# Patient Record
Sex: MALE | Race: WHITE | NOT HISPANIC OR LATINO | Employment: UNEMPLOYED | URBAN - NONMETROPOLITAN AREA
[De-identification: names, ages, dates, MRNs, and addresses within clinical notes are randomized per-mention and may not be internally consistent; named-entity substitution may affect disease eponyms.]

---

## 2019-03-14 ENCOUNTER — HOSPITAL ENCOUNTER (EMERGENCY)
Facility: HOSPITAL | Age: 59
Discharge: HOME | End: 2019-03-14
Attending: EMERGENCY MEDICINE | Admitting: EMERGENCY MEDICINE

## 2019-03-14 VITALS
WEIGHT: 216 LBS | RESPIRATION RATE: 20 BRPM | HEIGHT: 67 IN | DIASTOLIC BLOOD PRESSURE: 92 MMHG | HEART RATE: 102 BPM | BODY MASS INDEX: 33.9 KG/M2 | SYSTOLIC BLOOD PRESSURE: 142 MMHG | OXYGEN SATURATION: 96 % | TEMPERATURE: 98.8 F

## 2019-03-14 DIAGNOSIS — F41.9 ANXIETY: Primary | ICD-10-CM

## 2019-03-14 PROCEDURE — 99283 EMERGENCY DEPT VISIT LOW MDM: CPT

## 2019-03-14 RX ORDER — LORAZEPAM 1 MG/1
1 TABLET ORAL ONCE
Status: COMPLETED | OUTPATIENT
Start: 2019-03-14 | End: 2019-03-14

## 2019-03-14 RX ADMIN — LORAZEPAM 1 MG: 1 TABLET ORAL at 14:05

## 2019-03-14 NOTE — ED NOTES
contacted and asked to apeak with patient about other alternatives for living arrangements. ([place])     Kalpana Adams, RN  03/14/19 [id number]

## 2019-03-14 NOTE — ED PROVIDER NOTES
Subjective   Pt, hx of chronic depression and anxiety, reports he was kicked out of his brother's house 2 days ago and has been at the homeless shelter since then. Pt reports homeless shelter advised pt to come to ED for anxiety. Pt denies being suicidal or homicidal, currently not on any psych medications.         History provided by:  Patient   used: No    Anxiety   Symptoms include nervous/anxious behavior. Patient reports no shortness of breath or suicidal ideas.           Review of Systems   Constitutional: Negative for fatigue.   HENT: Negative for congestion.    Respiratory: Negative for cough and shortness of breath.    Gastrointestinal: Negative for vomiting.   Endocrine: Negative for polyuria.   Skin: Negative for color change.   Neurological: Negative for syncope.   Psychiatric/Behavioral: Negative for self-injury and suicidal ideas. The patient is nervous/anxious. The patient is not hyperactive.    All other systems reviewed and are negative.      History reviewed. No pertinent past medical history.    No Known Allergies    History reviewed. No pertinent surgical history.    History reviewed. No pertinent family history.    Social History     Socioeconomic History   • Marital status: Single     Spouse name: Not on file   • Number of children: Not on file   • Years of education: Not on file   • Highest education level: Not on file   Tobacco Use   • Smoking status: Current Every Day Smoker     Packs/day: 0.50     Types: Cigarettes   Substance and Sexual Activity   • Alcohol use: No     Frequency: Never   • Drug use: No           Objective   Physical Exam   Constitutional: He is oriented to person, place, and time. He appears well-developed and well-nourished.   HENT:   Head: Normocephalic.   Right Ear: Hearing normal.   Left Ear: Hearing normal.   Nose: Nose normal.   Eyes: Conjunctivae, EOM and lids are normal.   Neck: Trachea normal and full passive range of motion without pain.    Cardiovascular: Regular rhythm, S1 normal, S2 normal, normal heart sounds and normal pulses.   Pulmonary/Chest: Effort normal and breath sounds normal.   Abdominal: Normal appearance and bowel sounds are normal.   Neurological: He is alert and oriented to person, place, and time. He is not disoriented.   Skin: Skin is warm and dry. He is not diaphoretic.   Psychiatric: His speech is normal and behavior is normal. Thought content normal. His mood appears anxious. He does not express impulsivity. He expresses no suicidal ideation.   Nursing note and vitals reviewed.      Procedures         Labs Reviewed - No data to display    No orders to display           ED Course      2:30P  Lengthy discussion with pt about results. Pt expresses that he does not where to go tomorrow during the day because homeless shelter forces people to leave during the day. Pt expresses LLE arthritis but refuses x-ray or additional imaging for leg. JAKE Acuna called  and will provide resources for patient. Will provide information on [facility] follow-up. Pt is still denying suicidal or homcidal            MDM      Final diagnoses:   Anxiety            Rosario Olivera PA-C  03/15/19 [id number]

## 2019-03-14 NOTE — ED NOTES
"Patient states he is \"feeling depressed\" about having to live in a shelter after being \"kicked out\" of his brother's home.  Patient denies suicidal or homicidal thoughts.     Kalpana Adams RN  03/14/19 [id number]    "

## 2019-12-26 ENCOUNTER — APPOINTMENT (OUTPATIENT)
Dept: CT IMAGING | Facility: HOSPITAL | Age: 59
End: 2019-12-26

## 2019-12-26 ENCOUNTER — HOSPITAL ENCOUNTER (EMERGENCY)
Facility: HOSPITAL | Age: 59
Discharge: HOME | End: 2019-12-26
Attending: FAMILY MEDICINE | Admitting: FAMILY MEDICINE

## 2019-12-26 VITALS
HEART RATE: 76 BPM | DIASTOLIC BLOOD PRESSURE: 81 MMHG | WEIGHT: 189.5 LBS | TEMPERATURE: 98.3 F | OXYGEN SATURATION: 99 % | RESPIRATION RATE: 18 BRPM | SYSTOLIC BLOOD PRESSURE: 128 MMHG | HEIGHT: 67 IN | BODY MASS INDEX: 29.74 KG/M2

## 2019-12-26 DIAGNOSIS — R10.31 RIGHT INGUINAL PAIN: Primary | ICD-10-CM

## 2019-12-26 LAB
ANION GAP SERPL CALCULATED.3IONS-SCNC: 9 MMOL/L (ref 5–15)
BUN BLD-MCNC: 18 MG/DL (ref 6–20)
BUN/CREAT SERPL: 20.9 (ref 7–25)
CALCIUM SPEC-SCNC: 9 MG/DL (ref 8.6–10.5)
CHLORIDE SERPL-SCNC: 106 MMOL/L (ref 98–107)
CO2 SERPL-SCNC: 26 MMOL/L (ref 22–29)
CREAT BLD-MCNC: 0.86 MG/DL (ref 0.76–1.27)
GFR SERPL CREATININE-BSD FRML MDRD: 91 ML/MIN/1.73
GLUCOSE BLD-MCNC: 99 MG/DL (ref 65–99)
HOLD SPECIMEN: NORMAL
POTASSIUM BLD-SCNC: 4.2 MMOL/L (ref 3.5–5.2)
SODIUM BLD-SCNC: 141 MMOL/L (ref 136–145)
WHOLE BLOOD HOLD SPECIMEN: NORMAL

## 2019-12-26 PROCEDURE — 90471 IMMUNIZATION ADMIN: CPT | Performed by: FAMILY MEDICINE

## 2019-12-26 PROCEDURE — 25010000002 TDAP 5-2.5-18.5 LF-MCG/0.5 SUSPENSION: Performed by: FAMILY MEDICINE

## 2019-12-26 PROCEDURE — 80048 BASIC METABOLIC PNL TOTAL CA: CPT | Performed by: FAMILY MEDICINE

## 2019-12-26 PROCEDURE — 25010000002 IOPAMIDOL 61 % SOLUTION: Performed by: FAMILY MEDICINE

## 2019-12-26 PROCEDURE — 99284 EMERGENCY DEPT VISIT MOD MDM: CPT

## 2019-12-26 PROCEDURE — 72193 CT PELVIS W/DYE: CPT

## 2019-12-26 PROCEDURE — 90715 TDAP VACCINE 7 YRS/> IM: CPT | Performed by: FAMILY MEDICINE

## 2019-12-26 RX ORDER — MELOXICAM 15 MG/1
15 TABLET ORAL DAILY
Qty: 30 TABLET | Refills: 0 | Status: SHIPPED | OUTPATIENT
Start: 2019-12-26 | End: 2019-12-30 | Stop reason: SDUPTHER

## 2019-12-26 RX ORDER — ONDANSETRON 4 MG/1
4 TABLET, ORALLY DISINTEGRATING ORAL EVERY 6 HOURS PRN
Qty: 10 TABLET | Refills: 0 | Status: SHIPPED | OUTPATIENT
Start: 2019-12-26 | End: 2020-09-29

## 2019-12-26 RX ORDER — IBUPROFEN 600 MG/1
600 TABLET ORAL EVERY 6 HOURS PRN
COMMUNITY
End: 2019-12-26

## 2019-12-26 RX ORDER — HYDROCODONE BITARTRATE AND ACETAMINOPHEN 7.5; 325 MG/1; MG/1
1 TABLET ORAL ONCE
Status: COMPLETED | OUTPATIENT
Start: 2019-12-26 | End: 2019-12-26

## 2019-12-26 RX ADMIN — HYDROCODONE BITARTRATE AND ACETAMINOPHEN 1 TABLET: 7.5; 325 TABLET ORAL at 16:18

## 2019-12-26 RX ADMIN — TETANUS TOXOID, REDUCED DIPHTHERIA TOXOID AND ACELLULAR PERTUSSIS VACCINE, ADSORBED 0.5 ML: 5; 2.5; 8; 8; 2.5 SUSPENSION INTRAMUSCULAR at 18:48

## 2019-12-26 RX ADMIN — IOPAMIDOL 90 ML: 612 INJECTION, SOLUTION INTRAVENOUS at 18:04

## 2019-12-30 ENCOUNTER — OFFICE VISIT (OUTPATIENT)
Dept: FAMILY MEDICINE CLINIC | Facility: CLINIC | Age: 59
End: 2019-12-30

## 2019-12-30 VITALS
HEIGHT: 67 IN | BODY MASS INDEX: 29.34 KG/M2 | SYSTOLIC BLOOD PRESSURE: 142 MMHG | TEMPERATURE: 97.6 F | DIASTOLIC BLOOD PRESSURE: 98 MMHG | OXYGEN SATURATION: 98 % | HEART RATE: 92 BPM | WEIGHT: 186.9 LBS

## 2019-12-30 DIAGNOSIS — Z09 HOSPITAL DISCHARGE FOLLOW-UP: Primary | ICD-10-CM

## 2019-12-30 DIAGNOSIS — K40.90 INGUINAL HERNIA OF RIGHT SIDE WITHOUT OBSTRUCTION OR GANGRENE: ICD-10-CM

## 2019-12-30 PROCEDURE — 99203 OFFICE O/P NEW LOW 30 MIN: CPT | Performed by: STUDENT IN AN ORGANIZED HEALTH CARE EDUCATION/TRAINING PROGRAM

## 2019-12-30 RX ORDER — IBUPROFEN 200 MG
200 TABLET ORAL DAILY
COMMUNITY
End: 2019-12-30 | Stop reason: ALTCHOICE

## 2019-12-30 RX ORDER — MELOXICAM 15 MG/1
15 TABLET ORAL DAILY
Qty: 30 TABLET | Refills: 1 | Status: SHIPPED | OUTPATIENT
Start: 2019-12-30 | End: 2020-01-27 | Stop reason: SDUPTHER

## 2019-12-30 NOTE — PROGRESS NOTES
Subjective:     Germain Masterson is a 59 y.o. male who presents for ***        Past Medical Hx:  No past medical history on file.    Past Surgical Hx:  No past surgical history on file.    Health Maintenance:  Health Maintenance   Topic Date Due   • ANNUAL PHYSICAL  11/28/1963   • PNEUMOCOCCAL VACCINE (19-64 MEDIUM RISK) (1 of 1 - PPSV23) 11/28/1979   • ZOSTER VACCINE (1 of 2) 11/28/2010   • INFLUENZA VACCINE  08/01/2019   • HEPATITIS C SCREENING  12/30/2019   • COLONOSCOPY  12/30/2019   • TDAP/TD VACCINES (2 - Td) 12/26/2029       Current Meds:    Current Outpatient Medications:   •  meloxicam (MOBIC) 15 MG tablet, Take 1 tablet by mouth Daily., Disp: 30 tablet, Rfl: 0  •  ondansetron ODT (ZOFRAN-ODT) 4 MG disintegrating tablet, Take 1 tablet by mouth Every 6 (Six) Hours As Needed for Nausea or Vomiting., Disp: 10 tablet, Rfl: 0    Allergies:  Patient has no known allergies.    Family Hx:  No family history on file.     Social History:  Social History     Socioeconomic History   • Marital status: Single     Spouse name: Not on file   • Number of children: Not on file   • Years of education: Not on file   • Highest education level: Not on file   Tobacco Use   • Smoking status: Current Every Day Smoker     Packs/day: 0.50     Types: Cigars   Substance and Sexual Activity   • Alcohol use: No     Frequency: Never   • Drug use: No       Review of Systems    Review of Systems    Objective:     There were no vitals taken for this visit.    Physical Exam    Assessment/Plan:     No diagnosis found.      There are no diagnoses linked to this encounter.    Follow-up:     No follow-ups on file.      Goals    None         Preventative:  Does not smoke. ***  Minimal alcohol consumption. ***  {Preventive male/female:73580}  On osteoporosis therapy?{yes/no/not indicated:58291}    Patient's There is no height or weight on file to calculate BMI. BMI is {BMI range:43241}.      PHQ-9 Depression Screening  Little interest or  pleasure in doing things?     Feeling down, depressed, or hopeless?     Trouble falling or staying asleep, or sleeping too much?     Feeling tired or having little energy?     Poor appetite or overeating?     Feeling bad about yourself - or that you are a failure or have let yourself or your family down?     Trouble concentrating on things, such as reading the newspaper or watching television?     Moving or speaking so slowly that other people could have noticed? Or the opposite - being so fidgety or restless that you have been moving around a lot more than usual?     Thoughts that you would be better off dead, or of hurting yourself in some way?     PHQ-9 Total Score     If you checked off any problems, how difficult have these problems made it for you to do your work, take care of things at home, or get along with other people?           Tetanus vaccine: {Immunization status:33414}  Annual influenza vaccine: {Immunization status:25500}   Pneumococcal vaccine: {Immunization status:45773}  HPV vaccine: {Immunization status:97729}  Hep B vaccine: {Immunization status:24846}   Shingrix vaccine: {Immunization status:98498}      RISK SCORE: {Resident-RiskScore:83961}      Lindy Mora M.D. PGY3  [facility]  [address] [place] [address]  Office: [phone]      This document has been electronically signed by Lindy Mora MD on December 30, 2019 3:01 PM

## 2020-01-09 ENCOUNTER — CONSULT (OUTPATIENT)
Dept: SURGERY | Facility: CLINIC | Age: 60
End: 2020-01-09

## 2020-01-09 VITALS
HEIGHT: 67 IN | HEART RATE: 100 BPM | BODY MASS INDEX: 29.07 KG/M2 | DIASTOLIC BLOOD PRESSURE: 82 MMHG | WEIGHT: 185.2 LBS | TEMPERATURE: 97.1 F | SYSTOLIC BLOOD PRESSURE: 144 MMHG

## 2020-01-09 DIAGNOSIS — R10.31 RIGHT GROIN PAIN: Primary | ICD-10-CM

## 2020-01-09 PROCEDURE — 99202 OFFICE O/P NEW SF 15 MIN: CPT | Performed by: SURGERY

## 2020-01-27 DIAGNOSIS — K40.90 INGUINAL HERNIA OF RIGHT SIDE WITHOUT OBSTRUCTION OR GANGRENE: ICD-10-CM

## 2020-01-27 NOTE — PROGRESS NOTES
CHIEF COMPLAINT:    Possible right inguinal hernia    HISTORY OF PRESENT ILLNESS:    Germain Masetrson is a 59 y.o. male who was previously seen by the emergency department in December for right inguinal pain.  At that time he underwent a CT that showed a possible fat-containing right inguinal hernia.  He states that after he has been on his feet moving around for significant period of time he sometimes has right groin pain and feels as though there may be a bulge in the area.  Recently he has not had any pain and currently has no pain in the office.    Past Medical History:   Diagnosis Date   • Hypertension        Past Surgical History:   Procedure Laterality Date   • CYSTECTOMY      back       Prior to Admission medications    Medication Sig Start Date End Date Taking? Authorizing Provider   Acetaminophen (TYLENOL ARTHRITIS PAIN PO) Take 1 tablet by mouth Daily.   Yes Provider, MD Rosmearie   meloxicam (MOBIC) 15 MG tablet Take 1 tablet by mouth Daily. 12/30/19  Yes Lindy Mora MD   ondansetron ODT (ZOFRAN-ODT) 4 MG disintegrating tablet Take 1 tablet by mouth Every 6 (Six) Hours As Needed for Nausea or Vomiting. 12/26/19   Wai Brown MD       No Known Allergies    Family History   Problem Relation Age of Onset   • Hypertension Mother    • Cancer Mother    • Cancer Father        Social History     Socioeconomic History   • Marital status: Single     Spouse name: Not on file   • Number of children: Not on file   • Years of education: Not on file   • Highest education level: Not on file   Tobacco Use   • Smoking status: Current Every Day Smoker     Packs/day: 0.50     Types: Cigars   • Smokeless tobacco: Never Used   Substance and Sexual Activity   • Alcohol use: No     Frequency: Never   • Drug use: No   • Sexual activity: Yes     Partners: Female     Birth control/protection: None       Review of Systems   Constitutional: Negative for activity change, appetite change, chills and  "fever.   HENT: Negative for hearing loss, nosebleeds and trouble swallowing.    Cardiovascular: Negative for chest pain, palpitations and leg swelling.   Gastrointestinal: Negative for abdominal distention, abdominal pain, anal bleeding, blood in stool, constipation, diarrhea, nausea, rectal pain and vomiting.   Endocrine: Negative for cold intolerance, heat intolerance, polydipsia and polyuria.   Genitourinary: Negative for decreased urine volume, difficulty urinating, dysuria, enuresis, frequency, hematuria and urgency.        Right groin pain, resolved.   Musculoskeletal: Positive for arthralgias. Negative for back pain, gait problem, myalgias and neck pain.   Skin: Negative for pallor, rash and wound.   Allergic/Immunologic: Negative for immunocompromised state.   Neurological: Negative for dizziness, seizures, weakness, light-headedness, numbness and headaches.   Psychiatric/Behavioral: Negative for agitation and behavioral problems. The patient is not nervous/anxious.        Objective     /82   Pulse 100   Temp 97.1 °F (36.2 °C) (Temporal)   Ht 170.2 cm (67\")   Wt 84 kg (185 lb 3.2 oz)   BMI 29.01 kg/m²     Physical Exam   Constitutional: He is oriented to person, place, and time. He appears well-developed and well-nourished.   HENT:   Head: Normocephalic and atraumatic.   Nose: Nose normal.   Eyes: Conjunctivae and EOM are normal. Right eye exhibits no discharge. Left eye exhibits no discharge.   Neck: Trachea normal, normal range of motion and phonation normal. Neck supple. No JVD present. No tracheal deviation and no edema present. No thyromegaly present.   Cardiovascular: Normal rate, regular rhythm and normal heart sounds. Exam reveals no gallop and no friction rub.   No murmur heard.  Pulmonary/Chest: Effort normal and breath sounds normal. No accessory muscle usage. No respiratory distress. He has no decreased breath sounds. He has no wheezes. He has no rales. He exhibits no tenderness. "   Abdominal: Soft. He exhibits no distension, no fluid wave, no ascites, no pulsatile midline mass and no mass. There is no tenderness. There is no rebound and no guarding. No hernia.   Musculoskeletal: Normal range of motion. He exhibits no edema, tenderness or deformity.   Lymphadenopathy:     He has no cervical adenopathy.        Left: No supraclavicular adenopathy present.   Neurological: He is alert and oriented to person, place, and time. He has normal strength. No cranial nerve deficit.   Skin: Skin is warm and dry. No rash noted. He is not diaphoretic. No erythema. No pallor.   Psychiatric: He has a normal mood and affect. His speech is normal and behavior is normal. Judgment and thought content normal. Cognition and memory are normal.   Vitals reviewed.      DIAGNOSTIC DATA:    CT reviewed showing possible fat-containing inguinal hernia    ASSESSMENT:    Right groin pain, no demonstrable hernia on exam    PLAN:    Currently I cannot demonstrate any hernia in the office today.  The patient also is asymptomatic currently.  If he has return of symptoms or a notable bulge in the groin I would be happy to see him back for further assessment.          This document has been electronically signed by Durga Hunter MD on January 27, 2020 4:46 PM

## 2020-01-27 NOTE — TELEPHONE ENCOUNTER
PATIENT'S EMERGENCY CONTACT CALLED; PATIENT IS NEEDING MED REFILL ON HIS meloxicam (MOBIC) 15 MG tablet AND WOULD LIKE FOR THIS TO BE SENT TO [facility]. HER NUMBER TO CALL BACK [phone]. HE IS REALLY NEEDING THIS BECAUSE HE IS OUT.    THANK YOU,      NANCY

## 2020-01-28 RX ORDER — MELOXICAM 15 MG/1
15 TABLET ORAL DAILY
Qty: 30 TABLET | Refills: 0 | Status: SHIPPED | OUTPATIENT
Start: 2020-01-28 | End: 2020-04-14 | Stop reason: SDUPTHER

## 2020-04-14 DIAGNOSIS — K40.90 INGUINAL HERNIA OF RIGHT SIDE WITHOUT OBSTRUCTION OR GANGRENE: ICD-10-CM

## 2020-04-14 RX ORDER — MELOXICAM 15 MG/1
15 TABLET ORAL DAILY
Qty: 30 TABLET | Refills: 0 | Status: SHIPPED | OUTPATIENT
Start: 2020-04-14 | End: 2020-05-21 | Stop reason: SDUPTHER

## 2020-05-21 DIAGNOSIS — K40.90 INGUINAL HERNIA OF RIGHT SIDE WITHOUT OBSTRUCTION OR GANGRENE: ICD-10-CM

## 2020-05-22 RX ORDER — MELOXICAM 15 MG/1
15 TABLET ORAL DAILY
Qty: 30 TABLET | Refills: 0 | Status: SHIPPED | OUTPATIENT
Start: 2020-05-22 | End: 2020-06-19 | Stop reason: SDUPTHER

## 2020-06-19 DIAGNOSIS — K40.90 INGUINAL HERNIA OF RIGHT SIDE WITHOUT OBSTRUCTION OR GANGRENE: ICD-10-CM

## 2020-06-19 RX ORDER — MELOXICAM 15 MG/1
15 TABLET ORAL DAILY
Qty: 30 TABLET | Refills: 0 | Status: SHIPPED | OUTPATIENT
Start: 2020-06-19 | End: 2020-07-21

## 2020-07-19 DIAGNOSIS — K40.90 INGUINAL HERNIA OF RIGHT SIDE WITHOUT OBSTRUCTION OR GANGRENE: ICD-10-CM

## 2020-07-21 RX ORDER — MELOXICAM 15 MG/1
15 TABLET ORAL DAILY
Qty: 30 TABLET | Refills: 0 | Status: SHIPPED | OUTPATIENT
Start: 2020-07-21 | End: 2020-10-02 | Stop reason: HOSPADM

## 2020-09-14 DIAGNOSIS — K40.90 INGUINAL HERNIA OF RIGHT SIDE WITHOUT OBSTRUCTION OR GANGRENE: ICD-10-CM

## 2020-09-17 RX ORDER — MELOXICAM 15 MG/1
15 TABLET ORAL DAILY
Qty: 30 TABLET | Refills: 0 | OUTPATIENT
Start: 2020-09-17

## 2020-09-17 NOTE — TELEPHONE ENCOUNTER
Please have patient come in for evaluation.  If he is still experiencing pain after this long, he needs to be examined.

## 2020-09-29 ENCOUNTER — APPOINTMENT (OUTPATIENT)
Dept: CT IMAGING | Facility: HOSPITAL | Age: 60
End: 2020-09-29

## 2020-09-29 ENCOUNTER — APPOINTMENT (OUTPATIENT)
Dept: GENERAL RADIOLOGY | Facility: HOSPITAL | Age: 60
End: 2020-09-29

## 2020-09-29 ENCOUNTER — HOSPITAL ENCOUNTER (OUTPATIENT)
Facility: HOSPITAL | Age: 60
Discharge: HOME | End: 2020-10-02
Attending: EMERGENCY MEDICINE | Admitting: FAMILY MEDICINE

## 2020-09-29 DIAGNOSIS — R10.10 UPPER ABDOMINAL PAIN: Primary | ICD-10-CM

## 2020-09-29 DIAGNOSIS — R93.5 ABNORMAL ABDOMINAL CT SCAN: ICD-10-CM

## 2020-09-29 LAB
ALBUMIN SERPL-MCNC: 3.8 G/DL (ref 3.5–5.2)
ALBUMIN/GLOB SERPL: 1.2 G/DL
ALP SERPL-CCNC: 84 U/L (ref 39–117)
ALT SERPL W P-5'-P-CCNC: 12 U/L (ref 1–41)
AMPHET+METHAMPHET UR QL: NEGATIVE
AMPHETAMINES UR QL: NEGATIVE
ANION GAP SERPL CALCULATED.3IONS-SCNC: 7 MMOL/L (ref 5–15)
AST SERPL-CCNC: 11 U/L (ref 1–40)
BARBITURATES UR QL SCN: NEGATIVE
BASOPHILS # BLD AUTO: 0.04 10*3/MM3 (ref 0–0.2)
BASOPHILS NFR BLD AUTO: 0.4 % (ref 0–1.5)
BENZODIAZ UR QL SCN: NEGATIVE
BILIRUB SERPL-MCNC: <0.2 MG/DL (ref 0–1.2)
BILIRUB UR QL STRIP: NEGATIVE
BUN SERPL-MCNC: 21 MG/DL (ref 6–20)
BUN/CREAT SERPL: 24.7 (ref 7–25)
BUPRENORPHINE SERPL-MCNC: NEGATIVE NG/ML
CALCIUM SPEC-SCNC: 9.8 MG/DL (ref 8.6–10.5)
CANNABINOIDS SERPL QL: NEGATIVE
CHLORIDE SERPL-SCNC: 102 MMOL/L (ref 98–107)
CLARITY UR: CLEAR
CO2 SERPL-SCNC: 28 MMOL/L (ref 22–29)
COCAINE UR QL: NEGATIVE
COLOR UR: YELLOW
CREAT SERPL-MCNC: 0.85 MG/DL (ref 0.76–1.27)
DEPRECATED RDW RBC AUTO: 47 FL (ref 37–54)
EOSINOPHIL # BLD AUTO: 0.28 10*3/MM3 (ref 0–0.4)
EOSINOPHIL NFR BLD AUTO: 2.5 % (ref 0.3–6.2)
ERYTHROCYTE [DISTWIDTH] IN BLOOD BY AUTOMATED COUNT: 13.5 % (ref 12.3–15.4)
GFR SERPL CREATININE-BSD FRML MDRD: 92 ML/MIN/1.73
GLOBULIN UR ELPH-MCNC: 3.3 GM/DL
GLUCOSE SERPL-MCNC: 130 MG/DL (ref 65–99)
GLUCOSE UR STRIP-MCNC: NEGATIVE MG/DL
HCT VFR BLD AUTO: 36.2 % (ref 37.5–51)
HGB BLD-MCNC: 11.6 G/DL (ref 13–17.7)
HGB UR QL STRIP.AUTO: NEGATIVE
HOLD SPECIMEN: NORMAL
HOLD SPECIMEN: NORMAL
IMM GRANULOCYTES # BLD AUTO: 0.05 10*3/MM3 (ref 0–0.05)
IMM GRANULOCYTES NFR BLD AUTO: 0.4 % (ref 0–0.5)
KETONES UR QL STRIP: NEGATIVE
LEUKOCYTE ESTERASE UR QL STRIP.AUTO: NEGATIVE
LIPASE SERPL-CCNC: 25 U/L (ref 13–60)
LYMPHOCYTES # BLD AUTO: 1.85 10*3/MM3 (ref 0.7–3.1)
LYMPHOCYTES NFR BLD AUTO: 16.3 % (ref 19.6–45.3)
MCH RBC QN AUTO: 30.3 PG (ref 26.6–33)
MCHC RBC AUTO-ENTMCNC: 32 G/DL (ref 31.5–35.7)
MCV RBC AUTO: 94.5 FL (ref 79–97)
METHADONE UR QL SCN: NEGATIVE
MONOCYTES # BLD AUTO: 0.79 10*3/MM3 (ref 0.1–0.9)
MONOCYTES NFR BLD AUTO: 7 % (ref 5–12)
NEUTROPHILS NFR BLD AUTO: 73.4 % (ref 42.7–76)
NEUTROPHILS NFR BLD AUTO: 8.35 10*3/MM3 (ref 1.7–7)
NITRITE UR QL STRIP: NEGATIVE
NRBC BLD AUTO-RTO: 0 /100 WBC (ref 0–0.2)
OPIATES UR QL: NEGATIVE
OXYCODONE UR QL SCN: NEGATIVE
PCP UR QL SCN: NEGATIVE
PH UR STRIP.AUTO: 6 [PH] (ref 5–9)
PLATELET # BLD AUTO: 408 10*3/MM3 (ref 140–450)
PMV BLD AUTO: 9.9 FL (ref 6–12)
POTASSIUM SERPL-SCNC: 4.4 MMOL/L (ref 3.5–5.2)
PROPOXYPH UR QL: NEGATIVE
PROT SERPL-MCNC: 7.1 G/DL (ref 6–8.5)
PROT UR QL STRIP: NEGATIVE
RBC # BLD AUTO: 3.83 10*6/MM3 (ref 4.14–5.8)
SODIUM SERPL-SCNC: 137 MMOL/L (ref 136–145)
SP GR UR STRIP: 1.05 (ref 1–1.03)
TRICYCLICS UR QL SCN: NEGATIVE
UROBILINOGEN UR QL STRIP: ABNORMAL
WBC # BLD AUTO: 11.36 10*3/MM3 (ref 3.4–10.8)
WHOLE BLOOD HOLD SPECIMEN: NORMAL
WHOLE BLOOD HOLD SPECIMEN: NORMAL

## 2020-09-29 PROCEDURE — 25010000002 IOPAMIDOL 61 % SOLUTION: Performed by: EMERGENCY MEDICINE

## 2020-09-29 PROCEDURE — 93010 ELECTROCARDIOGRAM REPORT: CPT | Performed by: INTERNAL MEDICINE

## 2020-09-29 PROCEDURE — 25010000002 ONDANSETRON PER 1 MG: Performed by: NURSE PRACTITIONER

## 2020-09-29 PROCEDURE — G0378 HOSPITAL OBSERVATION PER HR: HCPCS

## 2020-09-29 PROCEDURE — 99244 OFF/OP CNSLTJ NEW/EST MOD 40: CPT | Performed by: INTERNAL MEDICINE

## 2020-09-29 PROCEDURE — 96366 THER/PROPH/DIAG IV INF ADDON: CPT

## 2020-09-29 PROCEDURE — 96365 THER/PROPH/DIAG IV INF INIT: CPT

## 2020-09-29 PROCEDURE — 99285 EMERGENCY DEPT VISIT HI MDM: CPT

## 2020-09-29 PROCEDURE — 96376 TX/PRO/DX INJ SAME DRUG ADON: CPT

## 2020-09-29 PROCEDURE — 96367 TX/PROPH/DG ADDL SEQ IV INF: CPT

## 2020-09-29 PROCEDURE — 80053 COMPREHEN METABOLIC PANEL: CPT | Performed by: NURSE PRACTITIONER

## 2020-09-29 PROCEDURE — 25010000002 PIPERACILLIN SOD-TAZOBACTAM PER 1 G: Performed by: NURSE PRACTITIONER

## 2020-09-29 PROCEDURE — 93005 ELECTROCARDIOGRAM TRACING: CPT | Performed by: NURSE PRACTITIONER

## 2020-09-29 PROCEDURE — 81003 URINALYSIS AUTO W/O SCOPE: CPT | Performed by: NURSE PRACTITIONER

## 2020-09-29 PROCEDURE — 74177 CT ABD & PELVIS W/CONTRAST: CPT

## 2020-09-29 PROCEDURE — 71045 X-RAY EXAM CHEST 1 VIEW: CPT

## 2020-09-29 PROCEDURE — 80306 DRUG TEST PRSMV INSTRMNT: CPT | Performed by: NURSE PRACTITIONER

## 2020-09-29 PROCEDURE — 83690 ASSAY OF LIPASE: CPT | Performed by: NURSE PRACTITIONER

## 2020-09-29 PROCEDURE — 85025 COMPLETE CBC W/AUTO DIFF WBC: CPT | Performed by: NURSE PRACTITIONER

## 2020-09-29 PROCEDURE — 96375 TX/PRO/DX INJ NEW DRUG ADDON: CPT

## 2020-09-29 RX ORDER — PANTOPRAZOLE SODIUM 40 MG/10ML
40 INJECTION, POWDER, LYOPHILIZED, FOR SOLUTION INTRAVENOUS
Status: DISCONTINUED | OUTPATIENT
Start: 2020-09-29 | End: 2020-09-29

## 2020-09-29 RX ORDER — ONDANSETRON 2 MG/ML
4 INJECTION INTRAMUSCULAR; INTRAVENOUS ONCE
Status: COMPLETED | OUTPATIENT
Start: 2020-09-29 | End: 2020-09-29

## 2020-09-29 RX ORDER — NALOXONE HCL 0.4 MG/ML
0.4 VIAL (ML) INJECTION
Status: DISCONTINUED | OUTPATIENT
Start: 2020-09-29 | End: 2020-10-02 | Stop reason: HOSPADM

## 2020-09-29 RX ORDER — ONDANSETRON 4 MG/1
4 TABLET, FILM COATED ORAL EVERY 6 HOURS PRN
Status: DISCONTINUED | OUTPATIENT
Start: 2020-09-29 | End: 2020-10-02 | Stop reason: HOSPADM

## 2020-09-29 RX ORDER — SODIUM CHLORIDE 0.9 % (FLUSH) 0.9 %
10 SYRINGE (ML) INJECTION AS NEEDED
Status: DISCONTINUED | OUTPATIENT
Start: 2020-09-29 | End: 2020-10-02 | Stop reason: HOSPADM

## 2020-09-29 RX ORDER — SODIUM CHLORIDE 9 MG/ML
100 INJECTION, SOLUTION INTRAVENOUS CONTINUOUS
Status: DISCONTINUED | OUTPATIENT
Start: 2020-09-29 | End: 2020-10-02 | Stop reason: HOSPADM

## 2020-09-29 RX ORDER — SODIUM CHLORIDE 0.9 % (FLUSH) 0.9 %
10 SYRINGE (ML) INJECTION EVERY 12 HOURS SCHEDULED
Status: DISCONTINUED | OUTPATIENT
Start: 2020-09-29 | End: 2020-10-02 | Stop reason: HOSPADM

## 2020-09-29 RX ORDER — ONDANSETRON 2 MG/ML
4 INJECTION INTRAMUSCULAR; INTRAVENOUS EVERY 6 HOURS PRN
Status: DISCONTINUED | OUTPATIENT
Start: 2020-09-29 | End: 2020-10-02 | Stop reason: HOSPADM

## 2020-09-29 RX ORDER — PANTOPRAZOLE SODIUM 40 MG/10ML
40 INJECTION, POWDER, LYOPHILIZED, FOR SOLUTION INTRAVENOUS
Status: CANCELLED | OUTPATIENT
Start: 2020-09-30

## 2020-09-29 RX ORDER — MORPHINE SULFATE 2 MG/ML
1 INJECTION, SOLUTION INTRAMUSCULAR; INTRAVENOUS
Status: DISCONTINUED | OUTPATIENT
Start: 2020-09-29 | End: 2020-10-02 | Stop reason: HOSPADM

## 2020-09-29 RX ADMIN — SODIUM CHLORIDE 8 MG/HR: 900 INJECTION INTRAVENOUS at 16:42

## 2020-09-29 RX ADMIN — PANTOPRAZOLE SODIUM 40 MG: 40 INJECTION, POWDER, FOR SOLUTION INTRAVENOUS at 11:59

## 2020-09-29 RX ADMIN — SODIUM CHLORIDE 100 ML/HR: 9 INJECTION, SOLUTION INTRAVENOUS at 16:42

## 2020-09-29 RX ADMIN — SODIUM CHLORIDE 8 MG/HR: 900 INJECTION INTRAVENOUS at 22:02

## 2020-09-29 RX ADMIN — ONDANSETRON HYDROCHLORIDE 4 MG: 2 INJECTION, SOLUTION INTRAMUSCULAR; INTRAVENOUS at 11:58

## 2020-09-29 RX ADMIN — SODIUM CHLORIDE, PRESERVATIVE FREE 10 ML: 5 INJECTION INTRAVENOUS at 20:24

## 2020-09-29 RX ADMIN — IOPAMIDOL 90 ML: 612 INJECTION, SOLUTION INTRAVENOUS at 11:12

## 2020-09-29 RX ADMIN — SODIUM CHLORIDE 1000 ML: 900 INJECTION, SOLUTION INTRAVENOUS at 11:58

## 2020-09-30 LAB
ANION GAP SERPL CALCULATED.3IONS-SCNC: 6 MMOL/L (ref 5–15)
BASOPHILS # BLD AUTO: 0.04 10*3/MM3 (ref 0–0.2)
BASOPHILS NFR BLD AUTO: 0.4 % (ref 0–1.5)
BUN SERPL-MCNC: 19 MG/DL (ref 6–20)
BUN/CREAT SERPL: 22.1 (ref 7–25)
CALCIUM SPEC-SCNC: 8.6 MG/DL (ref 8.6–10.5)
CHLORIDE SERPL-SCNC: 107 MMOL/L (ref 98–107)
CO2 SERPL-SCNC: 25 MMOL/L (ref 22–29)
CREAT SERPL-MCNC: 0.86 MG/DL (ref 0.76–1.27)
DEPRECATED RDW RBC AUTO: 46.8 FL (ref 37–54)
EOSINOPHIL # BLD AUTO: 0.24 10*3/MM3 (ref 0–0.4)
EOSINOPHIL NFR BLD AUTO: 2.3 % (ref 0.3–6.2)
ERYTHROCYTE [DISTWIDTH] IN BLOOD BY AUTOMATED COUNT: 13.7 % (ref 12.3–15.4)
FERRITIN SERPL-MCNC: 77.03 NG/ML (ref 30–400)
GFR SERPL CREATININE-BSD FRML MDRD: 91 ML/MIN/1.73
GLUCOSE SERPL-MCNC: 97 MG/DL (ref 65–99)
HCT VFR BLD AUTO: 29.1 % (ref 37.5–51)
HCT VFR BLD AUTO: 30.2 % (ref 37.5–51)
HGB BLD-MCNC: 9.7 G/DL (ref 13–17.7)
HGB BLD-MCNC: 9.7 G/DL (ref 13–17.7)
IMM GRANULOCYTES # BLD AUTO: 0.07 10*3/MM3 (ref 0–0.05)
IMM GRANULOCYTES NFR BLD AUTO: 0.7 % (ref 0–0.5)
IRON 24H UR-MRATE: 16 MCG/DL (ref 59–158)
IRON SATN MFR SERPL: 6 % (ref 20–50)
LYMPHOCYTES # BLD AUTO: 1.57 10*3/MM3 (ref 0.7–3.1)
LYMPHOCYTES NFR BLD AUTO: 14.9 % (ref 19.6–45.3)
MCH RBC QN AUTO: 30.7 PG (ref 26.6–33)
MCHC RBC AUTO-ENTMCNC: 33.3 G/DL (ref 31.5–35.7)
MCV RBC AUTO: 92.1 FL (ref 79–97)
MONOCYTES # BLD AUTO: 0.85 10*3/MM3 (ref 0.1–0.9)
MONOCYTES NFR BLD AUTO: 8.1 % (ref 5–12)
NEUTROPHILS NFR BLD AUTO: 7.74 10*3/MM3 (ref 1.7–7)
NEUTROPHILS NFR BLD AUTO: 73.6 % (ref 42.7–76)
NRBC BLD AUTO-RTO: 0 /100 WBC (ref 0–0.2)
PLATELET # BLD AUTO: 309 10*3/MM3 (ref 140–450)
PMV BLD AUTO: 9.3 FL (ref 6–12)
POTASSIUM SERPL-SCNC: 4.1 MMOL/L (ref 3.5–5.2)
RBC # BLD AUTO: 3.16 10*6/MM3 (ref 4.14–5.8)
SODIUM SERPL-SCNC: 138 MMOL/L (ref 136–145)
TIBC SERPL-MCNC: 252 MCG/DL (ref 298–536)
TRANSFERRIN SERPL-MCNC: 169 MG/DL (ref 200–360)
WBC # BLD AUTO: 10.51 10*3/MM3 (ref 3.4–10.8)

## 2020-09-30 PROCEDURE — 85025 COMPLETE CBC W/AUTO DIFF WBC: CPT | Performed by: FAMILY MEDICINE

## 2020-09-30 PROCEDURE — G0378 HOSPITAL OBSERVATION PER HR: HCPCS

## 2020-09-30 PROCEDURE — 85018 HEMOGLOBIN: CPT | Performed by: FAMILY MEDICINE

## 2020-09-30 PROCEDURE — 84466 ASSAY OF TRANSFERRIN: CPT | Performed by: FAMILY MEDICINE

## 2020-09-30 PROCEDURE — 85014 HEMATOCRIT: CPT | Performed by: FAMILY MEDICINE

## 2020-09-30 PROCEDURE — 99214 OFFICE O/P EST MOD 30 MIN: CPT | Performed by: INTERNAL MEDICINE

## 2020-09-30 PROCEDURE — 96366 THER/PROPH/DIAG IV INF ADDON: CPT

## 2020-09-30 PROCEDURE — 82728 ASSAY OF FERRITIN: CPT | Performed by: FAMILY MEDICINE

## 2020-09-30 PROCEDURE — 80048 BASIC METABOLIC PNL TOTAL CA: CPT | Performed by: FAMILY MEDICINE

## 2020-09-30 PROCEDURE — 83540 ASSAY OF IRON: CPT | Performed by: FAMILY MEDICINE

## 2020-09-30 RX ADMIN — SODIUM CHLORIDE 8 MG/HR: 900 INJECTION INTRAVENOUS at 18:00

## 2020-09-30 RX ADMIN — SODIUM CHLORIDE 100 ML/HR: 9 INJECTION, SOLUTION INTRAVENOUS at 13:15

## 2020-09-30 RX ADMIN — SODIUM CHLORIDE 8 MG/HR: 900 INJECTION INTRAVENOUS at 02:57

## 2020-09-30 RX ADMIN — SODIUM CHLORIDE 100 ML/HR: 9 INJECTION, SOLUTION INTRAVENOUS at 03:00

## 2020-09-30 RX ADMIN — SODIUM CHLORIDE 8 MG/HR: 900 INJECTION INTRAVENOUS at 13:15

## 2020-09-30 RX ADMIN — SODIUM CHLORIDE, PRESERVATIVE FREE 10 ML: 5 INJECTION INTRAVENOUS at 21:30

## 2020-09-30 RX ADMIN — SODIUM CHLORIDE 8 MG/HR: 900 INJECTION INTRAVENOUS at 07:39

## 2020-10-01 PROBLEM — R93.5 ABNORMAL ABDOMINAL CT SCAN: Status: ACTIVE | Noted: 2020-09-29

## 2020-10-01 LAB
ANION GAP SERPL CALCULATED.3IONS-SCNC: 9 MMOL/L (ref 5–15)
BASOPHILS # BLD AUTO: 0.04 10*3/MM3 (ref 0–0.2)
BASOPHILS NFR BLD AUTO: 0.5 % (ref 0–1.5)
BUN SERPL-MCNC: 15 MG/DL (ref 6–20)
BUN/CREAT SERPL: 16.7 (ref 7–25)
CALCIUM SPEC-SCNC: 8.2 MG/DL (ref 8.6–10.5)
CHLORIDE SERPL-SCNC: 108 MMOL/L (ref 98–107)
CO2 SERPL-SCNC: 23 MMOL/L (ref 22–29)
CREAT SERPL-MCNC: 0.9 MG/DL (ref 0.76–1.27)
DEPRECATED RDW RBC AUTO: 46.5 FL (ref 37–54)
EOSINOPHIL # BLD AUTO: 0.51 10*3/MM3 (ref 0–0.4)
EOSINOPHIL NFR BLD AUTO: 6.1 % (ref 0.3–6.2)
ERYTHROCYTE [DISTWIDTH] IN BLOOD BY AUTOMATED COUNT: 13.8 % (ref 12.3–15.4)
GFR SERPL CREATININE-BSD FRML MDRD: 86 ML/MIN/1.73
GLUCOSE SERPL-MCNC: 84 MG/DL (ref 65–99)
HCT VFR BLD AUTO: 28.9 % (ref 37.5–51)
HGB BLD-MCNC: 9.4 G/DL (ref 13–17.7)
IMM GRANULOCYTES # BLD AUTO: 0.03 10*3/MM3 (ref 0–0.05)
IMM GRANULOCYTES NFR BLD AUTO: 0.4 % (ref 0–0.5)
LYMPHOCYTES # BLD AUTO: 1.32 10*3/MM3 (ref 0.7–3.1)
LYMPHOCYTES NFR BLD AUTO: 15.7 % (ref 19.6–45.3)
MCH RBC QN AUTO: 30.1 PG (ref 26.6–33)
MCHC RBC AUTO-ENTMCNC: 32.5 G/DL (ref 31.5–35.7)
MCV RBC AUTO: 92.6 FL (ref 79–97)
MONOCYTES # BLD AUTO: 0.91 10*3/MM3 (ref 0.1–0.9)
MONOCYTES NFR BLD AUTO: 10.8 % (ref 5–12)
NEUTROPHILS NFR BLD AUTO: 5.58 10*3/MM3 (ref 1.7–7)
NEUTROPHILS NFR BLD AUTO: 66.5 % (ref 42.7–76)
NRBC BLD AUTO-RTO: 0 /100 WBC (ref 0–0.2)
PLATELET # BLD AUTO: 293 10*3/MM3 (ref 140–450)
PMV BLD AUTO: 9.7 FL (ref 6–12)
POTASSIUM SERPL-SCNC: 4 MMOL/L (ref 3.5–5.2)
RBC # BLD AUTO: 3.12 10*6/MM3 (ref 4.14–5.8)
SARS-COV-2 N GENE RESP QL NAA+PROBE: NOT DETECTED
SODIUM SERPL-SCNC: 140 MMOL/L (ref 136–145)
WBC # BLD AUTO: 8.39 10*3/MM3 (ref 3.4–10.8)

## 2020-10-01 PROCEDURE — 80048 BASIC METABOLIC PNL TOTAL CA: CPT | Performed by: FAMILY MEDICINE

## 2020-10-01 PROCEDURE — 85025 COMPLETE CBC W/AUTO DIFF WBC: CPT | Performed by: FAMILY MEDICINE

## 2020-10-01 PROCEDURE — 87635 SARS-COV-2 COVID-19 AMP PRB: CPT | Performed by: FAMILY MEDICINE

## 2020-10-01 PROCEDURE — 99214 OFFICE O/P EST MOD 30 MIN: CPT | Performed by: INTERNAL MEDICINE

## 2020-10-01 PROCEDURE — 96366 THER/PROPH/DIAG IV INF ADDON: CPT

## 2020-10-01 PROCEDURE — G0378 HOSPITAL OBSERVATION PER HR: HCPCS

## 2020-10-01 RX ORDER — SODIUM CHLORIDE 9 MG/ML
30 INJECTION, SOLUTION INTRAVENOUS CONTINUOUS PRN
Status: DISCONTINUED | OUTPATIENT
Start: 2020-10-01 | End: 2020-10-02 | Stop reason: HOSPADM

## 2020-10-01 RX ADMIN — SODIUM CHLORIDE 8 MG/HR: 900 INJECTION INTRAVENOUS at 21:20

## 2020-10-01 RX ADMIN — SODIUM CHLORIDE 100 ML/HR: 9 INJECTION, SOLUTION INTRAVENOUS at 00:13

## 2020-10-01 RX ADMIN — SODIUM CHLORIDE 8 MG/HR: 900 INJECTION INTRAVENOUS at 00:13

## 2020-10-01 RX ADMIN — SODIUM CHLORIDE, PRESERVATIVE FREE 10 ML: 5 INJECTION INTRAVENOUS at 08:39

## 2020-10-01 RX ADMIN — SODIUM CHLORIDE 8 MG/HR: 900 INJECTION INTRAVENOUS at 18:01

## 2020-10-01 RX ADMIN — SODIUM CHLORIDE 8 MG/HR: 900 INJECTION INTRAVENOUS at 06:30

## 2020-10-01 RX ADMIN — SODIUM CHLORIDE 100 ML/HR: 9 INJECTION, SOLUTION INTRAVENOUS at 18:02

## 2020-10-01 RX ADMIN — SODIUM CHLORIDE 8 MG/HR: 900 INJECTION INTRAVENOUS at 13:20

## 2020-10-01 RX ADMIN — SODIUM CHLORIDE 100 ML/HR: 9 INJECTION, SOLUTION INTRAVENOUS at 12:01

## 2020-10-01 RX ADMIN — SODIUM CHLORIDE, PRESERVATIVE FREE 10 ML: 5 INJECTION INTRAVENOUS at 19:58

## 2020-10-02 ENCOUNTER — ANESTHESIA (OUTPATIENT)
Dept: GASTROENTEROLOGY | Facility: HOSPITAL | Age: 60
End: 2020-10-02

## 2020-10-02 ENCOUNTER — ANESTHESIA EVENT (OUTPATIENT)
Dept: GASTROENTEROLOGY | Facility: HOSPITAL | Age: 60
End: 2020-10-02

## 2020-10-02 VITALS
BODY MASS INDEX: 25.49 KG/M2 | OXYGEN SATURATION: 100 % | DIASTOLIC BLOOD PRESSURE: 59 MMHG | HEART RATE: 70 BPM | HEIGHT: 67 IN | TEMPERATURE: 97 F | WEIGHT: 162.4 LBS | RESPIRATION RATE: 20 BRPM | SYSTOLIC BLOOD PRESSURE: 125 MMHG

## 2020-10-02 PROBLEM — K25.9 GASTRIC ULCER: Status: ACTIVE | Noted: 2020-10-02

## 2020-10-02 LAB
ANION GAP SERPL CALCULATED.3IONS-SCNC: 9 MMOL/L (ref 5–15)
BASOPHILS # BLD AUTO: 0.04 10*3/MM3 (ref 0–0.2)
BASOPHILS NFR BLD AUTO: 0.5 % (ref 0–1.5)
BUN SERPL-MCNC: 10 MG/DL (ref 6–20)
BUN/CREAT SERPL: 11.8 (ref 7–25)
CALCIUM SPEC-SCNC: 8.3 MG/DL (ref 8.6–10.5)
CHLORIDE SERPL-SCNC: 109 MMOL/L (ref 98–107)
CO2 SERPL-SCNC: 21 MMOL/L (ref 22–29)
CREAT SERPL-MCNC: 0.85 MG/DL (ref 0.76–1.27)
DEPRECATED RDW RBC AUTO: 44.6 FL (ref 37–54)
EOSINOPHIL # BLD AUTO: 0.51 10*3/MM3 (ref 0–0.4)
EOSINOPHIL NFR BLD AUTO: 6.3 % (ref 0.3–6.2)
ERYTHROCYTE [DISTWIDTH] IN BLOOD BY AUTOMATED COUNT: 13.3 % (ref 12.3–15.4)
GFR SERPL CREATININE-BSD FRML MDRD: 92 ML/MIN/1.73
GLUCOSE SERPL-MCNC: 92 MG/DL (ref 65–99)
HCT VFR BLD AUTO: 30 % (ref 37.5–51)
HGB BLD-MCNC: 10 G/DL (ref 13–17.7)
IMM GRANULOCYTES # BLD AUTO: 0.03 10*3/MM3 (ref 0–0.05)
IMM GRANULOCYTES NFR BLD AUTO: 0.4 % (ref 0–0.5)
LYMPHOCYTES # BLD AUTO: 1.24 10*3/MM3 (ref 0.7–3.1)
LYMPHOCYTES NFR BLD AUTO: 15.4 % (ref 19.6–45.3)
MCH RBC QN AUTO: 30.4 PG (ref 26.6–33)
MCHC RBC AUTO-ENTMCNC: 33.3 G/DL (ref 31.5–35.7)
MCV RBC AUTO: 91.2 FL (ref 79–97)
MONOCYTES # BLD AUTO: 0.75 10*3/MM3 (ref 0.1–0.9)
MONOCYTES NFR BLD AUTO: 9.3 % (ref 5–12)
NEUTROPHILS NFR BLD AUTO: 5.47 10*3/MM3 (ref 1.7–7)
NEUTROPHILS NFR BLD AUTO: 68.1 % (ref 42.7–76)
NRBC BLD AUTO-RTO: 0 /100 WBC (ref 0–0.2)
PLATELET # BLD AUTO: 295 10*3/MM3 (ref 140–450)
PMV BLD AUTO: 9.9 FL (ref 6–12)
POTASSIUM SERPL-SCNC: 3.9 MMOL/L (ref 3.5–5.2)
RBC # BLD AUTO: 3.29 10*6/MM3 (ref 4.14–5.8)
SODIUM SERPL-SCNC: 139 MMOL/L (ref 136–145)
WBC # BLD AUTO: 8.04 10*3/MM3 (ref 3.4–10.8)

## 2020-10-02 PROCEDURE — G0378 HOSPITAL OBSERVATION PER HR: HCPCS

## 2020-10-02 PROCEDURE — 96366 THER/PROPH/DIAG IV INF ADDON: CPT

## 2020-10-02 PROCEDURE — 80048 BASIC METABOLIC PNL TOTAL CA: CPT | Performed by: FAMILY MEDICINE

## 2020-10-02 PROCEDURE — 88305 TISSUE EXAM BY PATHOLOGIST: CPT

## 2020-10-02 PROCEDURE — 85025 COMPLETE CBC W/AUTO DIFF WBC: CPT | Performed by: FAMILY MEDICINE

## 2020-10-02 PROCEDURE — 88342 IMHCHEM/IMCYTCHM 1ST ANTB: CPT

## 2020-10-02 PROCEDURE — 25010000002 PROPOFOL 10 MG/ML EMULSION: Performed by: NURSE ANESTHETIST, CERTIFIED REGISTERED

## 2020-10-02 PROCEDURE — 43239 EGD BIOPSY SINGLE/MULTIPLE: CPT | Performed by: INTERNAL MEDICINE

## 2020-10-02 RX ORDER — LIDOCAINE HYDROCHLORIDE 20 MG/ML
INJECTION, SOLUTION INTRAVENOUS AS NEEDED
Status: DISCONTINUED | OUTPATIENT
Start: 2020-10-02 | End: 2020-10-02 | Stop reason: SURG

## 2020-10-02 RX ORDER — PROPOFOL 10 MG/ML
VIAL (ML) INTRAVENOUS AS NEEDED
Status: DISCONTINUED | OUTPATIENT
Start: 2020-10-02 | End: 2020-10-02 | Stop reason: SURG

## 2020-10-02 RX ORDER — PANTOPRAZOLE SODIUM 40 MG/1
40 TABLET, DELAYED RELEASE ORAL 2 TIMES DAILY
Qty: 60 TABLET | Refills: 0 | Status: SHIPPED | OUTPATIENT
Start: 2020-10-02 | End: 2020-10-13 | Stop reason: SDUPTHER

## 2020-10-02 RX ADMIN — SODIUM CHLORIDE 8 MG/HR: 900 INJECTION INTRAVENOUS at 01:56

## 2020-10-02 RX ADMIN — PROPOFOL 20 MG: 10 INJECTION, EMULSION INTRAVENOUS at 12:52

## 2020-10-02 RX ADMIN — SODIUM CHLORIDE 8 MG/HR: 900 INJECTION INTRAVENOUS at 07:04

## 2020-10-02 RX ADMIN — SODIUM CHLORIDE, PRESERVATIVE FREE 10 ML: 5 INJECTION INTRAVENOUS at 08:24

## 2020-10-02 RX ADMIN — PROPOFOL 100 MG: 10 INJECTION, EMULSION INTRAVENOUS at 12:51

## 2020-10-02 RX ADMIN — LIDOCAINE HYDROCHLORIDE 100 MG: 20 INJECTION, SOLUTION INTRAVENOUS at 12:51

## 2020-10-02 NOTE — ANESTHESIA POSTPROCEDURE EVALUATION
Patient: Germain Masterson    Procedure Summary     Date: 10/02/20 Room / Location: [facility] ENDOSCOPY 3 / [facility] ENDOSCOPY    Anesthesia Start: 1249 Anesthesia Stop: 1254    Procedure: ESOPHAGOGASTRODUODENOSCOPY (N/A ) Diagnosis:       Upper abdominal pain      Abnormal abdominal CT scan      (Upper abdominal pain [R10.10])      (Abnormal abdominal CT scan [R93.5])    Surgeon: Jovan Powell MD Provider: Ham Reyez CRNA    Anesthesia Type: MAC ASA Status: 2          Anesthesia Type: MAC    Vitals  No vitals data found for the desired time range.          Post Anesthesia Care and Evaluation    Patient location during evaluation: bedside  Patient participation: waiting for patient participation  Level of consciousness: responsive to verbal stimuli  Pain management: adequate  Airway patency: patent  Anesthetic complications: No anesthetic complications  PONV Status: none  Cardiovascular status: acceptable  Respiratory status: acceptable  Hydration status: acceptable

## 2020-10-02 NOTE — ANESTHESIA PREPROCEDURE EVALUATION
Anesthesia Evaluation     NPO Solid Status: > 8 hours  NPO Liquid Status: > 2 hours           Airway   Mallampati: II  TM distance: >3 FB  Neck ROM: full  no difficulty expected  Dental    (+) poor dentition    Pulmonary - normal exam   Cardiovascular - normal exam    (+) hypertension,       Neuro/Psych  GI/Hepatic/Renal/Endo      Musculoskeletal     Abdominal    Substance History      OB/GYN          Other                        Anesthesia Plan    ASA 2     MAC     intravenous induction     Anesthetic plan, all risks, benefits, and alternatives have been provided, discussed and informed consent has been obtained with: patient.

## 2020-10-07 LAB
LAB AP CASE REPORT: NORMAL
PATH REPORT.FINAL DX SPEC: NORMAL

## 2020-10-13 ENCOUNTER — HOSPITAL ENCOUNTER (OUTPATIENT)
Facility: HOSPITAL | Age: 60
Setting detail: HOSPITAL OUTPATIENT SURGERY
End: 2020-10-13
Attending: INTERNAL MEDICINE | Admitting: INTERNAL MEDICINE

## 2020-10-13 ENCOUNTER — OFFICE VISIT (OUTPATIENT)
Dept: FAMILY MEDICINE CLINIC | Facility: CLINIC | Age: 60
End: 2020-10-13

## 2020-10-13 ENCOUNTER — OFFICE VISIT (OUTPATIENT)
Dept: GASTROENTEROLOGY | Facility: CLINIC | Age: 60
End: 2020-10-13

## 2020-10-13 VITALS
TEMPERATURE: 97.5 F | OXYGEN SATURATION: 99 % | BODY MASS INDEX: 26.38 KG/M2 | HEART RATE: 86 BPM | SYSTOLIC BLOOD PRESSURE: 118 MMHG | HEIGHT: 67 IN | WEIGHT: 168.06 LBS | DIASTOLIC BLOOD PRESSURE: 72 MMHG

## 2020-10-13 VITALS
DIASTOLIC BLOOD PRESSURE: 67 MMHG | HEIGHT: 67 IN | BODY MASS INDEX: 26.56 KG/M2 | SYSTOLIC BLOOD PRESSURE: 132 MMHG | WEIGHT: 169.2 LBS | HEART RATE: 73 BPM

## 2020-10-13 DIAGNOSIS — K40.90 RIGHT INGUINAL HERNIA: ICD-10-CM

## 2020-10-13 DIAGNOSIS — Z12.11 SCREENING FOR COLON CANCER: ICD-10-CM

## 2020-10-13 DIAGNOSIS — N43.3 RIGHT HYDROCELE: ICD-10-CM

## 2020-10-13 DIAGNOSIS — Z11.59 ENCOUNTER FOR HEPATITIS C SCREENING TEST FOR LOW RISK PATIENT: ICD-10-CM

## 2020-10-13 DIAGNOSIS — K27.9 PUD (PEPTIC ULCER DISEASE): Primary | ICD-10-CM

## 2020-10-13 DIAGNOSIS — K25.9 GASTRIC ULCER WITHOUT HEMORRHAGE OR PERFORATION, UNSPECIFIED CHRONICITY: ICD-10-CM

## 2020-10-13 DIAGNOSIS — Z09 HOSPITAL DISCHARGE FOLLOW-UP: Primary | ICD-10-CM

## 2020-10-13 PROCEDURE — 99213 OFFICE O/P EST LOW 20 MIN: CPT | Performed by: STUDENT IN AN ORGANIZED HEALTH CARE EDUCATION/TRAINING PROGRAM

## 2020-10-13 PROCEDURE — 99214 OFFICE O/P EST MOD 30 MIN: CPT | Performed by: INTERNAL MEDICINE

## 2020-10-13 RX ORDER — DEXTROSE AND SODIUM CHLORIDE 5; .45 G/100ML; G/100ML
30 INJECTION, SOLUTION INTRAVENOUS CONTINUOUS PRN
Status: CANCELLED | OUTPATIENT
Start: 2020-10-13

## 2020-10-13 RX ORDER — PANTOPRAZOLE SODIUM 40 MG/1
40 TABLET, DELAYED RELEASE ORAL 2 TIMES DAILY
Qty: 60 TABLET | Refills: 0 | Status: SHIPPED | OUTPATIENT
Start: 2020-10-13 | End: 2020-11-22

## 2020-10-13 NOTE — PROGRESS NOTES
[facility]      Chief Complaint:   Chief Complaint   Patient presents with   • Follow-up     after endo       Subjective     HPI:   Patient for follow-up after EGD.  Patient had a large ulcer on the lesser curvature of the stomach.  This was negative for H. pylori.  Patient is taking his proton pump inhibitor has decreased smoking to a few cigarettes per day.  Patient is eating healthier.  Discussed with patient some dietary tips including baked chicken and toast to stay away from milk and ice cream.    Plan; we will have patient follow-up in 2 weeks for repeat EGD to evaluate for healing of this ulcer.  Patient cannot take NSAIDs or aspirin    Past Medical History:   Past Medical History:   Diagnosis Date   • Hypertension        Past Surgical History:  Past Surgical History:   Procedure Laterality Date   • CYSTECTOMY      back   • ENDOSCOPY N/A 10/2/2020    Procedure: ESOPHAGOGASTRODUODENOSCOPY;  Surgeon: Jovan Powell MD;  Location: [facility] ENDOSCOPY;  Service: Gastroenterology;  Laterality: N/A;       Family History:  Family History   Problem Relation Age of Onset   • Hypertension Mother    • Cancer Mother    • Cancer Father        Social History:   reports that he has been smoking cigars. He has been smoking about 0.50 packs per day. He has never used smokeless tobacco. He reports that he does not drink alcohol or use drugs.    Medications:   Prior to Admission medications    Medication Sig Start Date End Date Taking? Authorizing Provider   Acetaminophen (TYLENOL ARTHRITIS PAIN PO) Take 1 tablet by mouth Daily.   Yes Provider, MD Rosemarie   pantoprazole (Protonix) 40 MG EC tablet Take 1 tablet by mouth 2 (Two) Times a Day for 30 days. 10/2/20 11/1/20 Yes Patricio Georges MD       Allergies:  Patient has no known allergies.    ROS:    Review of Systems   Constitutional: Negative for activity change, appetite change and unexpected weight change.   HENT: Negative for congestion, sore  "throat and trouble swallowing.    Respiratory: Negative for cough, choking and shortness of breath.    Cardiovascular: Negative for chest pain.   Gastrointestinal: Negative for abdominal distention, abdominal pain, anal bleeding, blood in stool, constipation, diarrhea, nausea, rectal pain and vomiting.   Endocrine: Negative for heat intolerance, polydipsia and polyphagia.   Genitourinary: Negative for difficulty urinating.   Musculoskeletal: Negative for arthralgias.   Skin: Negative for color change, pallor, rash and wound.   Allergic/Immunologic: Negative for food allergies.   Neurological: Negative for dizziness, syncope, weakness and headaches.   Psychiatric/Behavioral: Negative for agitation, behavioral problems, confusion and decreased concentration.     Objective     Blood pressure 132/67, pulse 73, height 170.2 cm (67\"), weight 76.7 kg (169 lb 3.2 oz).    Physical Exam  Constitutional:       General: He is not in acute distress.     Appearance: He is well-developed. He is not diaphoretic.   HENT:      Head: Normocephalic and atraumatic.   Cardiovascular:      Rate and Rhythm: Normal rate and regular rhythm.      Heart sounds: Normal heart sounds. No murmur. No friction rub. No gallop.    Pulmonary:      Effort: No respiratory distress.      Breath sounds: Normal breath sounds. No wheezing or rales.   Chest:      Chest wall: No tenderness.   Abdominal:      General: Bowel sounds are normal. There is no distension.      Palpations: Abdomen is soft. There is no mass.      Tenderness: There is no abdominal tenderness. There is no guarding or rebound.      Hernia: No hernia is present.   Musculoskeletal: Normal range of motion.   Skin:     General: Skin is warm and dry.      Coloration: Skin is not pale.      Findings: No erythema or rash.   Neurological:      Mental Status: He is alert and oriented to person, place, and time.   Psychiatric:         Behavior: Behavior normal.         Thought Content: Thought " content normal.         Judgment: Judgment normal.          Assessment/Plan   Diagnoses and all orders for this visit:    1. PUD (peptic ulcer disease) (Primary)  -     Case Request; Standing  -     dextrose 5 % and sodium chloride 0.45 % infusion  -     Case Request    2. Gastric ulcer without hemorrhage or perforation, unspecified chronicity    Other orders  -     Follow Anesthesia Guidelines / Standing Orders; Future  -     Obtain Informed Consent; Future  -     Implement Anesthesia Orders Day of Procedure; Standing  -     Obtain Informed Consent; Standing  -     POC Glucose Once; Standing  -     Insert Peripheral IV; Standing        ESOPHAGOGASTRODUODENOSCOPY (N/A)     Diagnosis Plan   1. PUD (peptic ulcer disease)  Case Request    dextrose 5 % and sodium chloride 0.45 % infusion    Case Request   2. Gastric ulcer without hemorrhage or perforation, unspecified chronicity         Anticipated Surgical Procedure:  Orders Placed This Encounter   Procedures   • Follow Anesthesia Guidelines / Standing Orders     Standing Status:   Future   • Obtain Informed Consent     Standing Status:   Future     Order Specific Question:   Informed Consent Given For     Answer:   ESOPHAGOGASTRODUODENOSCOPY       The risks, benefits, and alternatives of this procedure have been discussed with the patient or the responsible party- the patient understands and agrees to proceed.

## 2020-10-13 NOTE — PATIENT INSTRUCTIONS
"BMI for Adults  What is BMI?  Body mass index (BMI) is a number that is calculated from a person's weight and height. BMI can help estimate how much of a person's weight is composed of fat. BMI does not measure body fat directly. Rather, it is an alternative to procedures that directly measure body fat, which can be difficult and expensive.  BMI can help identify people who may be at higher risk for certain medical problems.  What are BMI measurements used for?  BMI is used as a screening tool to identify possible weight problems. It helps determine whether a person is obese, overweight, a healthy weight, or underweight.  BMI is useful for:  · Identifying a weight problem that may be related to a medical condition or may increase the risk for medical problems.  · Promoting changes, such as changes in diet and exercise, to help reach a healthy weight. BMI screening can be repeated to see if these changes are working.  How is BMI calculated?  BMI involves measuring your weight in relation to your height. Both height and weight are measured, and the BMI is calculated from those numbers. This can be done either in English (U.S[place] or metric measurements. Note that charts and online BMI calculators are available to help you find your BMI quickly and easily without having to do these calculations yourself.  To calculate your BMI in English (U.S.) measurements:    1. Measure your weight in pounds (lb).  2. Multiply the number of pounds by 703.  ? For example, for a person who weighs 180 lb, multiply that number by 703, which equals 126,540.  3. Measure your height in inches. Then multiply that number by itself to get a measurement called \"inches squared.\"  ? For example, for a person who is 70 inches tall, the \"inches squared\" measurement is 70 inches x 70 inches, which equals 4,900 inches squared.  4. Divide the total from step 2 (number of lb x 703) by the total from step 3 (inches squared): 126,540 ÷ 4,900 = 25.8. This is " "your BMI.  To calculate your BMI in metric measurements:  1. Measure your weight in kilograms (kg).  2. Measure your height in meters (m). Then multiply that number by itself to get a measurement called \"meters squared.\"  ? For example, for a person who is 1.75 m tall, the \"meters squared\" measurement is 1.75 m x 1.75 m, which is equal to 3.1 meters squared.  3. Divide the number of kilograms (your weight) by the meters squared number. In this example: 70 ÷ 3.1 = 22.6. This is your BMI.  What do the results mean?  BMI charts are used to identify whether you are underweight, normal weight, overweight, or obese. The following guidelines will be used:  · Underweight: BMI less than 18.5.  · Normal weight: BMI between 18.5 and 24.9.  · Overweight: BMI between 25 and 29.9.  · Obese: BMI of 30 or above.  Keep these notes in mind:  · Weight includes both fat and muscle, so someone with a muscular build, such as an athlete, may have a BMI that is higher than 24.9. In cases like these, BMI is not an accurate measure of body fat.  · To determine if excess body fat is the cause of a BMI of 25 or higher, further assessments may need to be done by a health care provider.  · BMI is usually interpreted in the same way for men and women.  Where to find more information  For more information about BMI, including tools to quickly calculate your BMI, go to these websites:  · Centers for Disease Control and Prevention: www.cdc.gov  · American Heart Association: www.heart.org  · National Heart, Lung, and Blood Stella: www.nhlbi.nih.gov  Summary  · Body mass index (BMI) is a number that is calculated from a person's weight and height.  · BMI may help estimate how much of a person's weight is composed of fat. BMI can help identify those who may be at higher risk for certain medical problems.  · BMI can be measured using English measurements or metric measurements.  · BMI charts are used to identify whether you are underweight, normal " weight, overweight, or obese.  This information is not intended to replace advice given to you by your health care provider. Make sure you discuss any questions you have with your health care provider.  Document Released: 08/29/2005 Document Revised: 09/09/2020 Document Reviewed: 07/17/2020  Elsevier Patient Education © 2020 Elsevier Inc.

## 2020-10-14 NOTE — PROGRESS NOTES
Subjective:     Germain Masterson is a 59 y.o. male who presents for a hospital follow up for upper abdominal pain and associated gastric ulcer and to establish care.    Hospital Follow Up:   The patient was admitted at [facility] from 9/29/2020 to 10/2/2020 for upper abdominal pain and associated gastric ulcer. Patient did not have any difficulty filling all of the prescriptions from the discharge. Since his discharge, he seems to be improving. Had gen surg evaluate in ED due to anemia and concern for acute abdomen and determine he did not need emergent surgery. Placed on protonix drip and GI consulted; EGD performed by Dr. Hernandez showing a non bleeding gastric ulcer. Stable for discharge on twice daily Protonix.     He was seen for follow up by GI, Dr. Hernandez, with plan to repeat EGD to evaluate healing of ulcer. He was also advised to avoid aspirin and NSAIDs. He does not drink. Currently smoking 5cig/day. Was smoking 1PPD for 20 years previously. Wants to try to quit on his own.     He would additionally like a re-evaluation of right inguinal hernia. He was evaluated 1/2020 who were not able to demonstrate hernia at that time. During hospitalization an abdominal/pelvis CT showing fat containing right sided inguinal hernia with right hydrocele. He does endorse waxing and waning increases in size of right scrotum. When scrotum enlarge it is painful to walk. He feels constant pressure in his inguinal area.     No other complaints.     Past Medical Hx:  Past Medical History:   Diagnosis Date   • Hypertension        Past Surgical Hx:  Past Surgical History:   Procedure Laterality Date   • CYSTECTOMY      back   • ENDOSCOPY N/A 10/2/2020    Procedure: ESOPHAGOGASTRODUODENOSCOPY;  Surgeon: Jovan Hernandez MD;  Location: [facility] ENDOSCOPY;  Service: Gastroenterology;  Laterality: N/A;       Health Maintenance:  Health Maintenance   Topic Date Due   • COLONOSCOPY  1960   • ANNUAL PHYSICAL   11/28/1963   • Pneumococcal Vaccine 0-64 (1 of 1 - PPSV23) 11/28/1966   • ZOSTER VACCINE (1 of 2) 11/28/2010   • HEPATITIS C SCREENING  12/30/2019   • INFLUENZA VACCINE  10/13/2021 (Originally 8/1/2020)   • TDAP/TD VACCINES (2 - Td) 12/26/2029       Current Meds:    Current Outpatient Medications:   •  Acetaminophen (TYLENOL ARTHRITIS PAIN PO), Take 1 tablet by mouth Daily., Disp: , Rfl:   •  pantoprazole (Protonix) 40 MG EC tablet, Take 1 tablet by mouth 2 (Two) Times a Day for 30 days., Disp: 60 tablet, Rfl: 0  Current outpatient and discharge medications have been reconciled for the patient.  Reviewed by: Derick Peng MD    Allergies:  Patient has no known allergies.    Family Hx:  Family History   Problem Relation Age of Onset   • Hypertension Mother    • Cancer Mother    • Cancer Father         Social History:  Social History     Socioeconomic History   • Marital status: Single     Spouse name: Not on file   • Number of children: Not on file   • Years of education: Not on file   • Highest education level: Not on file   Tobacco Use   • Smoking status: Current Every Day Smoker     Packs/day: 0.50     Types: Cigars   • Smokeless tobacco: Never Used   Substance and Sexual Activity   • Alcohol use: No     Frequency: Never   • Drug use: No   • Sexual activity: Yes     Partners: Female     Birth control/protection: None       Review of Systems  Review of Systems   Constitutional: Negative for appetite change, diaphoresis, fatigue and fever.   HENT: Negative for ear pain, postnasal drip, rhinorrhea and sore throat.    Eyes: Negative for pain and visual disturbance.   Respiratory: Negative for cough, chest tightness and shortness of breath.    Cardiovascular: Negative for chest pain, palpitations and leg swelling.   Gastrointestinal: Negative for abdominal pain, constipation, diarrhea, nausea and vomiting.   Genitourinary: Negative for dysuria, flank pain, frequency and urgency.   Musculoskeletal: Negative for  "arthralgias, back pain and myalgias.        Pain in right inguinal area   Skin: Negative for pallor and rash.   Neurological: Negative for dizziness, seizures, syncope, weakness and numbness.   Psychiatric/Behavioral: Negative for agitation, confusion, decreased concentration and dysphoric mood.       Objective:     /72   Pulse 86   Temp 97.5 °F (36.4 °C) (Temporal)   Ht 170.2 cm (67\")   Wt 76.2 kg (168 lb 1 oz)   SpO2 99%   BMI 26.32 kg/m²   Physical Exam  Constitutional:       Appearance: He is well-developed. He is not diaphoretic.   HENT:      Head: Normocephalic and atraumatic.      Nose: Nose normal.   Eyes:      Conjunctiva/sclera: Conjunctivae normal.      Pupils: Pupils are equal, round, and reactive to light.   Neck:      Musculoskeletal: Normal range of motion and neck supple.      Thyroid: No thyromegaly.      Trachea: No tracheal deviation.   Cardiovascular:      Rate and Rhythm: Normal rate and regular rhythm.      Heart sounds: Normal heart sounds. No murmur. No friction rub. No gallop.    Pulmonary:      Effort: Pulmonary effort is normal.      Breath sounds: Normal breath sounds. No wheezing or rales.   Abdominal:      General: Bowel sounds are normal. There is no distension.      Palpations: Abdomen is soft.      Tenderness: There is no abdominal tenderness. There is no rebound.      Hernia: No hernia is present.   Genitourinary:     Comments: Enlarged right scrotum with fullness of right inguinal area greater on right than left. Inguinal area tender, scrotum not tender.  Musculoskeletal: Normal range of motion.   Skin:     General: Skin is warm and dry.      Capillary Refill: Capillary refill takes less than 2 seconds.   Neurological:      Mental Status: He is alert and oriented to person, place, and time.       Assessment/Plan:     1. Hospital discharge follow-up    2. Right inguinal hernia    3. Right hydrocele    4. Screening for colon cancer    5. Encounter for hepatitis C " screening test for low risk patient       1. Advised patient to quit smoking as this will increase his risk of further gastric ulcers. Advised adherence to GI treatment plans and recommendations. Advised to avoid all NSAIDs. Recommended tylenol 1000 mg every 8 hours as needed for pain.     2/3. Worsening, not controlled. Patient would like a re-evaluation of right inguinal hernia. Recent imaging confirms right sided inguinal hernia and right hydrocele. Having symptoms of pain and discomfort with walking. Will refer to general surgery for re-evaluation and management as indicated.     4. Will refer to Dr. Hernandez as he is already established for screening colonoscopy. Advised patient that this may be deferred while currently evaluating his gastric ulcer. He is agreeable to wait if needed but wants his screening colonoscopy.    5. Ordered Hep C Ab      Follow-up:     Return in about 2 months (around 12/13/2020) for Next scheduled follow up.      Goals        Patient Stated    • Less pain (pt-stated)      Barriers to Goals: None.            Preventative:    Vaccines Recommended at this visit:   Recommended Influenza, PCV13, and Zoster- refused    Vaccines Received at this visit:  Refused recommended vaccines    Screenings Recommended at this visit:  Colonoscopy    Screenings Ordered at this visit:  Colonoscopy    Smoking Status:  Current smoker, 5 cig/day; previously smoking 20 years, 1PPD. Would like to try to quit on own. Offered pharmacotherapy. Provided with [place] [facility].     Alcohol Intake:  Patient does not drink    Patient's Body mass index is 26.32 kg/m². BMI is above normal parameters. Recommendations include: exercise counseling and nutrition counseling.         RISK SCORE: 4       Derick Peng M.D. PGY3  [facility]  [address] [place] [address]  Office: [phone]    This document has been electronically signed by Derick Peng MD on  October 13, 2020 21:49 CDT

## 2020-10-14 NOTE — PROGRESS NOTES
I have spoken with the patient.  I have reviewed the notes, assessments, and/or procedures performed by Dr. Wai Peng, I concur with his  documentation and assessment and plan for Germain Masterson.          This document has been electronically signed by Andry Aquino MD on October 14, 2020 10:51 CDT

## 2020-11-22 RX ORDER — PANTOPRAZOLE SODIUM 40 MG/1
TABLET, DELAYED RELEASE ORAL
Qty: 60 TABLET | Refills: 0 | Status: SHIPPED | OUTPATIENT
Start: 2020-11-22 | End: 2020-12-11 | Stop reason: SDUPTHER

## 2020-12-11 ENCOUNTER — PATIENT OUTREACH (OUTPATIENT)
Dept: FAMILY MEDICINE CLINIC | Facility: CLINIC | Age: 60
End: 2020-12-11

## 2020-12-11 ENCOUNTER — TELEPHONE (OUTPATIENT)
Dept: FAMILY MEDICINE CLINIC | Facility: CLINIC | Age: 60
End: 2020-12-11

## 2020-12-11 RX ORDER — PANTOPRAZOLE SODIUM 40 MG/1
40 TABLET, DELAYED RELEASE ORAL 2 TIMES DAILY
Qty: 60 TABLET | Refills: 3 | Status: SHIPPED | OUTPATIENT
Start: 2020-12-11 | End: 2021-03-04 | Stop reason: SDUPTHER

## 2020-12-11 NOTE — TELEPHONE ENCOUNTER
Pt called requesting a refill of pantoprazole (PROTONIX) 40 MG EC tablet sent to [facility].    Call back number is   [phone]    Thanks,   Zena

## 2021-03-04 ENCOUNTER — OFFICE VISIT (OUTPATIENT)
Dept: FAMILY MEDICINE CLINIC | Facility: CLINIC | Age: 61
End: 2021-03-04

## 2021-03-04 VITALS
SYSTOLIC BLOOD PRESSURE: 144 MMHG | DIASTOLIC BLOOD PRESSURE: 64 MMHG | BODY MASS INDEX: 27.48 KG/M2 | HEART RATE: 72 BPM | OXYGEN SATURATION: 96 % | WEIGHT: 175.1 LBS | TEMPERATURE: 98 F | HEIGHT: 67 IN

## 2021-03-04 DIAGNOSIS — Z23 ENCOUNTER FOR ADMINISTRATION OF VACCINE: ICD-10-CM

## 2021-03-04 DIAGNOSIS — K25.9 GASTRIC ULCER WITHOUT HEMORRHAGE OR PERFORATION, UNSPECIFIED CHRONICITY: Primary | ICD-10-CM

## 2021-03-04 DIAGNOSIS — K27.9 PUD (PEPTIC ULCER DISEASE): ICD-10-CM

## 2021-03-04 PROCEDURE — 99213 OFFICE O/P EST LOW 20 MIN: CPT | Performed by: STUDENT IN AN ORGANIZED HEALTH CARE EDUCATION/TRAINING PROGRAM

## 2021-03-04 RX ORDER — PANTOPRAZOLE SODIUM 40 MG/1
40 TABLET, DELAYED RELEASE ORAL 2 TIMES DAILY
Qty: 60 TABLET | Refills: 3 | Status: SHIPPED | OUTPATIENT
Start: 2021-03-04 | End: 2021-09-30

## 2021-03-04 NOTE — PROGRESS NOTES
Family Medicine Residency  Ashlyn Lira MD    Subjective:     Germain Masterson is a 60 y.o. male who presents for PUD and care gaps.    PUD  Patient had a perforated gastric ulcer repair by Dr. Powell on 9/29/2020, this is a follow-up visit to see how he is doing.  We will start Protonix 40 mg twice daily, states he has been doing well he needs a refill.  States that he was told this was due to heavy alcohol drinking which he has stopped since.  Patient also had a colonoscopy plan on 10/28/2020 with Dr. Powell, and decided to cancel that.  Patient states that he is not interested in colon cancer screening at this time.    Care Gaps  Patient is up-to-date on all other vaccinations, only requires the shingles vaccine.  Amenable to receiving the vaccine at his pharmacy.       The following portions of the patient's history were reviewed and updated as appropriate: allergies, current medications, past family history, past medical history, past social history, past surgical history and problem list.    Past Medical Hx:  Past Medical History:   Diagnosis Date   • Hypertension        Past Surgical Hx:  Past Surgical History:   Procedure Laterality Date   • CYSTECTOMY      back   • ENDOSCOPY N/A 10/2/2020    Procedure: ESOPHAGOGASTRODUODENOSCOPY;  Surgeon: Jovan Powell MD;  Location: [facility] ENDOSCOPY;  Service: Gastroenterology;  Laterality: N/A;       Current Meds:    Current Outpatient Medications:   •  Acetaminophen (TYLENOL ARTHRITIS PAIN PO), Take 1 tablet by mouth Daily., Disp: , Rfl:   •  pantoprazole (PROTONIX) 40 MG EC tablet, Take 1 tablet by mouth 2 (Two) Times a Day., Disp: 60 tablet, Rfl: 3  •  Zoster Vac Recomb Adjuvanted 50 MCG/0.5ML reconstituted suspension, Inject 0.5 mL into the appropriate muscle as directed by prescriber 1 (One) Time for 1 dose., Disp: 1 each, Rfl: 0    Allergies:  No Known Allergies    Family Hx:  Family History   Problem Relation Age of Onset   • Hypertension Mother    •  "Cancer Mother    • Cancer Father         Social History:  Social History     Socioeconomic History   • Marital status: Single     Spouse name: Not on file   • Number of children: Not on file   • Years of education: Not on file   • Highest education level: Not on file   Tobacco Use   • Smoking status: Current Every Day Smoker     Packs/day: 0.50     Years: 45.00     Pack years: 22.50     Types: Cigars   • Smokeless tobacco: Never Used   • Tobacco comment: Used to smoke cigarettes for 15 years 1.5ppd   Substance and Sexual Activity   • Alcohol use: No     Frequency: Never   • Drug use: No   • Sexual activity: Yes     Partners: Female     Birth control/protection: None       Review of Systems  Review of Systems   Constitutional: Negative for chills and fever.   HENT: Negative for rhinorrhea and sore throat.    Eyes: Negative for photophobia and visual disturbance.   Respiratory: Negative for cough and shortness of breath.    Cardiovascular: Negative for chest pain and palpitations.   Gastrointestinal: Negative for abdominal pain, nausea and vomiting.   Genitourinary: Positive for scrotal swelling (Patient was found to have a right inguinal hernia on CT abdomen pelvis). Negative for difficulty urinating and dysuria.   Musculoskeletal: Negative for arthralgias and back pain.   Neurological: Negative for weakness, light-headedness and headaches.   Psychiatric/Behavioral: Negative for dysphoric mood and sleep disturbance.   All other systems reviewed and are negative.      Objective:     /64   Pulse 72   Temp 98 °F (36.7 °C)   Ht 170.2 cm (67\")   Wt 79.4 kg (175 lb 1.6 oz)   SpO2 96%   BMI 27.42 kg/m²   Physical Exam  Vitals signs reviewed.   Constitutional:       Appearance: He is well-developed.      Interventions: Face mask in place.   HENT:      Head: Normocephalic.      Right Ear: Hearing and external ear normal.      Left Ear: Hearing and external ear normal.      Nose: Nose normal.      Mouth/Throat:     "  Lips: Pink.      Mouth: Mucous membranes are moist.   Eyes:      General: Lids are normal.      Conjunctiva/sclera: Conjunctivae normal.      Pupils: Pupils are equal, round, and reactive to light.   Neck:      Musculoskeletal: Neck supple.   Cardiovascular:      Rate and Rhythm: Normal rate and regular rhythm.      Pulses: Normal pulses.      Heart sounds: Normal heart sounds. No murmur. No friction rub. No gallop.    Pulmonary:      Effort: Pulmonary effort is normal.      Breath sounds: Normal breath sounds and air entry. No wheezing, rhonchi or rales.   Abdominal:      General: Bowel sounds are normal.      Palpations: Abdomen is soft.   Skin:     General: Skin is warm.   Neurological:      Mental Status: He is alert and oriented to person, place, and time.   Psychiatric:         Attention and Perception: Attention and perception normal.         Mood and Affect: Mood and affect normal.         Speech: Speech normal.         Behavior: Behavior normal. Behavior is cooperative.         Thought Content: Thought content normal.         Cognition and Memory: Cognition and memory normal.         Judgment: Judgment normal.          Assessment/Plan:     Diagnoses and all orders for this visit:    1. Gastric ulcer without hemorrhage or perforation, unspecified chronicity (Primary)  -     pantoprazole (PROTONIX) 40 MG EC tablet; Take 1 tablet by mouth 2 (Two) Times a Day.  Dispense: 60 tablet; Refill: 3    2. PUD (peptic ulcer disease)  -     pantoprazole (PROTONIX) 40 MG EC tablet; Take 1 tablet by mouth 2 (Two) Times a Day.  Dispense: 60 tablet; Refill: 3    3. Encounter for administration of vaccine  -     Zoster Vac Recomb Adjuvanted 50 MCG/0.5ML reconstituted suspension; Inject 0.5 mL into the appropriate muscle as directed by prescriber 1 (One) Time for 1 dose.  Dispense: 1 each; Refill: 0      1. Refills as above, vaccine sent to preferred pharmacy.    2.  Colon cancer screening was discussed with the patient, as  "an alternative to a colonoscopy I discussed with the patient the use of Cologuard.  He states that at this time he will consider it and on a follow-up visit will decide if he prefers that method.  Risks and benefits were discussed and acknowledged.    3.  Discussion of his right inguinal hernia was also performed, patient states that he had seen Dr. Hunter while admitted to the hospital for his peptic ulcer disease, and was informed then of the surgery.  Patient states that he prefers to wait until it \"bothers me to the point I want to do something about it\", and will pursue a surgical referral at that time.  Risks and benefits of the surgery were discussed and acknowledged.    4.  He also wanted to discuss persistent leg cramping.  Patient admitted to not drinking enough water, advised to increase water intake.  States that he eats several bananas, not likely concern for potassium deficiency.  Patient will need lab work.  Consider restless leg syndrome diagnosis.  Patient understood and acknowledged education, counseling, medication/treatment benefits and side effects. Risks, complications, and expectations of outcomes were also addressed, discussed, and acknowledged. All questions were answered and addressed.     · Rx changes: As above.  · Patient Education: As above.  · Compliance at present is estimated to be fair.   · Efforts to improve compliance (if necessary) will be directed at Continued physician-patient relationship.    Depression screening: Depression screening performed today; result negative; no follow up needed     Follow-up:     Return in about 2 weeks (around 3/18/2021) for Leg cramps.    FOLLOW UP: (Possible restless leg syndrome work-up.)    Preventative:  Health Maintenance   Topic Date Due   • COLONOSCOPY  1960   • ZOSTER VACCINE (1 of 2) 11/28/2010   • HEPATITIS C SCREENING  12/30/2019   • Pneumococcal Vaccine 0-64 (1 of 1 - PPSV23) 10/13/2021 (Originally 11/28/1966)   • INFLUENZA " VACCINE  10/13/2021 (Originally 8/1/2020)   • ANNUAL PHYSICAL  10/14/2021   • TDAP/TD VACCINES (2 - Td) 12/26/2029   • MENINGOCOCCAL VACCINE  Aged Out     Male Preventative: Colon cancer screening was discussed, patient will consider Cologuard as an option at a future visit.  Recommended: Shingrix.  Vaccine Counseling: Sent to his [facility] pharmacy.  Interest in Covid Vaccine: Not interested, number to [facility] Covid Vaccine Administration provided to schedule. [phone] option 1.     Weight  -Class: Overweight: 25.0-29.9kg/m2   -Patient's Body mass index is 27.42 kg/m². BMI is above normal parameters. Recommendations include: exercise counseling, nutrition counseling and referral to primary care.   eat more fruits and vegetables, decrease soda or juice intake, increase water intake, increase physical activity, reduce portion size, cut out extra servings, reduce fast food intake, plan meals, and have 3 meals a day    Alcohol use:  reports no history of alcohol use.  Nicotine status  reports that he has been smoking cigars. He has a 22.50 pack-year smoking history. He has never used smokeless tobacco.    Goals        Patient Stated    • Less pain (pt-stated)      Barriers to Goals: None.            RISK SCORE: 2      This document has been electronically signed by Ashlyn Lira MD on March 4, 2021 17:10 CST

## 2021-03-05 NOTE — PROGRESS NOTES
I have seen the patient.  I have reviewed the notes, assessments, and/or procedures performed by Dr. Lira, I concur with her/his documentation and assessment and plan for Germain Masterson.               This document has been electronically signed by Donn Hammond MD on March 5, 2021 13:37 CST

## 2021-07-13 ENCOUNTER — OFFICE VISIT (OUTPATIENT)
Dept: FAMILY MEDICINE CLINIC | Facility: CLINIC | Age: 61
End: 2021-07-13

## 2021-07-13 VITALS
TEMPERATURE: 98.4 F | DIASTOLIC BLOOD PRESSURE: 72 MMHG | HEART RATE: 83 BPM | BODY MASS INDEX: 28.63 KG/M2 | HEIGHT: 67 IN | OXYGEN SATURATION: 98 % | SYSTOLIC BLOOD PRESSURE: 116 MMHG | WEIGHT: 182.44 LBS

## 2021-07-13 DIAGNOSIS — K40.90 INGUINAL HERNIA OF RIGHT SIDE WITHOUT OBSTRUCTION OR GANGRENE: ICD-10-CM

## 2021-07-13 DIAGNOSIS — K13.79 ORAL PAIN: Primary | ICD-10-CM

## 2021-07-13 PROCEDURE — 99213 OFFICE O/P EST LOW 20 MIN: CPT | Performed by: STUDENT IN AN ORGANIZED HEALTH CARE EDUCATION/TRAINING PROGRAM

## 2021-07-13 NOTE — PROGRESS NOTES
I have seen the patient.  I have reviewed the notes, assessments, and/or procedures performed by Dr. Yee, I concur with her/his documentation and assessment and plan for Germain Masterson.               This document has been electronically signed by Donn Hammond MD on July 13, 2021 18:40 CDT

## 2021-07-13 NOTE — PROGRESS NOTES
"  Family Medicine Residency  Rene Yee MD    Subjective:     Germain Masterson is a 60 y.o. male who presents for mouth pain.  For the last 2 weeks patient has had 2 occurrences of a \"lump\" developed on the roof of his mouth.  Patient felt like there was pus in the lump but did not pop it and did not have exudate.  Patient gargled salt water and in the morning they were gone.  These are not present right now.  Patient has not seen a dentist in recent history.  Stated that he wanted to but Covid.  No fever or headache associated.  Patient does still smoke approximately half pack a day.    Patient is also had worsening right inguinal and scrotal pain.  Patient feels this mostly at work when he is straining, lifting and moving at the hip.  He does feel that it sometimes goes over to the left side of his scrotum as well.  Patient denies that this is never become incarcerated.  Patient has known about this hernia for a while but wanted to wait to do anything about it until it became more bothersome.    The following portions of the patient's history were reviewed and updated as appropriate: allergies, current medications, past family history, past medical history, past social history, past surgical history and problem list.    Past Medical Hx:  Past Medical History:   Diagnosis Date   • Hypertension        Past Surgical Hx:  Past Surgical History:   Procedure Laterality Date   • CYSTECTOMY      back   • ENDOSCOPY N/A 10/2/2020    Procedure: ESOPHAGOGASTRODUODENOSCOPY;  Surgeon: Jovan Powell MD;  Location: [facility] ENDOSCOPY;  Service: Gastroenterology;  Laterality: N/A;       Current Meds:    Current Outpatient Medications:   •  Acetaminophen (TYLENOL ARTHRITIS PAIN PO), Take 1 tablet by mouth Daily., Disp: , Rfl:   •  pantoprazole (PROTONIX) 40 MG EC tablet, Take 1 tablet by mouth 2 (Two) Times a Day., Disp: 60 tablet, Rfl: 3    Allergies:  No Known Allergies    Family Hx:  Family History   Problem Relation Age " "of Onset   • Hypertension Mother    • Cancer Mother    • Cancer Father         Social History:  Social History     Socioeconomic History   • Marital status: Single     Spouse name: Not on file   • Number of children: Not on file   • Years of education: Not on file   • Highest education level: Not on file   Tobacco Use   • Smoking status: Current Every Day Smoker     Packs/day: 0.50     Years: 45.00     Pack years: 22.50     Types: Cigars   • Smokeless tobacco: Never Used   • Tobacco comment: Used to smoke cigarettes for 15 years 1.5ppd   Substance and Sexual Activity   • Alcohol use: No   • Drug use: No   • Sexual activity: Yes     Partners: Female     Birth control/protection: None       Review of Systems  Review of Systems   Constitutional: Negative for chills, fatigue and fever.   HENT: Positive for dental problem and mouth sores (not actively today). Negative for drooling, ear pain, hearing loss, postnasal drip and sore throat.    Eyes: Negative for photophobia and pain.   Respiratory: Negative for cough, shortness of breath and wheezing.    Cardiovascular: Negative for chest pain, palpitations and leg swelling.   Gastrointestinal: Negative for abdominal pain, constipation, diarrhea, nausea and vomiting.        Worsening hernia   Genitourinary: Positive for scrotal swelling. Negative for difficulty urinating, dysuria and testicular pain.   Musculoskeletal: Negative for arthralgias and myalgias.   Skin: Negative for rash and wound.   Neurological: Negative for syncope, weakness and headaches.   Psychiatric/Behavioral: Negative for dysphoric mood. The patient is not nervous/anxious.        Objective:     /72   Pulse 83   Temp 98.4 °F (36.9 °C)   Ht 170.2 cm (67\")   Wt 82.8 kg (182 lb 7 oz)   SpO2 98%   BMI 28.57 kg/m²   Physical Exam  Vitals reviewed.   Constitutional:       General: He is not in acute distress.     Appearance: He is not toxic-appearing.   HENT:      Head: Normocephalic and atraumatic. "      Nose: No congestion or rhinorrhea.      Mouth/Throat:      Mouth: Mucous membranes are moist. No injury or oral lesions.      Dentition: Abnormal dentition. Dental caries present. No gingival swelling or dental abscesses.      Tongue: No lesions.      Pharynx: Oropharynx is clear. Uvula midline.     Eyes:      Conjunctiva/sclera: Conjunctivae normal.      Pupils: Pupils are equal, round, and reactive to light.   Cardiovascular:      Rate and Rhythm: Normal rate and regular rhythm.      Pulses: Normal pulses.      Heart sounds: No murmur heard.     Pulmonary:      Effort: Pulmonary effort is normal. No respiratory distress.      Breath sounds: Normal breath sounds.   Abdominal:      General: Abdomen is flat. Bowel sounds are normal. There is no distension.      Palpations: Abdomen is soft.      Hernia: A hernia is present. Hernia is present in the right inguinal area.   Musculoskeletal:         General: No swelling or deformity.   Skin:     General: Skin is warm and dry.   Neurological:      General: No focal deficit present.      Mental Status: He is alert. Mental status is at baseline.          Assessment/Plan:     Diagnoses and all orders for this visit:    1. Oral pain (Primary)    2. Inguinal hernia of right side without obstruction or gangrene      Oral pain: Patient requested referral to dentistry.  Advised patient to continue saline gargling if symptoms recur.    Inguinal hernia of right side: CT abdomen pelvis in September revealed large right fat containing inguinal hernia and right hydrocele.  On exam this area was tender.  Per chart review, patient scrotum had not been tender previously but was tender today.  Patient denies pain at baseline without straining.  Patient requested referral to surgery today to see further options.  He was concerned about length of time to recovery.  Reassured patient on these issues.  Considered repeat imaging either CT or ultrasound at surgery discretion.  Happy to  order either if the surgeon would like.  Patient has not had an incarcerated hernia thus far.  Discussed signs and symptoms of incarceration of hernia with patient.    · Rx changes: none  · Patient Education: signs of incarceration   · Compliance at present is estimated to be adequate.   · Efforts to improve compliance (if necessary) will be directed at increased exercise.    Depression screening: Up to date; last screen 3/4/2021     Follow-up:     No follow-ups on file.    Preventative:  Health Maintenance   Topic Date Due   • COLORECTAL CANCER SCREENING  Never done   • COVID-19 Vaccine (1) Never done   • ZOSTER VACCINE (1 of 2) Never done   • LUNG CANCER SCREENING  Never done   • HEPATITIS C SCREENING  Never done   • Pneumococcal Vaccine 0-64 (1 of 1 - PPSV23) 10/13/2021 (Originally 11/28/1966)   • INFLUENZA VACCINE  08/01/2021   • ANNUAL PHYSICAL  10/14/2021   • TDAP/TD VACCINES (2 - Td or Tdap) 12/26/2029     Male Preventative: needs colonoscopy, declined  Recommended: covid  Vaccine Counseling: patient declined    Weight  -Class: Overweight: 25.0-29.9kg/m2   -Patient's Body mass index is 28.57 kg/m². indicating that he is overweight (BMI 25-29.9). Obesity-related health conditions include the following: hypertension. Obesity is unchanged. BMI is is above average; BMI management plan is completed. We discussed portion control and increasing exercise..   eat more fruits and vegetables, decrease soda or juice intake and increase water intake    Alcohol use:  reports no history of alcohol use.  Nicotine status  reports that he has been smoking cigars. He has a 22.50 pack-year smoking history. He has never used smokeless tobacco.    Goals     •  Less pain (pt-stated)       Barriers to Goals: None.            RISK SCORE: 3       Rene Yee MD   PGY-2    [facility]  [address] [place] [address]  Office: [phone]    This document has been electronically signed by Rene  MD Joselito on July 13, 2021 15:20 CDT

## 2021-09-30 DIAGNOSIS — K27.9 PUD (PEPTIC ULCER DISEASE): ICD-10-CM

## 2021-09-30 DIAGNOSIS — K25.9 GASTRIC ULCER WITHOUT HEMORRHAGE OR PERFORATION, UNSPECIFIED CHRONICITY: ICD-10-CM

## 2021-09-30 RX ORDER — PANTOPRAZOLE SODIUM 40 MG/1
TABLET, DELAYED RELEASE ORAL
Qty: 60 TABLET | Refills: 3 | Status: SHIPPED | OUTPATIENT
Start: 2021-09-30 | End: 2021-10-15 | Stop reason: SDUPTHER

## 2021-10-01 ENCOUNTER — TELEPHONE (OUTPATIENT)
Dept: FAMILY MEDICINE CLINIC | Facility: CLINIC | Age: 61
End: 2021-10-01

## 2021-10-15 ENCOUNTER — OFFICE VISIT (OUTPATIENT)
Dept: FAMILY MEDICINE CLINIC | Facility: CLINIC | Age: 61
End: 2021-10-15

## 2021-10-15 VITALS
OXYGEN SATURATION: 96 % | HEIGHT: 67 IN | TEMPERATURE: 97.3 F | HEART RATE: 76 BPM | WEIGHT: 184.2 LBS | DIASTOLIC BLOOD PRESSURE: 76 MMHG | BODY MASS INDEX: 28.91 KG/M2 | SYSTOLIC BLOOD PRESSURE: 138 MMHG

## 2021-10-15 DIAGNOSIS — K40.90 INGUINAL HERNIA OF RIGHT SIDE WITHOUT OBSTRUCTION OR GANGRENE: Primary | ICD-10-CM

## 2021-10-15 DIAGNOSIS — K27.9 PUD (PEPTIC ULCER DISEASE): ICD-10-CM

## 2021-10-15 DIAGNOSIS — K25.9 GASTRIC ULCER WITHOUT HEMORRHAGE OR PERFORATION, UNSPECIFIED CHRONICITY: ICD-10-CM

## 2021-10-15 PROCEDURE — 99213 OFFICE O/P EST LOW 20 MIN: CPT | Performed by: STUDENT IN AN ORGANIZED HEALTH CARE EDUCATION/TRAINING PROGRAM

## 2021-10-15 RX ORDER — PANTOPRAZOLE SODIUM 40 MG/1
40 TABLET, DELAYED RELEASE ORAL 2 TIMES DAILY
Qty: 60 TABLET | Refills: 3 | Status: SHIPPED | OUTPATIENT
Start: 2021-10-15

## 2021-11-01 NOTE — PROGRESS NOTES
I have spoken with the patient .   I have reviewed the notes, assessments, and/or procedures performed by Dr. Rene Yee, I concur with his  documentation and assessment and plan for Germain Masterson.          This document has been electronically signed by Andry Aquino MD on November 1, 2021 15:44 CDT
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
## 2021-11-12 ENCOUNTER — OFFICE VISIT (OUTPATIENT)
Dept: SURGERY | Facility: CLINIC | Age: 61
End: 2021-11-12

## 2021-11-12 VITALS
HEART RATE: 89 BPM | HEIGHT: 67 IN | SYSTOLIC BLOOD PRESSURE: 144 MMHG | WEIGHT: 182.4 LBS | DIASTOLIC BLOOD PRESSURE: 82 MMHG | BODY MASS INDEX: 28.63 KG/M2 | TEMPERATURE: 98.6 F

## 2021-11-12 DIAGNOSIS — K40.90 NON-RECURRENT UNILATERAL INGUINAL HERNIA WITHOUT OBSTRUCTION OR GANGRENE: Primary | ICD-10-CM

## 2021-11-12 PROCEDURE — 99214 OFFICE O/P EST MOD 30 MIN: CPT | Performed by: SURGERY

## 2021-11-12 NOTE — PATIENT INSTRUCTIONS
Smoking Tobacco Information, Adult  Smoking tobacco can be harmful to your health. Tobacco contains a poisonous (toxic), colorless chemical called nicotine. Nicotine is addictive. It changes the brain and can make it hard to stop smoking. Tobacco also has other toxic chemicals that can hurt your body and raise your risk of many cancers.  How can smoking tobacco affect me?  Smoking tobacco puts you at risk for:  · Cancer. Smoking is most commonly associated with lung cancer, but can also lead to cancer in other parts of the body.  · Chronic obstructive pulmonary disease (COPD). This is a long-term lung condition that makes it hard to breathe. It also gets worse over time.  · High blood pressure (hypertension), heart disease, stroke, or heart attack.  · Lung infections, such as pneumonia.  · Cataracts. This is when the lenses in the eyes become clouded.  · Digestive problems. This may include peptic ulcers, heartburn, and gastroesophageal reflux disease (GERD).  · Oral health problems, such as gum disease and tooth loss.  · Loss of taste and smell.  Smoking can affect your appearance by causing:  · Wrinkles.  · Yellow or stained teeth, fingers, and fingernails.  Smoking tobacco can also affect your social life, because:  · It may be challenging to find places to smoke when away from home. Many workplaces, restaurants, hotels, and public places are tobacco-free.  · Smoking is expensive. This is due to the cost of tobacco and the long-term costs of treating health problems from smoking.  · Secondhand smoke may affect those around you. Secondhand smoke can cause lung cancer, breathing problems, and heart disease. Children of smokers have a higher risk for:  ? Sudden infant death syndrome (SIDS).  ? Ear infections.  ? Lung infections.  If you currently smoke tobacco, quitting now can help you:  · Lead a longer and healthier life.  · Look, smell, breathe, and feel better over time.  · Save money.  · Protect others from the  harms of secondhand smoke.  What actions can I take to prevent health problems?  Quit smoking    · Do not start smoking. Quit if you already do.  · Make a plan to quit smoking and commit to it. Look for programs to help you and ask your health care provider for recommendations and ideas.  · Set a date and write down all the reasons you want to quit.  · Let your friends and family know you are quitting so they can help and support you. Consider finding friends who also want to quit. It can be easier to quit with someone else, so that you can support each other.  · Talk with your health care provider about using nicotine replacement medicines to help you quit, such as gum, lozenges, patches, sprays, or pills.  · Do not replace cigarette smoking with electronic cigarettes, which are commonly called e-cigarettes. The safety of e-cigarettes is not known, and some may contain harmful chemicals.  · If you try to quit but return to smoking, stay positive. It is common to slip up when you first quit, so take it one day at a time.  · Be prepared for cravings. When you feel the urge to smoke, chew gum or suck on hard candy.    Lifestyle  · Stay busy and take care of your body.  · Drink enough fluid to keep your urine pale yellow.  · Get plenty of exercise and eat a healthy diet. This can help prevent weight gain after quitting.  · Monitor your eating habits. Quitting smoking can cause you to have a larger appetite than when you smoke.  · Find ways to relax. Go out with friends or family to a movie or a restaurant where people do not smoke.  · Ask your health care provider about having regular tests (screenings) to check for cancer. This may include blood tests, imaging tests, and other tests.  · Find ways to manage your stress, such as meditation, yoga, or exercise.  Where to find support  To get support to quit smoking, consider:  · Asking your health care provider for more information and resources.  · Taking classes to  learn more about quitting smoking.  · Looking for local organizations that offer resources about quitting smoking.  · Joining a support group for people who want to quit smoking in your local community.  · Calling the smokefree.gov counselor helpline: 1-800-Quit-Now (1-266.273.7708)  Where to find more information  You may find more information about quitting smoking from:  · HelpGuide.org: www.helpguide.org  · Smokefree.gov: smokefree.gov  · American Lung Association: www.lung.org  Contact a health care provider if you:  · Have problems breathing.  · Notice that your lips, nose, or fingers turn blue.  · Have chest pain.  · Are coughing up blood.  · Feel faint or you pass out.  · Have other health changes that cause you to worry.  Summary  · Smoking tobacco can negatively affect your health, the health of those around you, your finances, and your social life.  · Do not start smoking. Quit if you already do. If you need help quitting, ask your health care provider.  · Think about joining a support group for people who want to quit smoking in your local community. There are many effective programs that will help you to quit this behavior.  This information is not intended to replace advice given to you by your health care provider. Make sure you discuss any questions you have with your health care provider.  Document Revised: 09/11/2020 Document Reviewed: 01/02/2018  [employer] Patient Education © 2021 [employer]  BMI for Adults  What is BMI?  Body mass index (BMI) is a number that is calculated from a person's weight and height. BMI can help estimate how much of a person's weight is composed of fat. BMI does not measure body fat directly. Rather, it is an alternative to procedures that directly measure body fat, which can be difficult and expensive.  BMI can help identify people who may be at higher risk for certain medical problems.  What are BMI measurements used for?  BMI is used as a screening tool to identify  "possible weight problems. It helps determine whether a person is obese, overweight, a healthy weight, or underweight.  BMI is useful for:  · Identifying a weight problem that may be related to a medical condition or may increase the risk for medical problems.  · Promoting changes, such as changes in diet and exercise, to help reach a healthy weight. BMI screening can be repeated to see if these changes are working.  How is BMI calculated?  BMI involves measuring your weight in relation to your height. Both height and weight are measured, and the BMI is calculated from those numbers. This can be done either in English (U.S[place] or metric measurements. Note that charts and online BMI calculators are available to help you find your BMI quickly and easily without having to do these calculations yourself.  To calculate your BMI in English (U.S.) measurements:    1. Measure your weight in pounds (lb).  2. Multiply the number of pounds by 703.  ? For example, for a person who weighs 180 lb, multiply that number by 703, which equals 126,540.  3. Measure your height in inches. Then multiply that number by itself to get a measurement called \"inches squared.\"  ? For example, for a person who is 70 inches tall, the \"inches squared\" measurement is 70 inches x 70 inches, which equals 4,900 inches squared.  4. Divide the total from step 2 (number of lb x 703) by the total from step 3 (inches squared): 126,540 ÷ 4,900 = 25.8. This is your BMI.    To calculate your BMI in metric measurements:  1. Measure your weight in kilograms (kg).  2. Measure your height in meters (m). Then multiply that number by itself to get a measurement called \"meters squared.\"  ? For example, for a person who is 1.75 m tall, the \"meters squared\" measurement is 1.75 m x 1.75 m, which is equal to 3.1 meters squared.  3. Divide the number of kilograms (your weight) by the meters squared number. In this example: 70 ÷ 3.1 = 22.6. This is your BMI.  What do the " results mean?  BMI charts are used to identify whether you are underweight, normal weight, overweight, or obese. The following guidelines will be used:  · Underweight: BMI less than 18.5.  · Normal weight: BMI between 18.5 and 24.9.  · Overweight: BMI between 25 and 29.9.  · Obese: BMI of 30 or above.  Keep these notes in mind:  · Weight includes both fat and muscle, so someone with a muscular build, such as an athlete, may have a BMI that is higher than 24.9. In cases like these, BMI is not an accurate measure of body fat.  · To determine if excess body fat is the cause of a BMI of 25 or higher, further assessments may need to be done by a health care provider.  · BMI is usually interpreted in the same way for men and women.  Where to find more information  For more information about BMI, including tools to quickly calculate your BMI, go to these websites:  · Centers for Disease Control and Prevention: www.cdc.gov  · American Heart Association: www.heart.org  · National Heart, Lung, and Blood [place]: www.nhlbi.nih.gov  Summary  · Body mass index (BMI) is a number that is calculated from a person's weight and height.  · BMI may help estimate how much of a person's weight is composed of fat. BMI can help identify those who may be at higher risk for certain medical problems.  · BMI can be measured using English measurements or metric measurements.  · BMI charts are used to identify whether you are underweight, normal weight, overweight, or obese.  This information is not intended to replace advice given to you by your health care provider. Make sure you discuss any questions you have with your health care provider.  Document Revised: 09/09/2020 Document Reviewed: 07/17/2020  [employer] Patient Education © 2021 [employer]

## 2021-11-12 NOTE — PROGRESS NOTES
60-year-old male referred for symptomatic right inguinal hernia.  This patient's had this for years.  No GI no  complaints.  Patient said he had trauma many years ago and thought it was related to this.  He had a recent CT scan done last year and the hernia is clearly present there but they did not comment on in the report.  No prior surgery in the lower abdomen or pelvis.  Patient does continue to smoke.    Vitals:    11/12/21 1310   BP: 144/82   Pulse: 89   Temp: 98.6 °F (37 °C)       Allergies: No Known Allergies    Home Medications:  Prior to Admission medications    Medication Sig Start Date End Date Taking? Authorizing Provider   Acetaminophen (TYLENOL ARTHRITIS PAIN PO) Take 1 tablet by mouth Daily.   Yes Provider, MD Rosemarie   pantoprazole (PROTONIX) 40 MG EC tablet Take 1 tablet by mouth 2 (Two) Times a Day. 10/15/21  Yes Andry Aquino MD       Social History     Socioeconomic History   • Marital status: Single   Tobacco Use   • Smoking status: Current Every Day Smoker     Packs/day: 0.50     Years: 45.00     Pack years: 22.50     Types: Cigars   • Smokeless tobacco: Never Used   • Tobacco comment: Used to smoke cigarettes for 15 years 1.5ppd   Substance and Sexual Activity   • Alcohol use: No   • Drug use: No   • Sexual activity: Yes     Partners: Female     Birth control/protection: None       Past Medical History:   Diagnosis Date   • Hypertension        Family History   Problem Relation Age of Onset   • Hypertension Mother    • Cancer Mother    • Cancer Father        Past Surgical History:   Procedure Laterality Date   • CYSTECTOMY      back   • ENDOSCOPY N/A 10/2/2020    Procedure: ESOPHAGOGASTRODUODENOSCOPY;  Surgeon: Jovan Powell MD;  Location: [facility] ENDOSCOPY;  Service: Gastroenterology;  Laterality: N/A;     Review of systems  Denies chest pain  Denies shortness of breath  Does not take any anticoagulants  Denies abdominal pain  Denies any urinary complaints        Alert  appropriate  Nontoxic  Nonicteric  Lungs clear  Heart regular rate and rhythm  Abdomen soft  Large right inguinal hernia extending down into the right hemiscrotum that is reducible.  No evidence of a left inguinal hernia or femoral hernia on either side.  Testicles in the bilateral without any obvious masses  Moves all extremities  Neurovascular grossly intact    Assessment and plan  Right inguinal hernia likely complete right inguinal hernia.  Able to be reduced.  Patient does not wish to have this repaired until after the first of the year.  He return next month to be set up for surgery or return sooner if he has any other concerns or questions

## 2021-12-11 ENCOUNTER — APPOINTMENT (OUTPATIENT)
Dept: CT IMAGING | Facility: HOSPITAL | Age: 61
End: 2021-12-11

## 2021-12-11 ENCOUNTER — HOSPITAL ENCOUNTER (EMERGENCY)
Facility: HOSPITAL | Age: 61
Discharge: HOME | End: 2021-12-11
Attending: FAMILY MEDICINE | Admitting: FAMILY MEDICINE

## 2021-12-11 VITALS
TEMPERATURE: 97.5 F | HEART RATE: 85 BPM | OXYGEN SATURATION: 98 % | BODY MASS INDEX: 28.25 KG/M2 | DIASTOLIC BLOOD PRESSURE: 100 MMHG | SYSTOLIC BLOOD PRESSURE: 165 MMHG | WEIGHT: 180 LBS | RESPIRATION RATE: 18 BRPM | HEIGHT: 67 IN

## 2021-12-11 DIAGNOSIS — S16.1XXA STRAIN OF NECK MUSCLE, INITIAL ENCOUNTER: Primary | ICD-10-CM

## 2021-12-11 PROCEDURE — 72125 CT NECK SPINE W/O DYE: CPT

## 2021-12-11 PROCEDURE — 96372 THER/PROPH/DIAG INJ SC/IM: CPT

## 2021-12-11 PROCEDURE — 25010000002 KETOROLAC TROMETHAMINE PER 15 MG: Performed by: NURSE PRACTITIONER

## 2021-12-11 PROCEDURE — 25010000002 ORPHENADRINE CITRATE PER 60 MG: Performed by: NURSE PRACTITIONER

## 2021-12-11 PROCEDURE — 99283 EMERGENCY DEPT VISIT LOW MDM: CPT

## 2021-12-11 RX ORDER — METHYLPREDNISOLONE 4 MG/1
TABLET ORAL
Qty: 21 TABLET | Refills: 0 | Status: SHIPPED | OUTPATIENT
Start: 2021-12-11 | End: 2022-05-27

## 2021-12-11 RX ORDER — KETOROLAC TROMETHAMINE 30 MG/ML
30 INJECTION, SOLUTION INTRAMUSCULAR; INTRAVENOUS ONCE
Status: COMPLETED | OUTPATIENT
Start: 2021-12-11 | End: 2021-12-11

## 2021-12-11 RX ORDER — HYDROCODONE BITARTRATE AND ACETAMINOPHEN 7.5; 325 MG/1; MG/1
1 TABLET ORAL ONCE
Status: COMPLETED | OUTPATIENT
Start: 2021-12-11 | End: 2021-12-11

## 2021-12-11 RX ORDER — CYCLOBENZAPRINE HCL 10 MG
10 TABLET ORAL 3 TIMES DAILY PRN
Qty: 15 TABLET | Refills: 0 | Status: SHIPPED | OUTPATIENT
Start: 2021-12-11 | End: 2021-12-16

## 2021-12-11 RX ORDER — ORPHENADRINE CITRATE 30 MG/ML
60 INJECTION INTRAMUSCULAR; INTRAVENOUS ONCE
Status: COMPLETED | OUTPATIENT
Start: 2021-12-11 | End: 2021-12-11

## 2021-12-11 RX ORDER — CYCLOBENZAPRINE HCL 10 MG
10 TABLET ORAL ONCE
Status: COMPLETED | OUTPATIENT
Start: 2021-12-11 | End: 2021-12-11

## 2021-12-11 RX ADMIN — HYDROCODONE BITARTRATE AND ACETAMINOPHEN 1 TABLET: 7.5; 325 TABLET ORAL at 19:20

## 2021-12-11 RX ADMIN — CYCLOBENZAPRINE HYDROCHLORIDE 10 MG: 10 TABLET, FILM COATED ORAL at 19:20

## 2021-12-11 RX ADMIN — ORPHENADRINE CITRATE 60 MG: 60 INJECTION INTRAMUSCULAR; INTRAVENOUS at 17:27

## 2021-12-11 RX ADMIN — KETOROLAC TROMETHAMINE 30 MG: 30 INJECTION, SOLUTION INTRAMUSCULAR; INTRAVENOUS at 17:26

## 2021-12-11 NOTE — ED NOTES
"Patient c/o left ear pain that \"makes my whole body hurt.\" States \"I am a  and I may have gotten some air in it.\"     Abby Chamberlain RN  12/11/21 7099    "

## 2021-12-12 NOTE — ED PROVIDER NOTES
Subjective   61-year-old male in the emergency department complaining of left-sided neck pain.  Just posterior superior aspect left ear there is pain that is reproducible with palpation and lateral rotation of the cervical spine.  Likely a muscle strain in general.  Patient reports that he woke up this way.  No obvious deformity noted no carotid bruit appreciated.  ENT exam normal      History provided by:  Patient   used: No        Review of Systems   Constitutional: Negative for chills.   HENT: Negative for congestion.    Respiratory: Negative for shortness of breath.    Cardiovascular: Negative for chest pain and palpitations.   Gastrointestinal: Negative for abdominal pain, diarrhea, nausea and vomiting.   Genitourinary: Negative for flank pain.   Musculoskeletal: Positive for arthralgias, neck pain and neck stiffness. Negative for back pain.   Skin: Negative for wound.   Allergic/Immunologic: Negative for immunocompromised state.   Neurological: Negative for weakness.   Hematological: Negative for adenopathy.   Psychiatric/Behavioral: Negative for confusion.   All other systems reviewed and are negative.      Past Medical History:   Diagnosis Date   • Hypertension        No Known Allergies    Past Surgical History:   Procedure Laterality Date   • CYSTECTOMY      back   • ENDOSCOPY N/A 10/2/2020    Procedure: ESOPHAGOGASTRODUODENOSCOPY;  Surgeon: Jovan Powell MD;  Location: [facility] ENDOSCOPY;  Service: Gastroenterology;  Laterality: N/A;       Family History   Problem Relation Age of Onset   • Hypertension Mother    • Cancer Mother    • Cancer Father        Social History     Socioeconomic History   • Marital status: Single   Tobacco Use   • Smoking status: Current Every Day Smoker     Packs/day: 0.50     Years: 45.00     Pack years: 22.50     Types: Cigars   • Smokeless tobacco: Never Used   • Tobacco comment: Used to smoke cigarettes for 15 years 1.5ppd   Substance and Sexual Activity    • Alcohol use: No   • Drug use: No   • Sexual activity: Yes     Partners: Female     Birth control/protection: None           Objective   Physical Exam  Vitals and nursing note reviewed.   Constitutional:       Appearance: He is well-developed.   HENT:      Head: Normocephalic.      Nose: Nose normal.   Eyes:      Conjunctiva/sclera: Conjunctivae normal.      Pupils: Pupils are equal, round, and reactive to light.   Neck:     Cardiovascular:      Rate and Rhythm: Normal rate and regular rhythm.      Heart sounds: Normal heart sounds.   Pulmonary:      Effort: Pulmonary effort is normal.      Breath sounds: Normal breath sounds.   Abdominal:      Palpations: Abdomen is soft.   Musculoskeletal:      Cervical back: No signs of trauma. Pain with movement and muscular tenderness present. No spinous process tenderness. Decreased range of motion.   Skin:     General: Skin is warm and dry.   Neurological:      Mental Status: He is alert and oriented to person, place, and time.      GCS: GCS eye subscore is 4. GCS verbal subscore is 5. GCS motor subscore is 6.         Procedures           ED Course            CT Cervical Spine Without Contrast   Final Result   CONCLUSION:   Straightening of the cervical lordosis.    Multilevel degenerative disc disease and spondylotic change, most   pronounced at C5-6 and C6-7, with some bony encroachment of the   neural foramina for the C6 and C7 nerve roots.      [id number]      Electronically signed by:  Kumar Crawford MD  12/11/2021 6:04 PM   [facility] Workstation: [id number]                                                [facility]  Number of Diagnoses or Management Options  Strain of neck muscle, initial encounter: new and requires workup  Diagnosis management comments: 61-year-old male in the emergency department, cervical strain most likely.  Norflex IM in the emergency department patient reports that he does have some minimal relief since.  ENT exam normal, cervical spine shows spondylitic changes.  No  carotid bruit appreciated.  Patient strict return emergency department if symptoms worsen       Amount and/or Complexity of Data Reviewed  Tests in the radiology section of CPT®: ordered and reviewed    Risk of Complications, Morbidity, and/or Mortality  Presenting problems: moderate  Diagnostic procedures: moderate  Management options: moderate    Patient Progress  Patient progress: stable      Final diagnoses:   Strain of neck muscle, initial encounter       ED Disposition  ED Disposition     ED Disposition Condition Comment    Discharge Stable           Ashlyn Lira MD  [facility] CL[facility] [address] [phone]    Call in 3 days           Medication List      New Prescriptions    cyclobenzaprine 10 MG tablet  Commonly known as: FLEXERIL  Take 1 tablet by mouth 3 (Three) Times a Day As Needed for Muscle Spasms for up to 5 days.     methylPREDNISolone 4 MG dose pack  Commonly known as: MEDROL  Take as directed on package instructions.           Where to Get Your Medications      These medications were sent to [id number] - [place] [address] [facility] AT [facility] - [phone]  - [phone]   [address] [address]    Phone: [phone]   · cyclobenzaprine 10 MG tablet  · methylPREDNISolone 4 MG dose pack          Christiano Morales, APRN  12/11/21 4572

## 2021-12-13 ENCOUNTER — TELEPHONE (OUTPATIENT)
Dept: FAMILY MEDICINE CLINIC | Facility: CLINIC | Age: 61
End: 2021-12-13

## 2022-04-13 ENCOUNTER — OFFICE VISIT (OUTPATIENT)
Dept: SURGERY | Facility: CLINIC | Age: 62
End: 2022-04-13

## 2022-04-13 VITALS
HEIGHT: 67 IN | BODY MASS INDEX: 28.35 KG/M2 | SYSTOLIC BLOOD PRESSURE: 134 MMHG | TEMPERATURE: 98 F | WEIGHT: 180.6 LBS | HEART RATE: 88 BPM | DIASTOLIC BLOOD PRESSURE: 82 MMHG

## 2022-04-13 DIAGNOSIS — K40.90 NON-RECURRENT UNILATERAL INGUINAL HERNIA WITHOUT OBSTRUCTION OR GANGRENE: Primary | ICD-10-CM

## 2022-04-13 PROCEDURE — 99213 OFFICE O/P EST LOW 20 MIN: CPT | Performed by: SURGERY

## 2022-04-13 RX ORDER — SODIUM CHLORIDE 0.9 % (FLUSH) 0.9 %
10 SYRINGE (ML) INJECTION EVERY 12 HOURS SCHEDULED
Status: CANCELLED | OUTPATIENT
Start: 2022-04-19

## 2022-04-13 RX ORDER — SODIUM CHLORIDE 0.9 % (FLUSH) 0.9 %
10 SYRINGE (ML) INJECTION AS NEEDED
Status: CANCELLED | OUTPATIENT
Start: 2022-04-19

## 2022-04-13 NOTE — PROGRESS NOTES
61-year-old gentleman returns to be set up for surgery for right inguinal hernia repair.  Patient had a known right inguinal hernia for at least 4 to 5 years.  Seems of gotten progressively larger.  Recently was doing some moving and noticed that his hernia was causing more problems.  No GI no  symptoms.  No prior surgery in this area.      Vitals:    04/13/22 1353   BP: 134/82   Pulse: 88   Temp: 98 °F (36.7 °C)       Allergies: No Known Allergies    Home Medications:  Prior to Admission medications    Medication Sig Start Date End Date Taking? Authorizing Provider   Acetaminophen (TYLENOL ARTHRITIS PAIN PO) Take 1 tablet by mouth Daily.   Yes Provider, MD Rosemarie   pantoprazole (PROTONIX) 40 MG EC tablet Take 1 tablet by mouth 2 (Two) Times a Day. 10/15/21  Yes Andry Aquino MD   methylPREDNISolone (MEDROL) 4 MG dose pack Take as directed on package instructions. 12/11/21   Christiano Morales APRN       Social History     Socioeconomic History   • Marital status: Single   Tobacco Use   • Smoking status: Current Every Day Smoker     Packs/day: 0.50     Years: 45.00     Pack years: 22.50     Types: Cigars   • Smokeless tobacco: Never Used   • Tobacco comment: Used to smoke cigarettes for 15 years 1.5ppd   Substance and Sexual Activity   • Alcohol use: No   • Drug use: No   • Sexual activity: Yes     Partners: Female     Birth control/protection: None       Past Medical History:   Diagnosis Date   • Hypertension        Family History   Problem Relation Age of Onset   • Hypertension Mother    • Cancer Mother    • Cancer Father        Past Surgical History:   Procedure Laterality Date   • CYSTECTOMY      back   • ENDOSCOPY N/A 10/2/2020    Procedure: ESOPHAGOGASTRODUODENOSCOPY;  Surgeon: Jovan Powell MD;  Location: [facility] ENDOSCOPY;  Service: Gastroenterology;  Laterality: N/A;     Review of systems  Denies shortness of breath  Denies chest pain  Denies any abdominal complaints  Does not take any  anticoagulation  No urinary complaints        Alert and appropriate  Nontoxic  Abdomen soft  Examined standing and supine and he has a reducible complete right inguinal hernia.  No evidence of a left inguinal hernia no evidence of femoral hernia on either side.  Both testicles are descended without any palpable masses  Heart regular rate and rhythm  Lungs are clear  Extremities unremarkable      Assessment and plan  Large and/or complete right inguinal hernia.  Recommend open repair likely with use of mesh.  Fully discussed the procedure alternatives risk benefits with the patient he clearly understands and wishes to proceed

## 2022-05-08 ENCOUNTER — APPOINTMENT (OUTPATIENT)
Dept: CT IMAGING | Facility: HOSPITAL | Age: 62
End: 2022-05-08

## 2022-05-08 ENCOUNTER — HOSPITAL ENCOUNTER (EMERGENCY)
Facility: HOSPITAL | Age: 62
Discharge: HOME | End: 2022-05-08
Attending: EMERGENCY MEDICINE | Admitting: EMERGENCY MEDICINE

## 2022-05-08 VITALS
WEIGHT: 180 LBS | BODY MASS INDEX: 28.25 KG/M2 | TEMPERATURE: 98 F | HEIGHT: 67 IN | HEART RATE: 83 BPM | DIASTOLIC BLOOD PRESSURE: 83 MMHG | SYSTOLIC BLOOD PRESSURE: 133 MMHG | RESPIRATION RATE: 18 BRPM | OXYGEN SATURATION: 97 %

## 2022-05-08 DIAGNOSIS — K76.0 FATTY LIVER: ICD-10-CM

## 2022-05-08 DIAGNOSIS — K40.90 UNILATERAL INGUINAL HERNIA WITHOUT OBSTRUCTION OR GANGRENE, RECURRENCE NOT SPECIFIED: Primary | ICD-10-CM

## 2022-05-08 LAB
ALBUMIN SERPL-MCNC: 4.3 G/DL (ref 3.5–5.2)
ALBUMIN/GLOB SERPL: 1.3 G/DL
ALP SERPL-CCNC: 93 U/L (ref 39–117)
ALT SERPL W P-5'-P-CCNC: 14 U/L (ref 1–41)
ANION GAP SERPL CALCULATED.3IONS-SCNC: 11 MMOL/L (ref 5–15)
AST SERPL-CCNC: 13 U/L (ref 1–40)
BASOPHILS # BLD AUTO: 0.07 10*3/MM3 (ref 0–0.2)
BASOPHILS NFR BLD AUTO: 0.7 % (ref 0–1.5)
BILIRUB SERPL-MCNC: 0.3 MG/DL (ref 0–1.2)
BILIRUB UR QL STRIP: NEGATIVE
BUN SERPL-MCNC: 22 MG/DL (ref 8–23)
BUN/CREAT SERPL: 21 (ref 7–25)
CALCIUM SPEC-SCNC: 9.7 MG/DL (ref 8.6–10.5)
CHLORIDE SERPL-SCNC: 105 MMOL/L (ref 98–107)
CLARITY UR: CLEAR
CO2 SERPL-SCNC: 25 MMOL/L (ref 22–29)
COLOR UR: YELLOW
CREAT SERPL-MCNC: 1.05 MG/DL (ref 0.76–1.27)
DEPRECATED RDW RBC AUTO: 42.5 FL (ref 37–54)
EGFRCR SERPLBLD CKD-EPI 2021: 80.8 ML/MIN/1.73
EOSINOPHIL # BLD AUTO: 0.15 10*3/MM3 (ref 0–0.4)
EOSINOPHIL NFR BLD AUTO: 1.6 % (ref 0.3–6.2)
ERYTHROCYTE [DISTWIDTH] IN BLOOD BY AUTOMATED COUNT: 12.6 % (ref 12.3–15.4)
GLOBULIN UR ELPH-MCNC: 3.4 GM/DL
GLUCOSE SERPL-MCNC: 86 MG/DL (ref 65–99)
GLUCOSE UR STRIP-MCNC: NEGATIVE MG/DL
HCT VFR BLD AUTO: 47.2 % (ref 37.5–51)
HGB BLD-MCNC: 16.2 G/DL (ref 13–17.7)
HGB UR QL STRIP.AUTO: NEGATIVE
HOLD SPECIMEN: NORMAL
HOLD SPECIMEN: NORMAL
IMM GRANULOCYTES # BLD AUTO: 0.04 10*3/MM3 (ref 0–0.05)
IMM GRANULOCYTES NFR BLD AUTO: 0.4 % (ref 0–0.5)
KETONES UR QL STRIP: ABNORMAL
LEUKOCYTE ESTERASE UR QL STRIP.AUTO: NEGATIVE
LIPASE SERPL-CCNC: 28 U/L (ref 13–60)
LYMPHOCYTES # BLD AUTO: 2.06 10*3/MM3 (ref 0.7–3.1)
LYMPHOCYTES NFR BLD AUTO: 22 % (ref 19.6–45.3)
MCH RBC QN AUTO: 31.7 PG (ref 26.6–33)
MCHC RBC AUTO-ENTMCNC: 34.3 G/DL (ref 31.5–35.7)
MCV RBC AUTO: 92.4 FL (ref 79–97)
MONOCYTES # BLD AUTO: 0.85 10*3/MM3 (ref 0.1–0.9)
MONOCYTES NFR BLD AUTO: 9.1 % (ref 5–12)
NEUTROPHILS NFR BLD AUTO: 6.18 10*3/MM3 (ref 1.7–7)
NEUTROPHILS NFR BLD AUTO: 66.2 % (ref 42.7–76)
NITRITE UR QL STRIP: NEGATIVE
NRBC BLD AUTO-RTO: 0 /100 WBC (ref 0–0.2)
PH UR STRIP.AUTO: 5.5 [PH] (ref 5–9)
PLATELET # BLD AUTO: 260 10*3/MM3 (ref 140–450)
PMV BLD AUTO: 9.6 FL (ref 6–12)
POTASSIUM SERPL-SCNC: 4.4 MMOL/L (ref 3.5–5.2)
PROT SERPL-MCNC: 7.7 G/DL (ref 6–8.5)
PROT UR QL STRIP: NEGATIVE
RBC # BLD AUTO: 5.11 10*6/MM3 (ref 4.14–5.8)
SODIUM SERPL-SCNC: 141 MMOL/L (ref 136–145)
SP GR UR STRIP: 1.03 (ref 1–1.03)
UROBILINOGEN UR QL STRIP: ABNORMAL
WBC NRBC COR # BLD: 9.35 10*3/MM3 (ref 3.4–10.8)
WHOLE BLOOD HOLD SPECIMEN: NORMAL

## 2022-05-08 PROCEDURE — 0 DIATRIZOATE MEGLUMINE & SODIUM PER 1 ML: Performed by: EMERGENCY MEDICINE

## 2022-05-08 PROCEDURE — 25010000002 ONDANSETRON PER 1 MG: Performed by: EMERGENCY MEDICINE

## 2022-05-08 PROCEDURE — 96361 HYDRATE IV INFUSION ADD-ON: CPT

## 2022-05-08 PROCEDURE — 81003 URINALYSIS AUTO W/O SCOPE: CPT | Performed by: EMERGENCY MEDICINE

## 2022-05-08 PROCEDURE — 25010000002 IOPAMIDOL 61 % SOLUTION: Performed by: EMERGENCY MEDICINE

## 2022-05-08 PROCEDURE — 80053 COMPREHEN METABOLIC PANEL: CPT | Performed by: EMERGENCY MEDICINE

## 2022-05-08 PROCEDURE — 85025 COMPLETE CBC W/AUTO DIFF WBC: CPT | Performed by: EMERGENCY MEDICINE

## 2022-05-08 PROCEDURE — 99283 EMERGENCY DEPT VISIT LOW MDM: CPT

## 2022-05-08 PROCEDURE — 96374 THER/PROPH/DIAG INJ IV PUSH: CPT

## 2022-05-08 PROCEDURE — 83690 ASSAY OF LIPASE: CPT | Performed by: EMERGENCY MEDICINE

## 2022-05-08 PROCEDURE — 74177 CT ABD & PELVIS W/CONTRAST: CPT

## 2022-05-08 RX ORDER — SODIUM CHLORIDE 9 MG/ML
125 INJECTION, SOLUTION INTRAVENOUS CONTINUOUS
Status: DISCONTINUED | OUTPATIENT
Start: 2022-05-08 | End: 2022-05-08 | Stop reason: HOSPADM

## 2022-05-08 RX ORDER — ONDANSETRON 2 MG/ML
4 INJECTION INTRAMUSCULAR; INTRAVENOUS ONCE
Status: COMPLETED | OUTPATIENT
Start: 2022-05-08 | End: 2022-05-08

## 2022-05-08 RX ORDER — DOCUSATE SODIUM 100 MG/1
100 CAPSULE, LIQUID FILLED ORAL 2 TIMES DAILY
Qty: 30 CAPSULE | Refills: 0 | Status: SHIPPED | OUTPATIENT
Start: 2022-05-08

## 2022-05-08 RX ORDER — SODIUM CHLORIDE 0.9 % (FLUSH) 0.9 %
10 SYRINGE (ML) INJECTION AS NEEDED
Status: DISCONTINUED | OUTPATIENT
Start: 2022-05-08 | End: 2022-05-08 | Stop reason: HOSPADM

## 2022-05-08 RX ORDER — ONDANSETRON 4 MG/1
4 TABLET, ORALLY DISINTEGRATING ORAL EVERY 8 HOURS PRN
Qty: 10 TABLET | Refills: 0 | Status: SHIPPED | OUTPATIENT
Start: 2022-05-08

## 2022-05-08 RX ADMIN — DIATRIZOATE MEGLUMINE AND DIATRIZOATE SODIUM 30 ML: 660; 100 LIQUID ORAL; RECTAL at 16:28

## 2022-05-08 RX ADMIN — SODIUM CHLORIDE 125 ML/HR: 9 INJECTION, SOLUTION INTRAVENOUS at 14:47

## 2022-05-08 RX ADMIN — IOPAMIDOL 90 ML: 612 INJECTION, SOLUTION INTRAVENOUS at 16:27

## 2022-05-08 RX ADMIN — ONDANSETRON 4 MG: 2 INJECTION INTRAMUSCULAR; INTRAVENOUS at 14:47

## 2022-05-08 NOTE — ED PROVIDER NOTES
Subjective   62yo male pmh significant chronic right inguinal hernia, presents ED c/o 3-4d hx increased right groin/suprapubic abdominal pain associated nausea/vomiting/constipation, exacerbated ambulation/neg relieve factors.  ROS (+) dysuria.  Denies hematemesis/hematochoezia/melena/fever/chills/cough.      History provided by:  Patient  Abdominal Pain  Pain location:  Suprapubic  Pain quality: cramping    Pain radiates to:  Does not radiate  Pain severity:  Moderate  Duration:  3 days  Timing:  Intermittent  Chronicity:  New  Associated symptoms: constipation, dysuria, nausea and vomiting    Associated symptoms: no diarrhea        Review of Systems   Constitutional: Negative.    HENT: Negative.    Eyes: Negative for redness.   Respiratory: Negative.    Cardiovascular: Negative.    Gastrointestinal: Positive for abdominal pain, constipation, nausea and vomiting. Negative for diarrhea.   Genitourinary: Positive for dysuria.   Musculoskeletal: Negative.    Allergic/Immunologic: Negative for immunocompromised state.   All other systems reviewed and are negative.      Past Medical History:   Diagnosis Date   • Hypertension        No Known Allergies    Past Surgical History:   Procedure Laterality Date   • CYSTECTOMY      back   • ENDOSCOPY N/A 10/2/2020    Procedure: ESOPHAGOGASTRODUODENOSCOPY;  Surgeon: Jovan Powell MD;  Location: [facility] ENDOSCOPY;  Service: Gastroenterology;  Laterality: N/A;       Family History   Problem Relation Age of Onset   • Hypertension Mother    • Cancer Mother    • Cancer Father        Social History     Socioeconomic History   • Marital status: Single   Tobacco Use   • Smoking status: Current Every Day Smoker     Packs/day: 0.50     Years: 45.00     Pack years: 22.50     Types: Cigars   • Smokeless tobacco: Never Used   • Tobacco comment: Used to smoke cigarettes for 15 years 1.5ppd   Substance and Sexual Activity   • Alcohol use: No   • Drug use: No   • Sexual activity: Yes      Partners: Female     Birth control/protection: None           Objective   Physical Exam  Vitals and nursing note reviewed. Exam conducted with a chaperone present.   Constitutional:       Appearance: Normal appearance.   HENT:      Head: Normocephalic and atraumatic.      Mouth/Throat:      Mouth: Mucous membranes are moist.   Eyes:      Pupils: Pupils are equal, round, and reactive to light.   Cardiovascular:      Rate and Rhythm: Normal rate and regular rhythm.      Pulses: Normal pulses.      Heart sounds: Normal heart sounds. No murmur heard.    No friction rub. No gallop.   Pulmonary:      Effort: Pulmonary effort is normal. No respiratory distress.      Breath sounds: Normal breath sounds. No wheezing, rhonchi or rales.   Abdominal:      General: Abdomen is flat. Bowel sounds are normal.      Palpations: Abdomen is soft.      Tenderness: There is no abdominal tenderness. There is no right CVA tenderness, left CVA tenderness, guarding or rebound. Negative signs include Blandon's sign and McBurney's sign.      Hernia: A hernia is present. Hernia is present in the right inguinal area.   Genitourinary:      Musculoskeletal:      Cervical back: Normal range of motion and neck supple. No rigidity.      Right lower leg: No edema.      Left lower leg: No edema.   Lymphadenopathy:      Cervical: No cervical adenopathy.   Skin:     General: Skin is warm and dry.   Neurological:      General: No focal deficit present.      Mental Status: He is alert and oriented to person, place, and time.      GCS: GCS eye subscore is 4. GCS verbal subscore is 5. GCS motor subscore is 6.         Procedures           ED Course      Labs Reviewed   URINALYSIS W/ MICROSCOPIC IF INDICATED (NO CULTURE) - Abnormal; Notable for the following components:       Result Value    Ketones, UA Trace (*)     All other components within normal limits    Narrative:     Urine microscopic not indicated.   LIPASE - Normal   CBC WITH AUTO DIFFERENTIAL -  Normal   COMPREHENSIVE METABOLIC PANEL    Narrative:     GFR Normal >60  Chronic Kidney Disease <60  Kidney Failure <15     CBC AND DIFFERENTIAL    Narrative:     The following orders were created for panel order CBC & Differential.  Procedure                               Abnormality         Status                     ---------                               -----------         ------                     CBC Auto Differential[[id number]]        Normal              Final result                 Please view results for these tests on the individual orders.   GOLD TOP - SST   LIGHT BLUE TOP   EXTRA TUBES    Narrative:     The following orders were created for panel order Extra Tubes.  Procedure                               Abnormality         Status                     ---------                               -----------         ------                     Gold Top - SST[[id number]]                                   Final result               Brewer Top[[id number]]                                         In process                 Light Blue Top[[id number]]                                   Final result                 Please view results for these tests on the individual orders.   GRAY TOP     CT Abdomen Pelvis With Contrast    Result Date: 5/8/2022  Narrative: EXAM: CT ABDOMEN PELVIS WITH IV CONTRAST ORDERING PROVIDER: JOSEFA JHA CLINICAL HISTORY: Abdominal pain COMPARISON: 9/29/2020 TECHNIQUE: CT abdomen and pelvis performed with 90ml of Isovue 300 as IV contrast and with oral contrast, and reformatted in the sagittal and coronal planes. This examination was performed according to our departmental dose optimization program which includes automated exposure control, adjustment of the MA and kV according to patient size, and/or use of iterative reconstruction technique. FINDINGS: BASILAR CHEST: Mild streaky change in the lung bases posteriorly. No consolidation, nodule or effusion. LIVER: No mass, enlargement or abnormal  density. Diffuse fatty change of liver. BILIARY TRACT: Unremarkable gallbladder. SPLEEN: No mass or enlargement. PANCREAS: No mass or inflammatory process. Normal pancreatic duct. ADRENAL GLANDS: Unremarkable. No mass. URINARY SYSTEM: Kidneys are within normal in size. No stone, hydronephrosis, or mass. Normal ureters.  Bladder is normal without mass or stone.  GI TRACT: No mass, dilation, or wall thickening.  No diverticula.  No hernia.  Appendix is normal.  REPRODUCTIVE SYSTEM: Unremarkable PERITONEAL SPACE:No free air, free fluid, mass or adenopathy. RETROPERITONEAL SPACE:  No adenopathy, mass, aneurysm or significant vascular abnormality. BONES AND EXTRA-ABDOMINAL SOFT TISSUES: No aggressive osseous lesion.  No inguinal adenopathy but with right fat containing inguinal hernia extending to the right scrotal sac measuring 16.4 x 5.3 x 5.7 cm (craniocaudal by AP by transverse).     Impression: There is right fat containing inguinal hernia extending to the right scrotal sac measuring 16.4 x 5.3 x 5.7 cm (craniocaudal by AP by transverse). Diffuse fatty change of liver. Mild streaky change in the lung bases posteriorly. Appendix is normal. Electronically signed by:  Christian Powell MD  5/8/2022 5:12 PM CDT Workstation: [id number]                                               MDM  Number of Diagnoses or Management Options  Fatty liver  Unilateral inguinal hernia without obstruction or gangrene, recurrence not specified  Diagnosis management comments: Labs/radiographic studies reviewed. Afebrile/no leukocytosis/electrolytes normal.  CT abd/pelvis significant for large right fat containing inguinal hernia without evidence of bowel obstruction/inflammation, as well as incidental fatty liver.  Clinical exam significant for right inguinal hernia, reducible.  No evidence obstruction/hemorrhage/peritonitis. Stable dc with pmd/gastroenterology/general surgery followup.  Plan prn zofran 4mg odt/colace 100mg po bid.  DC precautions  provided.       Amount and/or Complexity of Data Reviewed  Clinical lab tests: reviewed  Tests in the radiology section of CPT®: reviewed        Final diagnoses:   Unilateral inguinal hernia without obstruction or gangrene, recurrence not specified   Fatty liver       ED Disposition  ED Disposition     ED Disposition   Discharge    Condition   Good    Comment   --             Rene Yee MD  [address]   [place] [id number] [phone]    In 1 day      Jones Parra MD  [address] [place] [address] [address]  [address]  [place] [address]  [phone]    Schedule an appointment as soon as possible for a visit in 1 week      Venu Arellano,   [address] [facility] [address]  [place] [address]  [phone]    Schedule an appointment as soon as possible for a visit            Medication List      New Prescriptions    docusate sodium 100 MG capsule  Commonly known as: COLACE  Take 1 capsule by mouth 2 (Two) Times a Day.     ondansetron ODT 4 MG disintegrating tablet  Commonly known as: ZOFRAN-ODT  Place 1 tablet on the tongue Every 8 (Eight) Hours As Needed for Nausea or Vomiting.           Where to Get Your Medications      These medications were sent to [id number] - [place] [id number] [facility] AT [facility] - [phone]  - [phone]   [address] [facility] [address]    Phone: [phone]   · docusate sodium 100 MG capsule  · ondansetron ODT 4 MG disintegrating tablet          Sabas Felder MD  05/08/22 9202

## 2022-05-08 NOTE — DISCHARGE INSTRUCTIONS
Return ED abdominal pain, groin pain, vomiting, dehydration, bleeding, worse condition, any other concerns

## 2022-05-09 ENCOUNTER — TELEPHONE (OUTPATIENT)
Dept: FAMILY MEDICINE CLINIC | Facility: CLINIC | Age: 62
End: 2022-05-09

## 2022-05-27 ENCOUNTER — OFFICE VISIT (OUTPATIENT)
Dept: SURGERY | Facility: CLINIC | Age: 62
End: 2022-05-27

## 2022-05-27 VITALS
BODY MASS INDEX: 28.88 KG/M2 | TEMPERATURE: 96.9 F | WEIGHT: 184 LBS | DIASTOLIC BLOOD PRESSURE: 80 MMHG | SYSTOLIC BLOOD PRESSURE: 126 MMHG | HEIGHT: 67 IN | HEART RATE: 84 BPM

## 2022-05-27 DIAGNOSIS — K40.90 NON-RECURRENT UNILATERAL INGUINAL HERNIA WITHOUT OBSTRUCTION OR GANGRENE: Primary | ICD-10-CM

## 2022-05-27 PROCEDURE — 99213 OFFICE O/P EST LOW 20 MIN: CPT | Performed by: SURGERY

## 2022-05-27 NOTE — PROGRESS NOTES
61-year-old gentleman who was set up for right inguinal hernia surgery few weeks ago but ended up canceling the surgery at the last minute.  He evidently had difficulty with his ride getting to the hospital.  He has been seen in the emergency room this past week and had a CT scan which demonstrated fat in his right inguinal hernia otherwise the study was unremarkable..  Patient wishes to go ahead and be set up for his right inguinal hernia repair now.  His exam is unchanged.  His right inguinal hernia does reduce.  Right testicle is without any mass.  Again went over with him and his wife about the surgery of right inguinal hernia repair and likely use of mesh.  They clearly understand the procedure alternatives risk benefits and wishes to proceed

## 2022-05-31 ENCOUNTER — PRE-ADMISSION TESTING (OUTPATIENT)
Dept: PREADMISSION TESTING | Facility: HOSPITAL | Age: 62
End: 2022-05-31

## 2022-05-31 VITALS
DIASTOLIC BLOOD PRESSURE: 86 MMHG | BODY MASS INDEX: 28.56 KG/M2 | HEART RATE: 96 BPM | WEIGHT: 182 LBS | OXYGEN SATURATION: 96 % | RESPIRATION RATE: 12 BRPM | HEIGHT: 67 IN | SYSTOLIC BLOOD PRESSURE: 140 MMHG

## 2022-05-31 LAB — MRSA DNA SPEC QL NAA+PROBE: POSITIVE

## 2022-05-31 PROCEDURE — 87641 MR-STAPH DNA AMP PROBE: CPT

## 2022-05-31 PROCEDURE — 93005 ELECTROCARDIOGRAM TRACING: CPT

## 2022-05-31 PROCEDURE — 93010 ELECTROCARDIOGRAM REPORT: CPT | Performed by: INTERNAL MEDICINE

## 2022-05-31 RX ORDER — SODIUM CHLORIDE, SODIUM GLUCONATE, SODIUM ACETATE, POTASSIUM CHLORIDE AND MAGNESIUM CHLORIDE 526; 502; 368; 37; 30 MG/100ML; MG/100ML; MG/100ML; MG/100ML; MG/100ML
1000 INJECTION, SOLUTION INTRAVENOUS CONTINUOUS PRN
Status: CANCELLED | OUTPATIENT
Start: 2022-06-02

## 2022-06-02 ENCOUNTER — HOSPITAL ENCOUNTER (OUTPATIENT)
Facility: HOSPITAL | Age: 62
Setting detail: HOSPITAL OUTPATIENT SURGERY
Discharge: HOME | End: 2022-06-02
Attending: SURGERY | Admitting: SURGERY

## 2022-06-02 ENCOUNTER — ANESTHESIA EVENT (OUTPATIENT)
Dept: PERIOP | Facility: HOSPITAL | Age: 62
End: 2022-06-02

## 2022-06-02 ENCOUNTER — ANESTHESIA (OUTPATIENT)
Dept: PERIOP | Facility: HOSPITAL | Age: 62
End: 2022-06-02

## 2022-06-02 VITALS
SYSTOLIC BLOOD PRESSURE: 125 MMHG | HEIGHT: 67 IN | OXYGEN SATURATION: 94 % | BODY MASS INDEX: 28.37 KG/M2 | HEART RATE: 78 BPM | DIASTOLIC BLOOD PRESSURE: 73 MMHG | WEIGHT: 180.78 LBS | RESPIRATION RATE: 18 BRPM | TEMPERATURE: 98.9 F

## 2022-06-02 DIAGNOSIS — K40.90 NON-RECURRENT UNILATERAL INGUINAL HERNIA WITHOUT OBSTRUCTION OR GANGRENE: ICD-10-CM

## 2022-06-02 PROCEDURE — 25010000002 FENTANYL CITRATE (PF) 50 MCG/ML SOLUTION: Performed by: NURSE ANESTHETIST, CERTIFIED REGISTERED

## 2022-06-02 PROCEDURE — 25010000002 MIDAZOLAM PER 1 MG: Performed by: NURSE ANESTHETIST, CERTIFIED REGISTERED

## 2022-06-02 PROCEDURE — 25010000002 HYDROMORPHONE 1 MG/ML SOLUTION: Performed by: NURSE ANESTHETIST, CERTIFIED REGISTERED

## 2022-06-02 PROCEDURE — 49505 PRP I/HERN INIT REDUC >5 YR: CPT | Performed by: SURGERY

## 2022-06-02 PROCEDURE — 25010000002 PHENYLEPHRINE 10 MG/ML SOLUTION: Performed by: NURSE ANESTHETIST, CERTIFIED REGISTERED

## 2022-06-02 PROCEDURE — 25010000002 PROPOFOL 10 MG/ML EMULSION: Performed by: NURSE ANESTHETIST, CERTIFIED REGISTERED

## 2022-06-02 PROCEDURE — 49505 PRP I/HERN INIT REDUC >5 YR: CPT | Performed by: SPECIALIST/TECHNOLOGIST, OTHER

## 2022-06-02 PROCEDURE — C1781 MESH (IMPLANTABLE): HCPCS | Performed by: SURGERY

## 2022-06-02 PROCEDURE — 25010000002 VANCOMYCIN 10 G RECONSTITUTED SOLUTION: Performed by: SURGERY

## 2022-06-02 DEVICE — PTCH MESH PROLENE 3D POLY LG W/EXT 5.5X12.8X5CM: Type: IMPLANTABLE DEVICE | Site: GROIN | Status: FUNCTIONAL

## 2022-06-02 RX ORDER — PHENYLEPHRINE HCL IN 0.9% NACL 0.5 MG/5ML
SYRINGE (ML) INTRAVENOUS AS NEEDED
Status: DISCONTINUED | OUTPATIENT
Start: 2022-06-02 | End: 2022-06-02 | Stop reason: SURG

## 2022-06-02 RX ORDER — FENTANYL CITRATE 50 UG/ML
INJECTION, SOLUTION INTRAMUSCULAR; INTRAVENOUS AS NEEDED
Status: DISCONTINUED | OUTPATIENT
Start: 2022-06-02 | End: 2022-06-02 | Stop reason: SURG

## 2022-06-02 RX ORDER — PROPOFOL 10 MG/ML
VIAL (ML) INTRAVENOUS AS NEEDED
Status: DISCONTINUED | OUTPATIENT
Start: 2022-06-02 | End: 2022-06-02 | Stop reason: SURG

## 2022-06-02 RX ORDER — SODIUM CHLORIDE 0.9 % (FLUSH) 0.9 %
10 SYRINGE (ML) INJECTION EVERY 12 HOURS SCHEDULED
Status: DISCONTINUED | OUTPATIENT
Start: 2022-06-02 | End: 2022-06-02 | Stop reason: HOSPADM

## 2022-06-02 RX ORDER — HYDROCODONE BITARTRATE AND ACETAMINOPHEN 7.5; 325 MG/1; MG/1
1 TABLET ORAL EVERY 6 HOURS PRN
Qty: 15 TABLET | Refills: 0 | Status: SHIPPED | OUTPATIENT
Start: 2022-06-02 | End: 2022-11-08 | Stop reason: HOSPADM

## 2022-06-02 RX ORDER — SODIUM CHLORIDE, SODIUM GLUCONATE, SODIUM ACETATE, POTASSIUM CHLORIDE AND MAGNESIUM CHLORIDE 526; 502; 368; 37; 30 MG/100ML; MG/100ML; MG/100ML; MG/100ML; MG/100ML
1000 INJECTION, SOLUTION INTRAVENOUS CONTINUOUS PRN
Status: DISCONTINUED | OUTPATIENT
Start: 2022-06-02 | End: 2022-06-02 | Stop reason: HOSPADM

## 2022-06-02 RX ORDER — TAMSULOSIN HYDROCHLORIDE 0.4 MG/1
0.4 CAPSULE ORAL ONCE
Status: COMPLETED | OUTPATIENT
Start: 2022-06-02 | End: 2022-06-02

## 2022-06-02 RX ORDER — BUPIVACAINE HYDROCHLORIDE 2.5 MG/ML
INJECTION, SOLUTION EPIDURAL; INFILTRATION; INTRACAUDAL AS NEEDED
Status: DISCONTINUED | OUTPATIENT
Start: 2022-06-02 | End: 2022-06-02 | Stop reason: HOSPADM

## 2022-06-02 RX ORDER — MIDAZOLAM HYDROCHLORIDE 1 MG/ML
INJECTION INTRAMUSCULAR; INTRAVENOUS AS NEEDED
Status: DISCONTINUED | OUTPATIENT
Start: 2022-06-02 | End: 2022-06-02 | Stop reason: SURG

## 2022-06-02 RX ORDER — HYDROCODONE BITARTRATE AND ACETAMINOPHEN 7.5; 325 MG/1; MG/1
1 TABLET ORAL ONCE AS NEEDED
Status: COMPLETED | OUTPATIENT
Start: 2022-06-02 | End: 2022-06-02

## 2022-06-02 RX ORDER — SODIUM CHLORIDE 0.9 % (FLUSH) 0.9 %
10 SYRINGE (ML) INJECTION AS NEEDED
Status: DISCONTINUED | OUTPATIENT
Start: 2022-06-02 | End: 2022-06-02 | Stop reason: HOSPADM

## 2022-06-02 RX ADMIN — MIDAZOLAM HYDROCHLORIDE 2 MG: 1 INJECTION, SOLUTION INTRAMUSCULAR; INTRAVENOUS at 10:34

## 2022-06-02 RX ADMIN — PROPOFOL 50 MG: 10 INJECTION, EMULSION INTRAVENOUS at 11:01

## 2022-06-02 RX ADMIN — FENTANYL CITRATE 50 MCG: 50 INJECTION INTRAMUSCULAR; INTRAVENOUS at 11:06

## 2022-06-02 RX ADMIN — HYDROMORPHONE HYDROCHLORIDE 0.5 MG: 1 INJECTION, SOLUTION INTRAMUSCULAR; INTRAVENOUS; SUBCUTANEOUS at 12:41

## 2022-06-02 RX ADMIN — HYDROMORPHONE HYDROCHLORIDE 0.5 MG: 1 INJECTION, SOLUTION INTRAMUSCULAR; INTRAVENOUS; SUBCUTANEOUS at 12:05

## 2022-06-02 RX ADMIN — SODIUM CHLORIDE, SODIUM GLUCONATE, SODIUM ACETATE, POTASSIUM CHLORIDE AND MAGNESIUM CHLORIDE: 526; 502; 368; 37; 30 INJECTION, SOLUTION INTRAVENOUS at 11:42

## 2022-06-02 RX ADMIN — Medication 100 MCG: at 10:46

## 2022-06-02 RX ADMIN — HYDROMORPHONE HYDROCHLORIDE 0.5 MG: 1 INJECTION, SOLUTION INTRAMUSCULAR; INTRAVENOUS; SUBCUTANEOUS at 11:32

## 2022-06-02 RX ADMIN — HYDROMORPHONE HYDROCHLORIDE 0.25 MG: 1 INJECTION, SOLUTION INTRAMUSCULAR; INTRAVENOUS; SUBCUTANEOUS at 11:24

## 2022-06-02 RX ADMIN — HYDROCODONE BITARTRATE AND ACETAMINOPHEN 1 TABLET: 7.5; 325 TABLET ORAL at 13:28

## 2022-06-02 RX ADMIN — Medication 1000 MG: at 10:40

## 2022-06-02 RX ADMIN — HYDROMORPHONE HYDROCHLORIDE 0.25 MG: 1 INJECTION, SOLUTION INTRAMUSCULAR; INTRAVENOUS; SUBCUTANEOUS at 11:12

## 2022-06-02 RX ADMIN — FENTANYL CITRATE 50 MCG: 50 INJECTION INTRAMUSCULAR; INTRAVENOUS at 11:01

## 2022-06-02 RX ADMIN — TAMSULOSIN HYDROCHLORIDE 0.4 MG: 0.4 CAPSULE ORAL at 12:19

## 2022-06-02 RX ADMIN — HYDROMORPHONE HYDROCHLORIDE 0.5 MG: 1 INJECTION, SOLUTION INTRAMUSCULAR; INTRAVENOUS; SUBCUTANEOUS at 12:19

## 2022-06-02 RX ADMIN — HYDROMORPHONE HYDROCHLORIDE 0.5 MG: 1 INJECTION, SOLUTION INTRAMUSCULAR; INTRAVENOUS; SUBCUTANEOUS at 12:28

## 2022-06-02 RX ADMIN — PROPOFOL 150 MG: 10 INJECTION, EMULSION INTRAVENOUS at 10:41

## 2022-06-02 RX ADMIN — SODIUM CHLORIDE, SODIUM GLUCONATE, SODIUM ACETATE, POTASSIUM CHLORIDE AND MAGNESIUM CHLORIDE 1000 ML: 526; 502; 368; 37; 30 INJECTION, SOLUTION INTRAVENOUS at 08:25

## 2022-06-02 NOTE — ANESTHESIA POSTPROCEDURE EVALUATION
Patient: Germain Masterson    Procedure Summary     Date: 06/02/22 Room / Location: [facility] OR 08 / [facility] OR    Anesthesia Start: 1038 Anesthesia Stop: 1152    Procedure: OPEN RIGHT INGUINAL HERNIA  REPAIR  WITH USE OF MESH (Right Abdomen) Diagnosis:       Non-recurrent unilateral inguinal hernia without obstruction or gangrene      (Non-recurrent unilateral inguinal hernia without obstruction or gangrene [K40.90])    Surgeons: oJnes Parra MD Provider: Cristian Orr MD    Anesthesia Type: general ASA Status: 3          Anesthesia Type: general    Vitals  No vitals data found for the desired time range.          Post Anesthesia Care and Evaluation    Patient location during evaluation: PACU  Patient participation: waiting for patient participation  Pain management: adequate  Airway patency: patent  Anesthetic complications: No anesthetic complications  PONV Status: none  Cardiovascular status: hemodynamically stable  Respiratory status: spontaneous ventilation and room air  Hydration status: acceptable    Comments: 105/60 58 16 94%

## 2022-06-02 NOTE — ANESTHESIA PREPROCEDURE EVALUATION
Anesthesia Evaluation     no history of anesthetic complications:  NPO Solid Status: > 8 hours  NPO Liquid Status: > 8 hours           Airway   Mallampati: I  TM distance: >3 FB  Neck ROM: full  No difficulty expected  Dental    (+) poor dentition        Pulmonary - normal exam    breath sounds clear to auscultation  (+) a smoker (0.5 ppd) Current Abstained day of surgery, shortness of breath,   (-) COPD, asthma, sleep apnea    ROS comment: snores  Cardiovascular - normal exam  Exercise tolerance: poor (<4 METS)    ECG reviewed  Rhythm: regular  Rate: normal    (+) hypertension (no meds),   (-) valvular problems/murmurs, dysrhythmias, angina, cardiac stents, DVT, hyperlipidemia    ROS comment: Normal sinus rhythm  Normal ECG  When compared with ECG of 29-SEP-2020 10:23,  Questionable change in QRS axis    Neuro/Psych  (+) psychiatric history (no meds) Depression,    (-) seizures, TIA, CVA, headaches, weakness, numbness  GI/Hepatic/Renal/Endo    (+)  GERD well controlled,  liver disease fatty liver disease,   (-) hepatitis, no renal disease, diabetes, no thyroid disorder    Musculoskeletal     (+) arthralgias,   Abdominal    Substance History   (-) alcohol use, drug use     OB/GYN          Other   arthritis (knees),      (-) history of cancer  ROS/Med Hx Other: There is right fat containing inguinal hernia extending to the right scrotal sac measuring 16.4 x 5.3 x 5.7 cm (craniocaudal by AP by transverse).  Diffuse fatty change of liver.  Mild streaky change in the lung bases posteriorly.  Appendix is normal.    Right inguinal hernia                  Anesthesia Plan    ASA 3     general     intravenous induction     Anesthetic plan, all risks, benefits, and alternatives have been provided, discussed and informed consent has been obtained with: patient and spouse/significant other.        CODE STATUS:

## 2022-06-02 NOTE — INTERVAL H&P NOTE
H&P reviewed. The patient was examined and there are no changes to the H&P.      Temp:  [97.7 °F (36.5 °C)] 97.7 °F (36.5 °C)  Heart Rate:  [71] 71  Resp:  [18] 18  BP: (149)/(86) 149/86

## 2022-06-02 NOTE — OP NOTE
INGUINAL HERNIA REPAIR  Procedure Note    Germain Masterson  6/2/2022    Pre-op Diagnosis:   Non-recurrent unilateral inguinal hernia without obstruction or gangrene [K40.90]    Post-op Diagnosis:     Post-Op Diagnosis Codes:     * Non-recurrent unilateral inguinal hernia without obstruction or gangrene [K40.90] indiirect    Procedure/CPT® Codes:      Procedure(s):  OPEN RIGHT INGUINAL HERNIA  REPAIR  WITH USE OF MESH    Surgeon(s):  Jones Parra MD    Anesthesia: General    Staff:   Circulator: Anh Carlos RN  Scrub Person: Karli Farrell  Assistant: Yen Armstrong CSA    Assistant: Yen Armstrong CSA was responsible for performing the following activities: Retraction, Suction, Irrigation, Suturing, Closing and Placing Dressing and their skilled assistance was necessary for the success of this case.     Estimated Blood Loss: minimal    Specimens:                None      Drains: * No LDAs found *    Indications: 61-year-old gentleman presents with symptomatic right inguinal hernia.  Presents now for repair.    Findings: Large indirect right inguinal hernia.  Omentum present within the sac able to be reduced easily.  Repaired with 3D Prolene mesh    Complications: None    Procedure: The patient was brought to the operating room where he was placed under general endotracheal anesthesia without difficulty.  Once asleep, Tadeo catheter was placed.  He had SCDs in place.  He received vancomycin IV antibiotics perioperatively.  His right groin and lower abdomen were prepped and draped in normal sterile fashion.  Appropriate time out was taken and everyone was in agreement.  We injected a total of 20 mL of local.  We made a transverse incision sharply in his right groin with the scalpel and followed that down through the subcutaneous tissue with electrocautery. The external oblique was opened down to the external ring.  We were able to get around the cord structures at the pubic tubercle.  We  opened cremasteric muscle and explored the cord structures.  There was a large sac extending down to near the top of the scrotum.  We were able to get around the sac and stayed away from the other cord structures specifically the vas and vessels to testicle.  We removed the tip of the sac completely.  I opened the sac.  Omentum was present within the sac.  We were able to reduce the omentum without any difficulty.  We then dissected the proximal portion of the sac up to the internal ring and was able to invert it.  We closed the sac with 3-0 Vicryl stitch.  We then inverted the sac and we used a large 3D mesh and placed the plug portion into the inverted sac and then deployed it deep to the fascia.  The patch portion was then secured to the pubic tubercle, iliopubic tract and conjoint tendon with 2-0 Nurolon stitches.  The mesh laterally was cut and wrapped around the cord structures and secured with another 2-0 Nurolon stitch and then the tails were tucked up under the external oblique laterally.  We then approximated the external oblique with a running 3-0 Vicryl stitch.  Leta fascia was approximated with 4-0 Monocryl simple stitch and the skin was closed with a running 4-0 Monocryl subcuticular stitch.  Glue was used as final skin closure.  The patient was awakened, extubated and transferred to the recovery room in awake, stable condition.         Disposition: Transfer to recovery in stable condition        Jones Parra MD     Date: 6/2/2022  Time: 11:49 CDT

## 2022-06-02 NOTE — ADDENDUM NOTE
Addendum  created 06/02/22 1218 by Florinda Stephens, CRNA    Order list changed, Order sets accessed, Pharmacy for encounter modified

## 2022-06-08 LAB
QT INTERVAL: 366 MS
QTC INTERVAL: 427 MS

## 2022-06-13 NOTE — H&P
61-year-old gentleman returns to be set up for surgery for right inguinal hernia repair.  Patient had a known right inguinal hernia for at least 4 to 5 years.  Seems of gotten progressively larger.  Recently was doing some moving and noticed that his hernia was causing more problems.  No GI no  symptoms.  No prior surgery in this area.            Vitals:     04/13/22 1353   BP: 134/82   Pulse: 88   Temp: 98 °F (36.7 °C)         Allergies: No Known Allergies     Home Medications:          Prior to Admission medications    Medication Sig Start Date End Date Taking? Authorizing Provider   Acetaminophen (TYLENOL ARTHRITIS PAIN PO) Take 1 tablet by mouth Daily.     Yes Provider, MD Rosemarie   pantoprazole (PROTONIX) 40 MG EC tablet Take 1 tablet by mouth 2 (Two) Times a Day. 10/15/21   Yes Andry Aquino MD   methylPREDNISolone (MEDROL) 4 MG dose pack Take as directed on package instructions. 12/11/21     Christiano Morales APRN         Social History   Social History            Socioeconomic History   • Marital status: Single   Tobacco Use   • Smoking status: Current Every Day Smoker       Packs/day: 0.50       Years: 45.00       Pack years: 22.50       Types: Cigars   • Smokeless tobacco: Never Used   • Tobacco comment: Used to smoke cigarettes for 15 years 1.5ppd   Substance and Sexual Activity   • Alcohol use: No   • Drug use: No   • Sexual activity: Yes       Partners: Female       Birth control/protection: None            Medical History        Past Medical History:   Diagnosis Date   • Hypertension                    Family History   Problem Relation Age of Onset   • Hypertension Mother     • Cancer Mother     • Cancer Father           Surgical History   Past Surgical History:   Procedure Laterality Date   • CYSTECTOMY         back   • ENDOSCOPY N/A 10/2/2020     Procedure: ESOPHAGOGASTRODUODENOSCOPY;  Surgeon: Jovan Powell MD;  Location: [facility] ENDOSCOPY;  Service: Gastroenterology;  Laterality:  N/A;         Review of systems  Denies shortness of breath  Denies chest pain  Denies any abdominal complaints  Does not take any anticoagulation  No urinary complaints           Alert and appropriate  Nontoxic  Abdomen soft  Examined standing and supine and he has a reducible complete right inguinal hernia.  No evidence of a left inguinal hernia no evidence of femoral hernia on either side.  Both testicles are descended without any palpable masses  Heart regular rate and rhythm  Lungs are clear  Extremities unremarkable        Assessment and plan  Large and/or complete right inguinal hernia.  Recommend open repair likely with use of mesh.  Fully discussed the procedure alternatives risk benefits with the patient he clearly understands and wishes to proceed      5/28/22 update  61-year-old gentleman who was set up for right inguinal hernia surgery few weeks ago but ended up canceling the surgery at the last minute.  He evidently had difficulty with his ride getting to the hospital.  He has been seen in the emergency room this past week and had a CT scan which demonstrated fat in his right inguinal hernia otherwise the study was unremarkable..  Patient wishes to go ahead and be set up for his right inguinal hernia repair now.  His exam is unchanged.  His right inguinal hernia does reduce.  Right testicle is without any mass.  Again went over with him and his wife about the surgery of right inguinal hernia repair and likely use of mesh.  They clearly understand the procedure alternatives risk benefits and wishes to proceed      6/2/22  H&P above and update reviewed. The patient was examined and there are no changes to the H&P.       Temp:  [97.7 °F (36.5 °C)] 97.7 °F (36.5 °C)  Heart Rate:  [71] 71  Resp:  [18] 18  BP: (149)/(86) 149/86

## 2022-10-02 ENCOUNTER — APPOINTMENT (OUTPATIENT)
Dept: GENERAL RADIOLOGY | Facility: HOSPITAL | Age: 62
End: 2022-10-02

## 2022-10-02 ENCOUNTER — HOSPITAL ENCOUNTER (OUTPATIENT)
Facility: HOSPITAL | Age: 62
Setting detail: OBSERVATION
Discharge: HOME | End: 2022-11-08
Attending: STUDENT IN AN ORGANIZED HEALTH CARE EDUCATION/TRAINING PROGRAM | Admitting: FAMILY MEDICINE

## 2022-10-02 ENCOUNTER — APPOINTMENT (OUTPATIENT)
Dept: CT IMAGING | Facility: HOSPITAL | Age: 62
End: 2022-10-02

## 2022-10-02 DIAGNOSIS — Z78.9 IMPAIRED MOBILITY AND ADLS: ICD-10-CM

## 2022-10-02 DIAGNOSIS — Z74.09 IMPAIRED FUNCTIONAL MOBILITY, BALANCE, GAIT, AND ENDURANCE: ICD-10-CM

## 2022-10-02 DIAGNOSIS — R41.3 AMNESIA MEMORY LOSS: Primary | ICD-10-CM

## 2022-10-02 DIAGNOSIS — Z74.09 IMPAIRED MOBILITY AND ADLS: ICD-10-CM

## 2022-10-02 LAB
ALBUMIN SERPL-MCNC: 4 G/DL (ref 3.5–5.2)
ALBUMIN/GLOB SERPL: 1.3 G/DL
ALP SERPL-CCNC: 90 U/L (ref 39–117)
ALT SERPL W P-5'-P-CCNC: 11 U/L (ref 1–41)
AMMONIA BLD-SCNC: 12 UMOL/L (ref 16–60)
AMPHET+METHAMPHET UR QL: NEGATIVE
AMPHETAMINES UR QL: NEGATIVE
ANION GAP SERPL CALCULATED.3IONS-SCNC: 9 MMOL/L (ref 5–15)
APAP SERPL-MCNC: <5 MCG/ML (ref 0–30)
AST SERPL-CCNC: 10 U/L (ref 1–40)
BARBITURATES UR QL SCN: NEGATIVE
BASOPHILS # BLD AUTO: 0.05 10*3/MM3 (ref 0–0.2)
BASOPHILS NFR BLD AUTO: 0.5 % (ref 0–1.5)
BENZODIAZ UR QL SCN: NEGATIVE
BILIRUB SERPL-MCNC: 0.3 MG/DL (ref 0–1.2)
BILIRUB UR QL STRIP: NEGATIVE
BUN SERPL-MCNC: 14 MG/DL (ref 8–23)
BUN/CREAT SERPL: 14 (ref 7–25)
BUPRENORPHINE SERPL-MCNC: NEGATIVE NG/ML
CALCIUM SPEC-SCNC: 9.4 MG/DL (ref 8.6–10.5)
CANNABINOIDS SERPL QL: NEGATIVE
CHLORIDE SERPL-SCNC: 105 MMOL/L (ref 98–107)
CK SERPL-CCNC: 51 U/L (ref 20–200)
CLARITY UR: CLEAR
CO2 SERPL-SCNC: 27 MMOL/L (ref 22–29)
COCAINE UR QL: NEGATIVE
COLOR UR: YELLOW
CREAT SERPL-MCNC: 1 MG/DL (ref 0.76–1.27)
D-DIMER, QUANTITATIVE (MAD,POW, STR): <270 NG/ML (FEU) (ref 0–470)
DEPRECATED RDW RBC AUTO: 43.9 FL (ref 37–54)
EGFRCR SERPLBLD CKD-EPI 2021: 85.6 ML/MIN/1.73
EOSINOPHIL # BLD AUTO: 0.1 10*3/MM3 (ref 0–0.4)
EOSINOPHIL NFR BLD AUTO: 1.1 % (ref 0.3–6.2)
ERYTHROCYTE [DISTWIDTH] IN BLOOD BY AUTOMATED COUNT: 12.8 % (ref 12.3–15.4)
ETHANOL BLD-MCNC: <10 MG/DL (ref 0–10)
ETHANOL UR QL: <0.01 %
FLUAV SUBTYP SPEC NAA+PROBE: NOT DETECTED
FLUBV RNA ISLT QL NAA+PROBE: NOT DETECTED
FOLATE SERPL-MCNC: 8.54 NG/ML (ref 4.78–24.2)
GLOBULIN UR ELPH-MCNC: 3.1 GM/DL
GLUCOSE SERPL-MCNC: 93 MG/DL (ref 65–99)
GLUCOSE UR STRIP-MCNC: NEGATIVE MG/DL
HCT VFR BLD AUTO: 45.4 % (ref 37.5–51)
HGB BLD-MCNC: 14.6 G/DL (ref 13–17.7)
HGB UR QL STRIP.AUTO: NEGATIVE
HOLD SPECIMEN: NORMAL
HOLD SPECIMEN: NORMAL
IMM GRANULOCYTES # BLD AUTO: 0.03 10*3/MM3 (ref 0–0.05)
IMM GRANULOCYTES NFR BLD AUTO: 0.3 % (ref 0–0.5)
KETONES UR QL STRIP: NEGATIVE
LEUKOCYTE ESTERASE UR QL STRIP.AUTO: NEGATIVE
LIPASE SERPL-CCNC: 24 U/L (ref 13–60)
LYMPHOCYTES # BLD AUTO: 1.6 10*3/MM3 (ref 0.7–3.1)
LYMPHOCYTES NFR BLD AUTO: 17.5 % (ref 19.6–45.3)
MCH RBC QN AUTO: 30.2 PG (ref 26.6–33)
MCHC RBC AUTO-ENTMCNC: 32.2 G/DL (ref 31.5–35.7)
MCV RBC AUTO: 94 FL (ref 79–97)
METHADONE UR QL SCN: NEGATIVE
MONOCYTES # BLD AUTO: 0.83 10*3/MM3 (ref 0.1–0.9)
MONOCYTES NFR BLD AUTO: 9.1 % (ref 5–12)
NEUTROPHILS NFR BLD AUTO: 6.53 10*3/MM3 (ref 1.7–7)
NEUTROPHILS NFR BLD AUTO: 71.5 % (ref 42.7–76)
NITRITE UR QL STRIP: NEGATIVE
NRBC BLD AUTO-RTO: 0 /100 WBC (ref 0–0.2)
NT-PROBNP SERPL-MCNC: 51.5 PG/ML (ref 0–900)
OPIATES UR QL: NEGATIVE
OXYCODONE UR QL SCN: NEGATIVE
PCP UR QL SCN: NEGATIVE
PH UR STRIP.AUTO: 8 [PH] (ref 5–9)
PLATELET # BLD AUTO: 247 10*3/MM3 (ref 140–450)
PMV BLD AUTO: 9.3 FL (ref 6–12)
POTASSIUM SERPL-SCNC: 4.4 MMOL/L (ref 3.5–5.2)
PROPOXYPH UR QL: NEGATIVE
PROT SERPL-MCNC: 7.1 G/DL (ref 6–8.5)
PROT UR QL STRIP: NEGATIVE
RBC # BLD AUTO: 4.83 10*6/MM3 (ref 4.14–5.8)
SALICYLATES SERPL-MCNC: <0.3 MG/DL
SARS-COV-2 RNA PNL SPEC NAA+PROBE: NOT DETECTED
SODIUM SERPL-SCNC: 141 MMOL/L (ref 136–145)
SP GR UR STRIP: 1.01 (ref 1–1.03)
T4 FREE SERPL-MCNC: 1.1 NG/DL (ref 0.93–1.7)
TRICYCLICS UR QL SCN: NEGATIVE
TROPONIN T SERPL-MCNC: <0.01 NG/ML (ref 0–0.03)
TROPONIN T SERPL-MCNC: <0.01 NG/ML (ref 0–0.03)
TSH SERPL DL<=0.05 MIU/L-ACNC: 1.56 UIU/ML (ref 0.27–4.2)
TSH SERPL DL<=0.05 MIU/L-ACNC: 1.65 UIU/ML (ref 0.27–4.2)
UROBILINOGEN UR QL STRIP: NORMAL
VIT B12 BLD-MCNC: 356 PG/ML (ref 211–946)
WBC NRBC COR # BLD: 9.14 10*3/MM3 (ref 3.4–10.8)

## 2022-10-02 PROCEDURE — 99219 PR INITIAL OBSERVATION CARE/DAY 50 MINUTES: CPT | Performed by: FAMILY MEDICINE

## 2022-10-02 PROCEDURE — 99285 EMERGENCY DEPT VISIT HI MDM: CPT

## 2022-10-02 PROCEDURE — 84443 ASSAY THYROID STIM HORMONE: CPT | Performed by: STUDENT IN AN ORGANIZED HEALTH CARE EDUCATION/TRAINING PROGRAM

## 2022-10-02 PROCEDURE — 84484 ASSAY OF TROPONIN QUANT: CPT | Performed by: STUDENT IN AN ORGANIZED HEALTH CARE EDUCATION/TRAINING PROGRAM

## 2022-10-02 PROCEDURE — 82550 ASSAY OF CK (CPK): CPT | Performed by: STUDENT IN AN ORGANIZED HEALTH CARE EDUCATION/TRAINING PROGRAM

## 2022-10-02 PROCEDURE — C9803 HOPD COVID-19 SPEC COLLECT: HCPCS

## 2022-10-02 PROCEDURE — 84439 ASSAY OF FREE THYROXINE: CPT | Performed by: FAMILY MEDICINE

## 2022-10-02 PROCEDURE — 71045 X-RAY EXAM CHEST 1 VIEW: CPT

## 2022-10-02 PROCEDURE — 82077 ASSAY SPEC XCP UR&BREATH IA: CPT | Performed by: STUDENT IN AN ORGANIZED HEALTH CARE EDUCATION/TRAINING PROGRAM

## 2022-10-02 PROCEDURE — G0378 HOSPITAL OBSERVATION PER HR: HCPCS

## 2022-10-02 PROCEDURE — 93010 ELECTROCARDIOGRAM REPORT: CPT | Performed by: INTERNAL MEDICINE

## 2022-10-02 PROCEDURE — 85025 COMPLETE CBC W/AUTO DIFF WBC: CPT | Performed by: STUDENT IN AN ORGANIZED HEALTH CARE EDUCATION/TRAINING PROGRAM

## 2022-10-02 PROCEDURE — 80306 DRUG TEST PRSMV INSTRMNT: CPT | Performed by: FAMILY MEDICINE

## 2022-10-02 PROCEDURE — 83690 ASSAY OF LIPASE: CPT | Performed by: STUDENT IN AN ORGANIZED HEALTH CARE EDUCATION/TRAINING PROGRAM

## 2022-10-02 PROCEDURE — 82140 ASSAY OF AMMONIA: CPT | Performed by: STUDENT IN AN ORGANIZED HEALTH CARE EDUCATION/TRAINING PROGRAM

## 2022-10-02 PROCEDURE — 87636 SARSCOV2 & INF A&B AMP PRB: CPT | Performed by: STUDENT IN AN ORGANIZED HEALTH CARE EDUCATION/TRAINING PROGRAM

## 2022-10-02 PROCEDURE — 84443 ASSAY THYROID STIM HORMONE: CPT | Performed by: FAMILY MEDICINE

## 2022-10-02 PROCEDURE — 82746 ASSAY OF FOLIC ACID SERUM: CPT | Performed by: FAMILY MEDICINE

## 2022-10-02 PROCEDURE — 36415 COLL VENOUS BLD VENIPUNCTURE: CPT

## 2022-10-02 PROCEDURE — 80143 DRUG ASSAY ACETAMINOPHEN: CPT | Performed by: STUDENT IN AN ORGANIZED HEALTH CARE EDUCATION/TRAINING PROGRAM

## 2022-10-02 PROCEDURE — 70450 CT HEAD/BRAIN W/O DYE: CPT

## 2022-10-02 PROCEDURE — 80053 COMPREHEN METABOLIC PANEL: CPT | Performed by: STUDENT IN AN ORGANIZED HEALTH CARE EDUCATION/TRAINING PROGRAM

## 2022-10-02 PROCEDURE — 83880 ASSAY OF NATRIURETIC PEPTIDE: CPT | Performed by: STUDENT IN AN ORGANIZED HEALTH CARE EDUCATION/TRAINING PROGRAM

## 2022-10-02 PROCEDURE — 82607 VITAMIN B-12: CPT | Performed by: FAMILY MEDICINE

## 2022-10-02 PROCEDURE — 93005 ELECTROCARDIOGRAM TRACING: CPT | Performed by: STUDENT IN AN ORGANIZED HEALTH CARE EDUCATION/TRAINING PROGRAM

## 2022-10-02 PROCEDURE — 85379 FIBRIN DEGRADATION QUANT: CPT | Performed by: STUDENT IN AN ORGANIZED HEALTH CARE EDUCATION/TRAINING PROGRAM

## 2022-10-02 PROCEDURE — 80179 DRUG ASSAY SALICYLATE: CPT | Performed by: STUDENT IN AN ORGANIZED HEALTH CARE EDUCATION/TRAINING PROGRAM

## 2022-10-02 PROCEDURE — 81003 URINALYSIS AUTO W/O SCOPE: CPT | Performed by: STUDENT IN AN ORGANIZED HEALTH CARE EDUCATION/TRAINING PROGRAM

## 2022-10-02 RX ORDER — SODIUM CHLORIDE 0.9 % (FLUSH) 0.9 %
10 SYRINGE (ML) INJECTION EVERY 12 HOURS SCHEDULED
Status: DISCONTINUED | OUTPATIENT
Start: 2022-10-02 | End: 2022-10-26

## 2022-10-02 RX ORDER — CALCIUM CARBONATE 200(500)MG
2 TABLET,CHEWABLE ORAL 2 TIMES DAILY PRN
Status: DISCONTINUED | OUTPATIENT
Start: 2022-10-02 | End: 2022-10-19

## 2022-10-02 RX ORDER — LANOLIN ALCOHOL/MO/W.PET/CERES
6 CREAM (GRAM) TOPICAL NIGHTLY PRN
Status: DISCONTINUED | OUTPATIENT
Start: 2022-10-02 | End: 2022-11-08 | Stop reason: HOSPADM

## 2022-10-02 RX ORDER — PANTOPRAZOLE SODIUM 40 MG/1
40 TABLET, DELAYED RELEASE ORAL 2 TIMES DAILY
Status: DISCONTINUED | OUTPATIENT
Start: 2022-10-02 | End: 2022-10-19

## 2022-10-02 RX ORDER — SODIUM CHLORIDE 0.9 % (FLUSH) 0.9 %
10 SYRINGE (ML) INJECTION AS NEEDED
Status: DISCONTINUED | OUTPATIENT
Start: 2022-10-02 | End: 2022-11-08 | Stop reason: HOSPADM

## 2022-10-02 RX ORDER — SODIUM CHLORIDE 0.9 % (FLUSH) 0.9 %
10 SYRINGE (ML) INJECTION AS NEEDED
Status: DISCONTINUED | OUTPATIENT
Start: 2022-10-02 | End: 2022-10-19

## 2022-10-02 RX ORDER — AMLODIPINE BESYLATE 5 MG/1
5 TABLET ORAL
Status: DISCONTINUED | OUTPATIENT
Start: 2022-10-02 | End: 2022-10-03

## 2022-10-02 RX ADMIN — AMLODIPINE BESYLATE 5 MG: 5 TABLET ORAL at 16:33

## 2022-10-02 RX ADMIN — PANTOPRAZOLE SODIUM 40 MG: 40 TABLET, DELAYED RELEASE ORAL at 20:31

## 2022-10-02 RX ADMIN — MELATONIN 6 MG: 3 TAB ORAL at 20:31

## 2022-10-03 ENCOUNTER — APPOINTMENT (OUTPATIENT)
Dept: PULMONOLOGY | Facility: HOSPITAL | Age: 62
End: 2022-10-03

## 2022-10-03 ENCOUNTER — APPOINTMENT (OUTPATIENT)
Dept: MRI IMAGING | Facility: HOSPITAL | Age: 62
End: 2022-10-03

## 2022-10-03 PROBLEM — R41.89 NEUROCOGNITIVE DEFICITS: Status: ACTIVE | Noted: 2022-10-03

## 2022-10-03 PROBLEM — R29.818 NEUROCOGNITIVE DEFICITS: Status: ACTIVE | Noted: 2022-10-03

## 2022-10-03 PROBLEM — F32.A DEPRESSION: Status: ACTIVE | Noted: 2022-10-03

## 2022-10-03 LAB
HIV1+2 AB SER QL: NORMAL
QT INTERVAL: 368 MS
QTC INTERVAL: 405 MS
RPR SER QL: NORMAL

## 2022-10-03 PROCEDURE — 95816 EEG AWAKE AND DROWSY: CPT

## 2022-10-03 PROCEDURE — 86592 SYPHILIS TEST NON-TREP QUAL: CPT | Performed by: FAMILY MEDICINE

## 2022-10-03 PROCEDURE — G0378 HOSPITAL OBSERVATION PER HR: HCPCS

## 2022-10-03 PROCEDURE — 70551 MRI BRAIN STEM W/O DYE: CPT

## 2022-10-03 PROCEDURE — 99224 PR SBSQ OBSERVATION CARE/DAY 15 MINUTES: CPT | Performed by: STUDENT IN AN ORGANIZED HEALTH CARE EDUCATION/TRAINING PROGRAM

## 2022-10-03 PROCEDURE — G0432 EIA HIV-1/HIV-2 SCREEN: HCPCS | Performed by: FAMILY MEDICINE

## 2022-10-03 PROCEDURE — 90792 PSYCH DIAG EVAL W/MED SRVCS: CPT | Performed by: PSYCHIATRY & NEUROLOGY

## 2022-10-03 RX ORDER — PAROXETINE HYDROCHLORIDE 20 MG/1
20 TABLET, FILM COATED ORAL DAILY
Status: DISCONTINUED | OUTPATIENT
Start: 2022-10-03 | End: 2022-10-03

## 2022-10-03 RX ORDER — AMLODIPINE BESYLATE 10 MG/1
10 TABLET ORAL
Status: DISCONTINUED | OUTPATIENT
Start: 2022-10-04 | End: 2022-11-08 | Stop reason: HOSPADM

## 2022-10-03 RX ORDER — ESCITALOPRAM OXALATE 10 MG/1
10 TABLET ORAL DAILY
Status: DISCONTINUED | OUTPATIENT
Start: 2022-10-04 | End: 2022-11-08 | Stop reason: HOSPADM

## 2022-10-03 RX ADMIN — PANTOPRAZOLE SODIUM 40 MG: 40 TABLET, DELAYED RELEASE ORAL at 20:49

## 2022-10-03 RX ADMIN — AMLODIPINE BESYLATE 5 MG: 5 TABLET ORAL at 08:47

## 2022-10-03 RX ADMIN — PANTOPRAZOLE SODIUM 40 MG: 40 TABLET, DELAYED RELEASE ORAL at 08:47

## 2022-10-03 RX ADMIN — PAROXETINE HYDROCHLORIDE 20 MG: 20 TABLET, FILM COATED ORAL at 08:47

## 2022-10-03 RX ADMIN — Medication 10 ML: at 20:49

## 2022-10-04 PROCEDURE — 99214 OFFICE O/P EST MOD 30 MIN: CPT | Performed by: PSYCHIATRY & NEUROLOGY

## 2022-10-04 PROCEDURE — 97162 PT EVAL MOD COMPLEX 30 MIN: CPT

## 2022-10-04 PROCEDURE — 99225 PR SBSQ OBSERVATION CARE/DAY 25 MINUTES: CPT | Performed by: FAMILY MEDICINE

## 2022-10-04 PROCEDURE — 97166 OT EVAL MOD COMPLEX 45 MIN: CPT

## 2022-10-04 PROCEDURE — G0378 HOSPITAL OBSERVATION PER HR: HCPCS

## 2022-10-04 RX ORDER — RIVASTIGMINE 4.6 MG/24H
1 PATCH, EXTENDED RELEASE TRANSDERMAL DAILY
Status: DISCONTINUED | OUTPATIENT
Start: 2022-10-05 | End: 2022-11-08 | Stop reason: HOSPADM

## 2022-10-04 RX ADMIN — PANTOPRAZOLE SODIUM 40 MG: 40 TABLET, DELAYED RELEASE ORAL at 09:10

## 2022-10-04 RX ADMIN — AMLODIPINE BESYLATE 10 MG: 10 TABLET ORAL at 09:10

## 2022-10-04 RX ADMIN — ESCITALOPRAM OXALATE 10 MG: 10 TABLET ORAL at 09:10

## 2022-10-04 RX ADMIN — MELATONIN 6 MG: 3 TAB ORAL at 20:27

## 2022-10-04 RX ADMIN — PANTOPRAZOLE SODIUM 40 MG: 40 TABLET, DELAYED RELEASE ORAL at 20:27

## 2022-10-05 LAB
ALBUMIN SERPL-MCNC: 4.4 G/DL (ref 3.5–5.2)
ALBUMIN/GLOB SERPL: 1.4 G/DL
ALP SERPL-CCNC: 88 U/L (ref 39–117)
ALT SERPL W P-5'-P-CCNC: 13 U/L (ref 1–41)
ANION GAP SERPL CALCULATED.3IONS-SCNC: 10 MMOL/L (ref 5–15)
AST SERPL-CCNC: 13 U/L (ref 1–40)
BASOPHILS # BLD AUTO: 0.06 10*3/MM3 (ref 0–0.2)
BASOPHILS NFR BLD AUTO: 0.6 % (ref 0–1.5)
BILIRUB SERPL-MCNC: 0.4 MG/DL (ref 0–1.2)
BUN SERPL-MCNC: 19 MG/DL (ref 8–23)
BUN/CREAT SERPL: 21.3 (ref 7–25)
CALCIUM SPEC-SCNC: 9.5 MG/DL (ref 8.6–10.5)
CHLORIDE SERPL-SCNC: 103 MMOL/L (ref 98–107)
CO2 SERPL-SCNC: 25 MMOL/L (ref 22–29)
CREAT SERPL-MCNC: 0.89 MG/DL (ref 0.76–1.27)
DEPRECATED RDW RBC AUTO: 43.3 FL (ref 37–54)
EGFRCR SERPLBLD CKD-EPI 2021: 97.5 ML/MIN/1.73
EOSINOPHIL # BLD AUTO: 0.04 10*3/MM3 (ref 0–0.4)
EOSINOPHIL NFR BLD AUTO: 0.4 % (ref 0.3–6.2)
ERYTHROCYTE [DISTWIDTH] IN BLOOD BY AUTOMATED COUNT: 12.8 % (ref 12.3–15.4)
GLOBULIN UR ELPH-MCNC: 3.2 GM/DL
GLUCOSE SERPL-MCNC: 115 MG/DL (ref 65–99)
HCT VFR BLD AUTO: 45.4 % (ref 37.5–51)
HGB BLD-MCNC: 15.1 G/DL (ref 13–17.7)
IMM GRANULOCYTES # BLD AUTO: 0.17 10*3/MM3 (ref 0–0.05)
IMM GRANULOCYTES NFR BLD AUTO: 1.8 % (ref 0–0.5)
LYMPHOCYTES # BLD AUTO: 1.38 10*3/MM3 (ref 0.7–3.1)
LYMPHOCYTES NFR BLD AUTO: 14.5 % (ref 19.6–45.3)
MAGNESIUM SERPL-MCNC: 2.2 MG/DL (ref 1.6–2.4)
MCH RBC QN AUTO: 30.8 PG (ref 26.6–33)
MCHC RBC AUTO-ENTMCNC: 33.3 G/DL (ref 31.5–35.7)
MCV RBC AUTO: 92.7 FL (ref 79–97)
MONOCYTES # BLD AUTO: 0.46 10*3/MM3 (ref 0.1–0.9)
MONOCYTES NFR BLD AUTO: 4.8 % (ref 5–12)
NEUTROPHILS NFR BLD AUTO: 7.39 10*3/MM3 (ref 1.7–7)
NEUTROPHILS NFR BLD AUTO: 77.9 % (ref 42.7–76)
NRBC BLD AUTO-RTO: 0 /100 WBC (ref 0–0.2)
PLATELET # BLD AUTO: 267 10*3/MM3 (ref 140–450)
PMV BLD AUTO: 9.4 FL (ref 6–12)
POTASSIUM SERPL-SCNC: 4.5 MMOL/L (ref 3.5–5.2)
PROT SERPL-MCNC: 7.6 G/DL (ref 6–8.5)
RBC # BLD AUTO: 4.9 10*6/MM3 (ref 4.14–5.8)
SODIUM SERPL-SCNC: 138 MMOL/L (ref 136–145)
WBC NRBC COR # BLD: 9.5 10*3/MM3 (ref 3.4–10.8)

## 2022-10-05 PROCEDURE — 85025 COMPLETE CBC W/AUTO DIFF WBC: CPT | Performed by: FAMILY MEDICINE

## 2022-10-05 PROCEDURE — 97535 SELF CARE MNGMENT TRAINING: CPT

## 2022-10-05 PROCEDURE — G0378 HOSPITAL OBSERVATION PER HR: HCPCS

## 2022-10-05 PROCEDURE — 83735 ASSAY OF MAGNESIUM: CPT | Performed by: FAMILY MEDICINE

## 2022-10-05 PROCEDURE — 99225 PR SBSQ OBSERVATION CARE/DAY 25 MINUTES: CPT | Performed by: FAMILY MEDICINE

## 2022-10-05 PROCEDURE — 97110 THERAPEUTIC EXERCISES: CPT

## 2022-10-05 PROCEDURE — 80053 COMPREHEN METABOLIC PANEL: CPT | Performed by: FAMILY MEDICINE

## 2022-10-05 RX ORDER — TIZANIDINE 4 MG/1
4 TABLET ORAL EVERY 8 HOURS PRN
Status: DISCONTINUED | OUTPATIENT
Start: 2022-10-05 | End: 2022-10-11

## 2022-10-05 RX ADMIN — AMLODIPINE BESYLATE 10 MG: 10 TABLET ORAL at 09:39

## 2022-10-05 RX ADMIN — PANTOPRAZOLE SODIUM 40 MG: 40 TABLET, DELAYED RELEASE ORAL at 20:17

## 2022-10-05 RX ADMIN — MELATONIN 6 MG: 3 TAB ORAL at 20:19

## 2022-10-05 RX ADMIN — RIVASTIGMINE TRANSDERMAL SYSTEM 1 PATCH: 4.6 PATCH, EXTENDED RELEASE TRANSDERMAL at 09:38

## 2022-10-05 RX ADMIN — ESCITALOPRAM OXALATE 10 MG: 10 TABLET ORAL at 09:38

## 2022-10-05 RX ADMIN — TIZANIDINE 4 MG: 4 TABLET ORAL at 20:20

## 2022-10-05 RX ADMIN — PANTOPRAZOLE SODIUM 40 MG: 40 TABLET, DELAYED RELEASE ORAL at 09:38

## 2022-10-06 PROCEDURE — 97110 THERAPEUTIC EXERCISES: CPT

## 2022-10-06 PROCEDURE — G0378 HOSPITAL OBSERVATION PER HR: HCPCS

## 2022-10-06 PROCEDURE — 97116 GAIT TRAINING THERAPY: CPT

## 2022-10-06 PROCEDURE — 99225 PR SBSQ OBSERVATION CARE/DAY 25 MINUTES: CPT | Performed by: STUDENT IN AN ORGANIZED HEALTH CARE EDUCATION/TRAINING PROGRAM

## 2022-10-06 PROCEDURE — 97530 THERAPEUTIC ACTIVITIES: CPT

## 2022-10-06 PROCEDURE — 97535 SELF CARE MNGMENT TRAINING: CPT

## 2022-10-06 RX ORDER — ACETAMINOPHEN 325 MG/1
650 TABLET ORAL EVERY 6 HOURS PRN
Status: DISCONTINUED | OUTPATIENT
Start: 2022-10-06 | End: 2022-11-08 | Stop reason: HOSPADM

## 2022-10-06 RX ADMIN — ACETAMINOPHEN 650 MG: 325 TABLET, FILM COATED ORAL at 10:17

## 2022-10-06 RX ADMIN — ESCITALOPRAM OXALATE 10 MG: 10 TABLET ORAL at 08:49

## 2022-10-06 RX ADMIN — RIVASTIGMINE TRANSDERMAL SYSTEM 1 PATCH: 4.6 PATCH, EXTENDED RELEASE TRANSDERMAL at 08:50

## 2022-10-06 RX ADMIN — PANTOPRAZOLE SODIUM 40 MG: 40 TABLET, DELAYED RELEASE ORAL at 08:50

## 2022-10-06 RX ADMIN — MELATONIN 6 MG: 3 TAB ORAL at 20:06

## 2022-10-06 RX ADMIN — AMLODIPINE BESYLATE 10 MG: 10 TABLET ORAL at 08:50

## 2022-10-06 RX ADMIN — PANTOPRAZOLE SODIUM 40 MG: 40 TABLET, DELAYED RELEASE ORAL at 20:06

## 2022-10-06 RX ADMIN — TIZANIDINE 4 MG: 4 TABLET ORAL at 20:06

## 2022-10-07 PROCEDURE — G0378 HOSPITAL OBSERVATION PER HR: HCPCS

## 2022-10-07 PROCEDURE — 97530 THERAPEUTIC ACTIVITIES: CPT

## 2022-10-07 PROCEDURE — 99225 PR SBSQ OBSERVATION CARE/DAY 25 MINUTES: CPT | Performed by: STUDENT IN AN ORGANIZED HEALTH CARE EDUCATION/TRAINING PROGRAM

## 2022-10-07 PROCEDURE — 97535 SELF CARE MNGMENT TRAINING: CPT

## 2022-10-07 PROCEDURE — 97116 GAIT TRAINING THERAPY: CPT

## 2022-10-07 RX ADMIN — PANTOPRAZOLE SODIUM 40 MG: 40 TABLET, DELAYED RELEASE ORAL at 08:05

## 2022-10-07 RX ADMIN — TIZANIDINE 4 MG: 4 TABLET ORAL at 11:23

## 2022-10-07 RX ADMIN — TIZANIDINE 4 MG: 4 TABLET ORAL at 20:50

## 2022-10-07 RX ADMIN — ACETAMINOPHEN 650 MG: 325 TABLET, FILM COATED ORAL at 16:53

## 2022-10-07 RX ADMIN — PANTOPRAZOLE SODIUM 40 MG: 40 TABLET, DELAYED RELEASE ORAL at 20:50

## 2022-10-07 RX ADMIN — CALCIUM CARBONATE (ANTACID) CHEW TAB 500 MG 2 TABLET: 500 CHEW TAB at 14:57

## 2022-10-07 RX ADMIN — MELATONIN 6 MG: 3 TAB ORAL at 20:50

## 2022-10-07 RX ADMIN — RIVASTIGMINE TRANSDERMAL SYSTEM 1 PATCH: 4.6 PATCH, EXTENDED RELEASE TRANSDERMAL at 08:05

## 2022-10-07 RX ADMIN — ESCITALOPRAM OXALATE 10 MG: 10 TABLET ORAL at 08:05

## 2022-10-07 RX ADMIN — AMLODIPINE BESYLATE 10 MG: 10 TABLET ORAL at 08:05

## 2022-10-08 ENCOUNTER — APPOINTMENT (OUTPATIENT)
Dept: CT IMAGING | Facility: HOSPITAL | Age: 62
End: 2022-10-08

## 2022-10-08 LAB
ALBUMIN SERPL-MCNC: 3.9 G/DL (ref 3.5–5.2)
ALBUMIN/GLOB SERPL: 1.5 G/DL
ALP SERPL-CCNC: 76 U/L (ref 39–117)
ALT SERPL W P-5'-P-CCNC: 11 U/L (ref 1–41)
ANION GAP SERPL CALCULATED.3IONS-SCNC: 8 MMOL/L (ref 5–15)
AST SERPL-CCNC: 10 U/L (ref 1–40)
BASOPHILS # BLD AUTO: 0.08 10*3/MM3 (ref 0–0.2)
BASOPHILS NFR BLD AUTO: 1.1 % (ref 0–1.5)
BILIRUB SERPL-MCNC: 0.4 MG/DL (ref 0–1.2)
BUN SERPL-MCNC: 25 MG/DL (ref 8–23)
BUN/CREAT SERPL: 26.6 (ref 7–25)
CALCIUM SPEC-SCNC: 9.4 MG/DL (ref 8.6–10.5)
CHLORIDE SERPL-SCNC: 105 MMOL/L (ref 98–107)
CHOLEST SERPL-MCNC: 164 MG/DL (ref 0–200)
CO2 SERPL-SCNC: 28 MMOL/L (ref 22–29)
CREAT SERPL-MCNC: 0.94 MG/DL (ref 0.76–1.27)
DEPRECATED RDW RBC AUTO: 43.6 FL (ref 37–54)
EGFRCR SERPLBLD CKD-EPI 2021: 92.2 ML/MIN/1.73
EOSINOPHIL # BLD AUTO: 0.15 10*3/MM3 (ref 0–0.4)
EOSINOPHIL NFR BLD AUTO: 2 % (ref 0.3–6.2)
ERYTHROCYTE [DISTWIDTH] IN BLOOD BY AUTOMATED COUNT: 12.5 % (ref 12.3–15.4)
GLOBULIN UR ELPH-MCNC: 2.6 GM/DL
GLUCOSE SERPL-MCNC: 85 MG/DL (ref 65–99)
HCT VFR BLD AUTO: 42.6 % (ref 37.5–51)
HDLC SERPL-MCNC: 32 MG/DL (ref 40–60)
HGB BLD-MCNC: 13.5 G/DL (ref 13–17.7)
IMM GRANULOCYTES # BLD AUTO: 0.02 10*3/MM3 (ref 0–0.05)
IMM GRANULOCYTES NFR BLD AUTO: 0.3 % (ref 0–0.5)
LDLC SERPL CALC-MCNC: 109 MG/DL (ref 0–100)
LDLC/HDLC SERPL: 3.32 {RATIO}
LYMPHOCYTES # BLD AUTO: 2.16 10*3/MM3 (ref 0.7–3.1)
LYMPHOCYTES NFR BLD AUTO: 29.5 % (ref 19.6–45.3)
MCH RBC QN AUTO: 29.8 PG (ref 26.6–33)
MCHC RBC AUTO-ENTMCNC: 31.7 G/DL (ref 31.5–35.7)
MCV RBC AUTO: 94 FL (ref 79–97)
MONOCYTES # BLD AUTO: 0.94 10*3/MM3 (ref 0.1–0.9)
MONOCYTES NFR BLD AUTO: 12.8 % (ref 5–12)
NEUTROPHILS NFR BLD AUTO: 3.98 10*3/MM3 (ref 1.7–7)
NEUTROPHILS NFR BLD AUTO: 54.3 % (ref 42.7–76)
NRBC BLD AUTO-RTO: 0 /100 WBC (ref 0–0.2)
PLATELET # BLD AUTO: 252 10*3/MM3 (ref 140–450)
PMV BLD AUTO: 9.8 FL (ref 6–12)
POTASSIUM SERPL-SCNC: 4.2 MMOL/L (ref 3.5–5.2)
PROT SERPL-MCNC: 6.5 G/DL (ref 6–8.5)
RBC # BLD AUTO: 4.53 10*6/MM3 (ref 4.14–5.8)
SODIUM SERPL-SCNC: 141 MMOL/L (ref 136–145)
TRIGL SERPL-MCNC: 129 MG/DL (ref 0–150)
VLDLC SERPL-MCNC: 23 MG/DL (ref 5–40)
WBC NRBC COR # BLD: 7.33 10*3/MM3 (ref 3.4–10.8)

## 2022-10-08 PROCEDURE — 97535 SELF CARE MNGMENT TRAINING: CPT

## 2022-10-08 PROCEDURE — 99225 PR SBSQ OBSERVATION CARE/DAY 25 MINUTES: CPT | Performed by: STUDENT IN AN ORGANIZED HEALTH CARE EDUCATION/TRAINING PROGRAM

## 2022-10-08 PROCEDURE — G0378 HOSPITAL OBSERVATION PER HR: HCPCS

## 2022-10-08 PROCEDURE — 70498 CT ANGIOGRAPHY NECK: CPT

## 2022-10-08 PROCEDURE — 70496 CT ANGIOGRAPHY HEAD: CPT

## 2022-10-08 PROCEDURE — 99214 OFFICE O/P EST MOD 30 MIN: CPT | Performed by: PSYCHIATRY & NEUROLOGY

## 2022-10-08 PROCEDURE — 80053 COMPREHEN METABOLIC PANEL: CPT | Performed by: STUDENT IN AN ORGANIZED HEALTH CARE EDUCATION/TRAINING PROGRAM

## 2022-10-08 PROCEDURE — 80061 LIPID PANEL: CPT | Performed by: STUDENT IN AN ORGANIZED HEALTH CARE EDUCATION/TRAINING PROGRAM

## 2022-10-08 PROCEDURE — 85025 COMPLETE CBC W/AUTO DIFF WBC: CPT | Performed by: STUDENT IN AN ORGANIZED HEALTH CARE EDUCATION/TRAINING PROGRAM

## 2022-10-08 PROCEDURE — 0 IOPAMIDOL PER 1 ML: Performed by: FAMILY MEDICINE

## 2022-10-08 PROCEDURE — 97116 GAIT TRAINING THERAPY: CPT

## 2022-10-08 RX ORDER — ROSUVASTATIN CALCIUM 20 MG/1
20 TABLET, COATED ORAL NIGHTLY
Status: DISCONTINUED | OUTPATIENT
Start: 2022-10-08 | End: 2022-11-08 | Stop reason: HOSPADM

## 2022-10-08 RX ORDER — LOPERAMIDE HYDROCHLORIDE 2 MG/1
2 CAPSULE ORAL ONCE AS NEEDED
Status: COMPLETED | OUTPATIENT
Start: 2022-10-08 | End: 2022-10-09

## 2022-10-08 RX ADMIN — ESCITALOPRAM OXALATE 10 MG: 10 TABLET ORAL at 08:57

## 2022-10-08 RX ADMIN — MELATONIN 6 MG: 3 TAB ORAL at 20:33

## 2022-10-08 RX ADMIN — ROSUVASTATIN CALCIUM 20 MG: 20 TABLET, FILM COATED ORAL at 20:33

## 2022-10-08 RX ADMIN — RIVASTIGMINE TRANSDERMAL SYSTEM 1 PATCH: 4.6 PATCH, EXTENDED RELEASE TRANSDERMAL at 08:57

## 2022-10-08 RX ADMIN — IOPAMIDOL 90 ML: 755 INJECTION, SOLUTION INTRAVENOUS at 12:09

## 2022-10-08 RX ADMIN — TIZANIDINE 4 MG: 4 TABLET ORAL at 20:33

## 2022-10-08 RX ADMIN — PANTOPRAZOLE SODIUM 40 MG: 40 TABLET, DELAYED RELEASE ORAL at 08:57

## 2022-10-08 RX ADMIN — AMLODIPINE BESYLATE 10 MG: 10 TABLET ORAL at 08:57

## 2022-10-08 RX ADMIN — PANTOPRAZOLE SODIUM 40 MG: 40 TABLET, DELAYED RELEASE ORAL at 20:33

## 2022-10-08 RX ADMIN — Medication 10 ML: at 20:33

## 2022-10-09 PROCEDURE — G0378 HOSPITAL OBSERVATION PER HR: HCPCS

## 2022-10-09 PROCEDURE — 99225 PR SBSQ OBSERVATION CARE/DAY 25 MINUTES: CPT | Performed by: STUDENT IN AN ORGANIZED HEALTH CARE EDUCATION/TRAINING PROGRAM

## 2022-10-09 RX ADMIN — LOPERAMIDE HYDROCHLORIDE 2 MG: 2 CAPSULE ORAL at 12:12

## 2022-10-09 RX ADMIN — PANTOPRAZOLE SODIUM 40 MG: 40 TABLET, DELAYED RELEASE ORAL at 08:46

## 2022-10-09 RX ADMIN — TIZANIDINE 4 MG: 4 TABLET ORAL at 21:36

## 2022-10-09 RX ADMIN — ESCITALOPRAM OXALATE 10 MG: 10 TABLET ORAL at 08:46

## 2022-10-09 RX ADMIN — MELATONIN 6 MG: 3 TAB ORAL at 21:36

## 2022-10-09 RX ADMIN — ROSUVASTATIN CALCIUM 20 MG: 20 TABLET, FILM COATED ORAL at 20:16

## 2022-10-09 RX ADMIN — RIVASTIGMINE TRANSDERMAL SYSTEM 1 PATCH: 4.6 PATCH, EXTENDED RELEASE TRANSDERMAL at 08:48

## 2022-10-09 RX ADMIN — Medication 10 ML: at 20:16

## 2022-10-09 RX ADMIN — ACETAMINOPHEN 650 MG: 325 TABLET, FILM COATED ORAL at 14:06

## 2022-10-09 RX ADMIN — PANTOPRAZOLE SODIUM 40 MG: 40 TABLET, DELAYED RELEASE ORAL at 20:16

## 2022-10-09 RX ADMIN — Medication 10 ML: at 08:46

## 2022-10-09 RX ADMIN — AMLODIPINE BESYLATE 10 MG: 10 TABLET ORAL at 08:46

## 2022-10-10 LAB
ALBUMIN SERPL-MCNC: 3.9 G/DL (ref 3.5–5.2)
ALBUMIN/GLOB SERPL: 1.4 G/DL
ALP SERPL-CCNC: 78 U/L (ref 39–117)
ALT SERPL W P-5'-P-CCNC: 11 U/L (ref 1–41)
ANION GAP SERPL CALCULATED.3IONS-SCNC: 9 MMOL/L (ref 5–15)
AST SERPL-CCNC: 11 U/L (ref 1–40)
BASOPHILS # BLD AUTO: 0.05 10*3/MM3 (ref 0–0.2)
BASOPHILS NFR BLD AUTO: 0.7 % (ref 0–1.5)
BILIRUB SERPL-MCNC: 0.3 MG/DL (ref 0–1.2)
BUN SERPL-MCNC: 14 MG/DL (ref 8–23)
BUN/CREAT SERPL: 15.1 (ref 7–25)
CALCIUM SPEC-SCNC: 9.3 MG/DL (ref 8.6–10.5)
CHLORIDE SERPL-SCNC: 106 MMOL/L (ref 98–107)
CO2 SERPL-SCNC: 26 MMOL/L (ref 22–29)
CREAT SERPL-MCNC: 0.93 MG/DL (ref 0.76–1.27)
DEPRECATED RDW RBC AUTO: 42.9 FL (ref 37–54)
EGFRCR SERPLBLD CKD-EPI 2021: 93.4 ML/MIN/1.73
EOSINOPHIL # BLD AUTO: 0.15 10*3/MM3 (ref 0–0.4)
EOSINOPHIL NFR BLD AUTO: 2 % (ref 0.3–6.2)
ERYTHROCYTE [DISTWIDTH] IN BLOOD BY AUTOMATED COUNT: 12.6 % (ref 12.3–15.4)
GLOBULIN UR ELPH-MCNC: 2.8 GM/DL
GLUCOSE SERPL-MCNC: 98 MG/DL (ref 65–99)
HCT VFR BLD AUTO: 40.9 % (ref 37.5–51)
HGB BLD-MCNC: 13.7 G/DL (ref 13–17.7)
IMM GRANULOCYTES # BLD AUTO: 0.04 10*3/MM3 (ref 0–0.05)
IMM GRANULOCYTES NFR BLD AUTO: 0.5 % (ref 0–0.5)
LYMPHOCYTES # BLD AUTO: 2.14 10*3/MM3 (ref 0.7–3.1)
LYMPHOCYTES NFR BLD AUTO: 28.7 % (ref 19.6–45.3)
MCH RBC QN AUTO: 30.9 PG (ref 26.6–33)
MCHC RBC AUTO-ENTMCNC: 33.5 G/DL (ref 31.5–35.7)
MCV RBC AUTO: 92.3 FL (ref 79–97)
MONOCYTES # BLD AUTO: 0.83 10*3/MM3 (ref 0.1–0.9)
MONOCYTES NFR BLD AUTO: 11.1 % (ref 5–12)
NEUTROPHILS NFR BLD AUTO: 4.25 10*3/MM3 (ref 1.7–7)
NEUTROPHILS NFR BLD AUTO: 57 % (ref 42.7–76)
NRBC BLD AUTO-RTO: 0 /100 WBC (ref 0–0.2)
PLATELET # BLD AUTO: 245 10*3/MM3 (ref 140–450)
PMV BLD AUTO: 9.9 FL (ref 6–12)
POTASSIUM SERPL-SCNC: 4.3 MMOL/L (ref 3.5–5.2)
PROT SERPL-MCNC: 6.7 G/DL (ref 6–8.5)
RBC # BLD AUTO: 4.43 10*6/MM3 (ref 4.14–5.8)
SODIUM SERPL-SCNC: 141 MMOL/L (ref 136–145)
WBC NRBC COR # BLD: 7.46 10*3/MM3 (ref 3.4–10.8)

## 2022-10-10 PROCEDURE — 80053 COMPREHEN METABOLIC PANEL: CPT | Performed by: STUDENT IN AN ORGANIZED HEALTH CARE EDUCATION/TRAINING PROGRAM

## 2022-10-10 PROCEDURE — 97535 SELF CARE MNGMENT TRAINING: CPT

## 2022-10-10 PROCEDURE — 85025 COMPLETE CBC W/AUTO DIFF WBC: CPT | Performed by: STUDENT IN AN ORGANIZED HEALTH CARE EDUCATION/TRAINING PROGRAM

## 2022-10-10 PROCEDURE — G0378 HOSPITAL OBSERVATION PER HR: HCPCS

## 2022-10-10 PROCEDURE — 99225 PR SBSQ OBSERVATION CARE/DAY 25 MINUTES: CPT | Performed by: STUDENT IN AN ORGANIZED HEALTH CARE EDUCATION/TRAINING PROGRAM

## 2022-10-10 PROCEDURE — 97110 THERAPEUTIC EXERCISES: CPT

## 2022-10-10 RX ADMIN — TIZANIDINE 4 MG: 4 TABLET ORAL at 21:23

## 2022-10-10 RX ADMIN — ACETAMINOPHEN 650 MG: 325 TABLET, FILM COATED ORAL at 09:26

## 2022-10-10 RX ADMIN — ROSUVASTATIN CALCIUM 20 MG: 20 TABLET, FILM COATED ORAL at 21:23

## 2022-10-10 RX ADMIN — MELATONIN 6 MG: 3 TAB ORAL at 21:23

## 2022-10-10 RX ADMIN — RIVASTIGMINE TRANSDERMAL SYSTEM 1 PATCH: 4.6 PATCH, EXTENDED RELEASE TRANSDERMAL at 09:27

## 2022-10-10 RX ADMIN — ESCITALOPRAM OXALATE 10 MG: 10 TABLET ORAL at 09:26

## 2022-10-10 RX ADMIN — PANTOPRAZOLE SODIUM 40 MG: 40 TABLET, DELAYED RELEASE ORAL at 09:26

## 2022-10-10 RX ADMIN — Medication 10 ML: at 09:33

## 2022-10-10 RX ADMIN — PANTOPRAZOLE SODIUM 40 MG: 40 TABLET, DELAYED RELEASE ORAL at 21:23

## 2022-10-10 RX ADMIN — AMLODIPINE BESYLATE 10 MG: 10 TABLET ORAL at 09:27

## 2022-10-10 RX ADMIN — Medication 10 ML: at 21:23

## 2022-10-11 PROCEDURE — 99225 PR SBSQ OBSERVATION CARE/DAY 25 MINUTES: CPT | Performed by: STUDENT IN AN ORGANIZED HEALTH CARE EDUCATION/TRAINING PROGRAM

## 2022-10-11 PROCEDURE — G0378 HOSPITAL OBSERVATION PER HR: HCPCS

## 2022-10-11 RX ORDER — ASPIRIN 81 MG
1 TABLET,CHEWABLE ORAL 2 TIMES DAILY
Status: DISCONTINUED | OUTPATIENT
Start: 2022-10-11 | End: 2022-10-19

## 2022-10-11 RX ORDER — HYDROCORTISONE 25 MG/G
CREAM TOPICAL 2 TIMES DAILY
Status: DISCONTINUED | OUTPATIENT
Start: 2022-10-11 | End: 2022-10-25

## 2022-10-11 RX ADMIN — CAPSAICIN 1 APPLICATION: 0 CREAM TOPICAL at 21:18

## 2022-10-11 RX ADMIN — ESCITALOPRAM OXALATE 10 MG: 10 TABLET ORAL at 09:11

## 2022-10-11 RX ADMIN — Medication 10 ML: at 21:18

## 2022-10-11 RX ADMIN — HYDROCORTISONE 2.5%: 25 CREAM TOPICAL at 11:25

## 2022-10-11 RX ADMIN — Medication 10 ML: at 09:13

## 2022-10-11 RX ADMIN — AMLODIPINE BESYLATE 10 MG: 10 TABLET ORAL at 09:11

## 2022-10-11 RX ADMIN — HYDROCORTISONE 2.5%: 25 CREAM TOPICAL at 21:17

## 2022-10-11 RX ADMIN — ROSUVASTATIN CALCIUM 20 MG: 20 TABLET, FILM COATED ORAL at 21:18

## 2022-10-11 RX ADMIN — RIVASTIGMINE TRANSDERMAL SYSTEM 1 PATCH: 4.6 PATCH, EXTENDED RELEASE TRANSDERMAL at 09:12

## 2022-10-11 RX ADMIN — CAPSAICIN 1 APPLICATION: 0 CREAM TOPICAL at 11:24

## 2022-10-11 RX ADMIN — MELATONIN 6 MG: 3 TAB ORAL at 21:18

## 2022-10-11 RX ADMIN — PANTOPRAZOLE SODIUM 40 MG: 40 TABLET, DELAYED RELEASE ORAL at 09:11

## 2022-10-11 RX ADMIN — PANTOPRAZOLE SODIUM 40 MG: 40 TABLET, DELAYED RELEASE ORAL at 21:18

## 2022-10-11 RX ADMIN — ACETAMINOPHEN 650 MG: 325 TABLET, FILM COATED ORAL at 17:56

## 2022-10-12 ENCOUNTER — APPOINTMENT (OUTPATIENT)
Dept: GENERAL RADIOLOGY | Facility: HOSPITAL | Age: 62
End: 2022-10-12

## 2022-10-12 LAB
ALBUMIN SERPL-MCNC: 3.9 G/DL (ref 3.5–5.2)
ALBUMIN/GLOB SERPL: 1.4 G/DL
ALP SERPL-CCNC: 77 U/L (ref 39–117)
ALT SERPL W P-5'-P-CCNC: 9 U/L (ref 1–41)
ANION GAP SERPL CALCULATED.3IONS-SCNC: 8 MMOL/L (ref 5–15)
AST SERPL-CCNC: 10 U/L (ref 1–40)
BASOPHILS # BLD AUTO: 0.07 10*3/MM3 (ref 0–0.2)
BASOPHILS NFR BLD AUTO: 1 % (ref 0–1.5)
BILIRUB SERPL-MCNC: 0.3 MG/DL (ref 0–1.2)
BUN SERPL-MCNC: 15 MG/DL (ref 8–23)
BUN/CREAT SERPL: 16 (ref 7–25)
CALCIUM SPEC-SCNC: 9.2 MG/DL (ref 8.6–10.5)
CHLORIDE SERPL-SCNC: 105 MMOL/L (ref 98–107)
CO2 SERPL-SCNC: 27 MMOL/L (ref 22–29)
CREAT SERPL-MCNC: 0.94 MG/DL (ref 0.76–1.27)
DEPRECATED RDW RBC AUTO: 42.2 FL (ref 37–54)
EGFRCR SERPLBLD CKD-EPI 2021: 92.2 ML/MIN/1.73
EOSINOPHIL # BLD AUTO: 0.18 10*3/MM3 (ref 0–0.4)
EOSINOPHIL NFR BLD AUTO: 2.5 % (ref 0.3–6.2)
ERYTHROCYTE [DISTWIDTH] IN BLOOD BY AUTOMATED COUNT: 12.5 % (ref 12.3–15.4)
GLOBULIN UR ELPH-MCNC: 2.8 GM/DL
GLUCOSE SERPL-MCNC: 96 MG/DL (ref 65–99)
HCT VFR BLD AUTO: 42.8 % (ref 37.5–51)
HGB BLD-MCNC: 14 G/DL (ref 13–17.7)
IMM GRANULOCYTES # BLD AUTO: 0.01 10*3/MM3 (ref 0–0.05)
IMM GRANULOCYTES NFR BLD AUTO: 0.1 % (ref 0–0.5)
LYMPHOCYTES # BLD AUTO: 2.04 10*3/MM3 (ref 0.7–3.1)
LYMPHOCYTES NFR BLD AUTO: 28 % (ref 19.6–45.3)
MCH RBC QN AUTO: 30.1 PG (ref 26.6–33)
MCHC RBC AUTO-ENTMCNC: 32.7 G/DL (ref 31.5–35.7)
MCV RBC AUTO: 92 FL (ref 79–97)
MONOCYTES # BLD AUTO: 0.75 10*3/MM3 (ref 0.1–0.9)
MONOCYTES NFR BLD AUTO: 10.3 % (ref 5–12)
NEUTROPHILS NFR BLD AUTO: 4.24 10*3/MM3 (ref 1.7–7)
NEUTROPHILS NFR BLD AUTO: 58.1 % (ref 42.7–76)
NRBC BLD AUTO-RTO: 0 /100 WBC (ref 0–0.2)
PLATELET # BLD AUTO: 231 10*3/MM3 (ref 140–450)
PMV BLD AUTO: 10 FL (ref 6–12)
POTASSIUM SERPL-SCNC: 4.2 MMOL/L (ref 3.5–5.2)
PROT SERPL-MCNC: 6.7 G/DL (ref 6–8.5)
RBC # BLD AUTO: 4.65 10*6/MM3 (ref 4.14–5.8)
SODIUM SERPL-SCNC: 140 MMOL/L (ref 136–145)
WBC NRBC COR # BLD: 7.29 10*3/MM3 (ref 3.4–10.8)

## 2022-10-12 PROCEDURE — 97530 THERAPEUTIC ACTIVITIES: CPT

## 2022-10-12 PROCEDURE — 97116 GAIT TRAINING THERAPY: CPT

## 2022-10-12 PROCEDURE — 85025 COMPLETE CBC W/AUTO DIFF WBC: CPT | Performed by: STUDENT IN AN ORGANIZED HEALTH CARE EDUCATION/TRAINING PROGRAM

## 2022-10-12 PROCEDURE — 99225 PR SBSQ OBSERVATION CARE/DAY 25 MINUTES: CPT | Performed by: STUDENT IN AN ORGANIZED HEALTH CARE EDUCATION/TRAINING PROGRAM

## 2022-10-12 PROCEDURE — 73600 X-RAY EXAM OF ANKLE: CPT

## 2022-10-12 PROCEDURE — 97110 THERAPEUTIC EXERCISES: CPT

## 2022-10-12 PROCEDURE — G0378 HOSPITAL OBSERVATION PER HR: HCPCS

## 2022-10-12 PROCEDURE — 80053 COMPREHEN METABOLIC PANEL: CPT | Performed by: STUDENT IN AN ORGANIZED HEALTH CARE EDUCATION/TRAINING PROGRAM

## 2022-10-12 RX ADMIN — ACETAMINOPHEN 650 MG: 325 TABLET, FILM COATED ORAL at 09:01

## 2022-10-12 RX ADMIN — CAPSAICIN 1 APPLICATION: 0 CREAM TOPICAL at 20:17

## 2022-10-12 RX ADMIN — PANTOPRAZOLE SODIUM 40 MG: 40 TABLET, DELAYED RELEASE ORAL at 20:15

## 2022-10-12 RX ADMIN — HYDROCORTISONE 2.5%: 25 CREAM TOPICAL at 20:18

## 2022-10-12 RX ADMIN — AMLODIPINE BESYLATE 10 MG: 10 TABLET ORAL at 09:01

## 2022-10-12 RX ADMIN — Medication 10 ML: at 20:19

## 2022-10-12 RX ADMIN — ESCITALOPRAM OXALATE 10 MG: 10 TABLET ORAL at 09:01

## 2022-10-12 RX ADMIN — ROSUVASTATIN CALCIUM 20 MG: 20 TABLET, FILM COATED ORAL at 20:15

## 2022-10-12 RX ADMIN — RIVASTIGMINE TRANSDERMAL SYSTEM 1 PATCH: 4.6 PATCH, EXTENDED RELEASE TRANSDERMAL at 09:01

## 2022-10-12 RX ADMIN — ACETAMINOPHEN 650 MG: 325 TABLET, FILM COATED ORAL at 18:39

## 2022-10-12 RX ADMIN — MELATONIN 6 MG: 3 TAB ORAL at 20:21

## 2022-10-12 RX ADMIN — PANTOPRAZOLE SODIUM 40 MG: 40 TABLET, DELAYED RELEASE ORAL at 09:01

## 2022-10-13 PROCEDURE — 97116 GAIT TRAINING THERAPY: CPT

## 2022-10-13 PROCEDURE — 99225 PR SBSQ OBSERVATION CARE/DAY 25 MINUTES: CPT | Performed by: STUDENT IN AN ORGANIZED HEALTH CARE EDUCATION/TRAINING PROGRAM

## 2022-10-13 PROCEDURE — G0378 HOSPITAL OBSERVATION PER HR: HCPCS

## 2022-10-13 RX ADMIN — CAPSAICIN 1 APPLICATION: 0 CREAM TOPICAL at 08:09

## 2022-10-13 RX ADMIN — ACETAMINOPHEN 650 MG: 325 TABLET, FILM COATED ORAL at 10:08

## 2022-10-13 RX ADMIN — HYDROCORTISONE 2.5%: 25 CREAM TOPICAL at 08:09

## 2022-10-13 RX ADMIN — PANTOPRAZOLE SODIUM 40 MG: 40 TABLET, DELAYED RELEASE ORAL at 08:04

## 2022-10-13 RX ADMIN — RIVASTIGMINE TRANSDERMAL SYSTEM 1 PATCH: 4.6 PATCH, EXTENDED RELEASE TRANSDERMAL at 08:04

## 2022-10-13 RX ADMIN — AMLODIPINE BESYLATE 10 MG: 10 TABLET ORAL at 08:04

## 2022-10-13 RX ADMIN — MELATONIN 6 MG: 3 TAB ORAL at 21:12

## 2022-10-13 RX ADMIN — ROSUVASTATIN CALCIUM 20 MG: 20 TABLET, FILM COATED ORAL at 21:13

## 2022-10-13 RX ADMIN — ACETAMINOPHEN 650 MG: 325 TABLET, FILM COATED ORAL at 17:15

## 2022-10-13 RX ADMIN — ESCITALOPRAM OXALATE 10 MG: 10 TABLET ORAL at 08:04

## 2022-10-13 RX ADMIN — PANTOPRAZOLE SODIUM 40 MG: 40 TABLET, DELAYED RELEASE ORAL at 21:12

## 2022-10-14 PROBLEM — Z02.9 DISCHARGE PLANNING ISSUES: Status: ACTIVE | Noted: 2022-10-14

## 2022-10-14 LAB
ALBUMIN SERPL-MCNC: 3.8 G/DL (ref 3.5–5.2)
ALBUMIN/GLOB SERPL: 1.4 G/DL
ALP SERPL-CCNC: 71 U/L (ref 39–117)
ALT SERPL W P-5'-P-CCNC: 12 U/L (ref 1–41)
ANION GAP SERPL CALCULATED.3IONS-SCNC: 8 MMOL/L (ref 5–15)
AST SERPL-CCNC: 11 U/L (ref 1–40)
BASOPHILS # BLD AUTO: 0.07 10*3/MM3 (ref 0–0.2)
BASOPHILS NFR BLD AUTO: 0.9 % (ref 0–1.5)
BILIRUB SERPL-MCNC: 0.3 MG/DL (ref 0–1.2)
BUN SERPL-MCNC: 17 MG/DL (ref 8–23)
BUN/CREAT SERPL: 17.9 (ref 7–25)
CALCIUM SPEC-SCNC: 9.1 MG/DL (ref 8.6–10.5)
CHLORIDE SERPL-SCNC: 106 MMOL/L (ref 98–107)
CO2 SERPL-SCNC: 28 MMOL/L (ref 22–29)
CREAT SERPL-MCNC: 0.95 MG/DL (ref 0.76–1.27)
DEPRECATED RDW RBC AUTO: 42.1 FL (ref 37–54)
EGFRCR SERPLBLD CKD-EPI 2021: 91.1 ML/MIN/1.73
EOSINOPHIL # BLD AUTO: 0.15 10*3/MM3 (ref 0–0.4)
EOSINOPHIL NFR BLD AUTO: 1.8 % (ref 0.3–6.2)
ERYTHROCYTE [DISTWIDTH] IN BLOOD BY AUTOMATED COUNT: 12.3 % (ref 12.3–15.4)
GLOBULIN UR ELPH-MCNC: 2.7 GM/DL
GLUCOSE SERPL-MCNC: 94 MG/DL (ref 65–99)
HCT VFR BLD AUTO: 42.4 % (ref 37.5–51)
HGB BLD-MCNC: 13.7 G/DL (ref 13–17.7)
IMM GRANULOCYTES # BLD AUTO: 0.02 10*3/MM3 (ref 0–0.05)
IMM GRANULOCYTES NFR BLD AUTO: 0.2 % (ref 0–0.5)
LYMPHOCYTES # BLD AUTO: 2.19 10*3/MM3 (ref 0.7–3.1)
LYMPHOCYTES NFR BLD AUTO: 26.8 % (ref 19.6–45.3)
MCH RBC QN AUTO: 30.2 PG (ref 26.6–33)
MCHC RBC AUTO-ENTMCNC: 32.3 G/DL (ref 31.5–35.7)
MCV RBC AUTO: 93.4 FL (ref 79–97)
MONOCYTES # BLD AUTO: 0.81 10*3/MM3 (ref 0.1–0.9)
MONOCYTES NFR BLD AUTO: 9.9 % (ref 5–12)
NEUTROPHILS NFR BLD AUTO: 4.92 10*3/MM3 (ref 1.7–7)
NEUTROPHILS NFR BLD AUTO: 60.4 % (ref 42.7–76)
NRBC BLD AUTO-RTO: 0 /100 WBC (ref 0–0.2)
PLATELET # BLD AUTO: 231 10*3/MM3 (ref 140–450)
PMV BLD AUTO: 9.8 FL (ref 6–12)
POTASSIUM SERPL-SCNC: 4.4 MMOL/L (ref 3.5–5.2)
PROT SERPL-MCNC: 6.5 G/DL (ref 6–8.5)
RBC # BLD AUTO: 4.54 10*6/MM3 (ref 4.14–5.8)
SODIUM SERPL-SCNC: 142 MMOL/L (ref 136–145)
WBC NRBC COR # BLD: 8.16 10*3/MM3 (ref 3.4–10.8)

## 2022-10-14 PROCEDURE — 97530 THERAPEUTIC ACTIVITIES: CPT

## 2022-10-14 PROCEDURE — G0378 HOSPITAL OBSERVATION PER HR: HCPCS

## 2022-10-14 PROCEDURE — 80053 COMPREHEN METABOLIC PANEL: CPT | Performed by: STUDENT IN AN ORGANIZED HEALTH CARE EDUCATION/TRAINING PROGRAM

## 2022-10-14 PROCEDURE — 85025 COMPLETE CBC W/AUTO DIFF WBC: CPT | Performed by: STUDENT IN AN ORGANIZED HEALTH CARE EDUCATION/TRAINING PROGRAM

## 2022-10-14 PROCEDURE — 99225 PR SBSQ OBSERVATION CARE/DAY 25 MINUTES: CPT | Performed by: FAMILY MEDICINE

## 2022-10-14 RX ADMIN — HYDROCORTISONE 2.5%: 25 CREAM TOPICAL at 20:10

## 2022-10-14 RX ADMIN — HYDROCORTISONE 2.5%: 25 CREAM TOPICAL at 08:54

## 2022-10-14 RX ADMIN — PANTOPRAZOLE SODIUM 40 MG: 40 TABLET, DELAYED RELEASE ORAL at 08:52

## 2022-10-14 RX ADMIN — PANTOPRAZOLE SODIUM 40 MG: 40 TABLET, DELAYED RELEASE ORAL at 20:10

## 2022-10-14 RX ADMIN — Medication 10 ML: at 08:53

## 2022-10-14 RX ADMIN — ESCITALOPRAM OXALATE 10 MG: 10 TABLET ORAL at 08:52

## 2022-10-14 RX ADMIN — AMLODIPINE BESYLATE 10 MG: 10 TABLET ORAL at 08:52

## 2022-10-14 RX ADMIN — MELATONIN 6 MG: 3 TAB ORAL at 20:14

## 2022-10-14 RX ADMIN — RIVASTIGMINE TRANSDERMAL SYSTEM 1 PATCH: 4.6 PATCH, EXTENDED RELEASE TRANSDERMAL at 08:52

## 2022-10-14 RX ADMIN — ACETAMINOPHEN 650 MG: 325 TABLET, FILM COATED ORAL at 09:53

## 2022-10-14 RX ADMIN — CAPSAICIN 1 APPLICATION: 0 CREAM TOPICAL at 08:54

## 2022-10-14 RX ADMIN — ROSUVASTATIN CALCIUM 20 MG: 20 TABLET, FILM COATED ORAL at 20:10

## 2022-10-15 PROCEDURE — G0378 HOSPITAL OBSERVATION PER HR: HCPCS

## 2022-10-15 PROCEDURE — 99225 PR SBSQ OBSERVATION CARE/DAY 25 MINUTES: CPT | Performed by: STUDENT IN AN ORGANIZED HEALTH CARE EDUCATION/TRAINING PROGRAM

## 2022-10-15 PROCEDURE — 97116 GAIT TRAINING THERAPY: CPT

## 2022-10-15 RX ORDER — LOPERAMIDE HYDROCHLORIDE 2 MG/1
2 CAPSULE ORAL 4 TIMES DAILY PRN
Status: DISCONTINUED | OUTPATIENT
Start: 2022-10-15 | End: 2022-10-19

## 2022-10-15 RX ADMIN — PANTOPRAZOLE SODIUM 40 MG: 40 TABLET, DELAYED RELEASE ORAL at 08:45

## 2022-10-15 RX ADMIN — CAPSAICIN 1 APPLICATION: 0 CREAM TOPICAL at 20:46

## 2022-10-15 RX ADMIN — HYDROCORTISONE 2.5%: 25 CREAM TOPICAL at 20:46

## 2022-10-15 RX ADMIN — MELATONIN 6 MG: 3 TAB ORAL at 20:46

## 2022-10-15 RX ADMIN — ROSUVASTATIN CALCIUM 20 MG: 20 TABLET, FILM COATED ORAL at 20:46

## 2022-10-15 RX ADMIN — ESCITALOPRAM OXALATE 10 MG: 10 TABLET ORAL at 08:45

## 2022-10-15 RX ADMIN — LOPERAMIDE HYDROCHLORIDE 2 MG: 2 CAPSULE ORAL at 11:21

## 2022-10-15 RX ADMIN — CAPSAICIN 1 APPLICATION: 0 CREAM TOPICAL at 08:46

## 2022-10-15 RX ADMIN — PANTOPRAZOLE SODIUM 40 MG: 40 TABLET, DELAYED RELEASE ORAL at 20:46

## 2022-10-15 RX ADMIN — RIVASTIGMINE TRANSDERMAL SYSTEM 1 PATCH: 4.6 PATCH, EXTENDED RELEASE TRANSDERMAL at 08:44

## 2022-10-15 RX ADMIN — AMLODIPINE BESYLATE 10 MG: 10 TABLET ORAL at 08:45

## 2022-10-15 RX ADMIN — HYDROCORTISONE 2.5%: 25 CREAM TOPICAL at 08:46

## 2022-10-16 LAB
ALBUMIN SERPL-MCNC: 3.8 G/DL (ref 3.5–5.2)
ALBUMIN/GLOB SERPL: 1.5 G/DL
ALP SERPL-CCNC: 70 U/L (ref 39–117)
ALT SERPL W P-5'-P-CCNC: 14 U/L (ref 1–41)
ANION GAP SERPL CALCULATED.3IONS-SCNC: 8 MMOL/L (ref 5–15)
AST SERPL-CCNC: 12 U/L (ref 1–40)
BASOPHILS # BLD AUTO: 0.07 10*3/MM3 (ref 0–0.2)
BASOPHILS NFR BLD AUTO: 0.8 % (ref 0–1.5)
BILIRUB SERPL-MCNC: 0.3 MG/DL (ref 0–1.2)
BUN SERPL-MCNC: 15 MG/DL (ref 8–23)
BUN/CREAT SERPL: 16 (ref 7–25)
CALCIUM SPEC-SCNC: 9 MG/DL (ref 8.6–10.5)
CHLORIDE SERPL-SCNC: 106 MMOL/L (ref 98–107)
CO2 SERPL-SCNC: 27 MMOL/L (ref 22–29)
CREAT SERPL-MCNC: 0.94 MG/DL (ref 0.76–1.27)
DEPRECATED RDW RBC AUTO: 41.9 FL (ref 37–54)
EGFRCR SERPLBLD CKD-EPI 2021: 92.2 ML/MIN/1.73
EOSINOPHIL # BLD AUTO: 0.2 10*3/MM3 (ref 0–0.4)
EOSINOPHIL NFR BLD AUTO: 2.4 % (ref 0.3–6.2)
ERYTHROCYTE [DISTWIDTH] IN BLOOD BY AUTOMATED COUNT: 12.1 % (ref 12.3–15.4)
GLOBULIN UR ELPH-MCNC: 2.6 GM/DL
GLUCOSE SERPL-MCNC: 88 MG/DL (ref 65–99)
HCT VFR BLD AUTO: 40.8 % (ref 37.5–51)
HGB BLD-MCNC: 13 G/DL (ref 13–17.7)
IMM GRANULOCYTES # BLD AUTO: 0.02 10*3/MM3 (ref 0–0.05)
IMM GRANULOCYTES NFR BLD AUTO: 0.2 % (ref 0–0.5)
LYMPHOCYTES # BLD AUTO: 2.32 10*3/MM3 (ref 0.7–3.1)
LYMPHOCYTES NFR BLD AUTO: 27.5 % (ref 19.6–45.3)
MCH RBC QN AUTO: 29.5 PG (ref 26.6–33)
MCHC RBC AUTO-ENTMCNC: 31.9 G/DL (ref 31.5–35.7)
MCV RBC AUTO: 92.5 FL (ref 79–97)
MONOCYTES # BLD AUTO: 0.93 10*3/MM3 (ref 0.1–0.9)
MONOCYTES NFR BLD AUTO: 11 % (ref 5–12)
NEUTROPHILS NFR BLD AUTO: 4.9 10*3/MM3 (ref 1.7–7)
NEUTROPHILS NFR BLD AUTO: 58.1 % (ref 42.7–76)
NRBC BLD AUTO-RTO: 0 /100 WBC (ref 0–0.2)
PLATELET # BLD AUTO: 224 10*3/MM3 (ref 140–450)
PMV BLD AUTO: 9.6 FL (ref 6–12)
POTASSIUM SERPL-SCNC: 4.1 MMOL/L (ref 3.5–5.2)
PROT SERPL-MCNC: 6.4 G/DL (ref 6–8.5)
RBC # BLD AUTO: 4.41 10*6/MM3 (ref 4.14–5.8)
SODIUM SERPL-SCNC: 141 MMOL/L (ref 136–145)
WBC NRBC COR # BLD: 8.44 10*3/MM3 (ref 3.4–10.8)

## 2022-10-16 PROCEDURE — 99225 PR SBSQ OBSERVATION CARE/DAY 25 MINUTES: CPT | Performed by: STUDENT IN AN ORGANIZED HEALTH CARE EDUCATION/TRAINING PROGRAM

## 2022-10-16 PROCEDURE — 85025 COMPLETE CBC W/AUTO DIFF WBC: CPT | Performed by: STUDENT IN AN ORGANIZED HEALTH CARE EDUCATION/TRAINING PROGRAM

## 2022-10-16 PROCEDURE — 80053 COMPREHEN METABOLIC PANEL: CPT | Performed by: STUDENT IN AN ORGANIZED HEALTH CARE EDUCATION/TRAINING PROGRAM

## 2022-10-16 PROCEDURE — G0378 HOSPITAL OBSERVATION PER HR: HCPCS

## 2022-10-16 RX ADMIN — AMLODIPINE BESYLATE 10 MG: 10 TABLET ORAL at 09:02

## 2022-10-16 RX ADMIN — ROSUVASTATIN CALCIUM 20 MG: 20 TABLET, FILM COATED ORAL at 21:25

## 2022-10-16 RX ADMIN — PANTOPRAZOLE SODIUM 40 MG: 40 TABLET, DELAYED RELEASE ORAL at 21:25

## 2022-10-16 RX ADMIN — CAPSAICIN 1 APPLICATION: 0 CREAM TOPICAL at 08:13

## 2022-10-16 RX ADMIN — ESCITALOPRAM OXALATE 10 MG: 10 TABLET ORAL at 08:12

## 2022-10-16 RX ADMIN — RIVASTIGMINE TRANSDERMAL SYSTEM 1 PATCH: 4.6 PATCH, EXTENDED RELEASE TRANSDERMAL at 08:12

## 2022-10-16 RX ADMIN — PANTOPRAZOLE SODIUM 40 MG: 40 TABLET, DELAYED RELEASE ORAL at 08:12

## 2022-10-16 RX ADMIN — MELATONIN 6 MG: 3 TAB ORAL at 21:26

## 2022-10-17 PROCEDURE — G0378 HOSPITAL OBSERVATION PER HR: HCPCS

## 2022-10-17 PROCEDURE — 97116 GAIT TRAINING THERAPY: CPT

## 2022-10-17 PROCEDURE — 99225 PR SBSQ OBSERVATION CARE/DAY 25 MINUTES: CPT | Performed by: STUDENT IN AN ORGANIZED HEALTH CARE EDUCATION/TRAINING PROGRAM

## 2022-10-17 RX ADMIN — HYDROCORTISONE 2.5%: 25 CREAM TOPICAL at 12:10

## 2022-10-17 RX ADMIN — ESCITALOPRAM OXALATE 10 MG: 10 TABLET ORAL at 08:45

## 2022-10-17 RX ADMIN — ROSUVASTATIN CALCIUM 20 MG: 20 TABLET, FILM COATED ORAL at 20:20

## 2022-10-17 RX ADMIN — AMLODIPINE BESYLATE 10 MG: 10 TABLET ORAL at 08:45

## 2022-10-17 RX ADMIN — CAPSAICIN 1 APPLICATION: 0 CREAM TOPICAL at 08:47

## 2022-10-17 RX ADMIN — RIVASTIGMINE TRANSDERMAL SYSTEM 1 PATCH: 4.6 PATCH, EXTENDED RELEASE TRANSDERMAL at 08:46

## 2022-10-17 RX ADMIN — MELATONIN 6 MG: 3 TAB ORAL at 20:19

## 2022-10-17 RX ADMIN — PANTOPRAZOLE SODIUM 40 MG: 40 TABLET, DELAYED RELEASE ORAL at 20:20

## 2022-10-17 RX ADMIN — PANTOPRAZOLE SODIUM 40 MG: 40 TABLET, DELAYED RELEASE ORAL at 08:45

## 2022-10-17 RX ADMIN — ACETAMINOPHEN 650 MG: 325 TABLET, FILM COATED ORAL at 12:10

## 2022-10-18 LAB
ALBUMIN SERPL-MCNC: 3.8 G/DL (ref 3.5–5.2)
ALBUMIN/GLOB SERPL: 1.4 G/DL
ALP SERPL-CCNC: 76 U/L (ref 39–117)
ALT SERPL W P-5'-P-CCNC: 17 U/L (ref 1–41)
ANION GAP SERPL CALCULATED.3IONS-SCNC: 7 MMOL/L (ref 5–15)
AST SERPL-CCNC: 13 U/L (ref 1–40)
BASOPHILS # BLD AUTO: 0.08 10*3/MM3 (ref 0–0.2)
BASOPHILS NFR BLD AUTO: 0.9 % (ref 0–1.5)
BILIRUB SERPL-MCNC: 0.3 MG/DL (ref 0–1.2)
BUN SERPL-MCNC: 17 MG/DL (ref 8–23)
BUN/CREAT SERPL: 19.1 (ref 7–25)
CALCIUM SPEC-SCNC: 9 MG/DL (ref 8.6–10.5)
CHLORIDE SERPL-SCNC: 106 MMOL/L (ref 98–107)
CO2 SERPL-SCNC: 27 MMOL/L (ref 22–29)
CREAT SERPL-MCNC: 0.89 MG/DL (ref 0.76–1.27)
DEPRECATED RDW RBC AUTO: 40.4 FL (ref 37–54)
EGFRCR SERPLBLD CKD-EPI 2021: 97.5 ML/MIN/1.73
EOSINOPHIL # BLD AUTO: 0.2 10*3/MM3 (ref 0–0.4)
EOSINOPHIL NFR BLD AUTO: 2.4 % (ref 0.3–6.2)
ERYTHROCYTE [DISTWIDTH] IN BLOOD BY AUTOMATED COUNT: 12.4 % (ref 12.3–15.4)
GLOBULIN UR ELPH-MCNC: 2.7 GM/DL
GLUCOSE SERPL-MCNC: 87 MG/DL (ref 65–99)
HCT VFR BLD AUTO: 38.3 % (ref 37.5–51)
HGB BLD-MCNC: 12.9 G/DL (ref 13–17.7)
IMM GRANULOCYTES # BLD AUTO: 0.03 10*3/MM3 (ref 0–0.05)
IMM GRANULOCYTES NFR BLD AUTO: 0.4 % (ref 0–0.5)
LYMPHOCYTES # BLD AUTO: 2.21 10*3/MM3 (ref 0.7–3.1)
LYMPHOCYTES NFR BLD AUTO: 26.2 % (ref 19.6–45.3)
MCH RBC QN AUTO: 30.4 PG (ref 26.6–33)
MCHC RBC AUTO-ENTMCNC: 33.7 G/DL (ref 31.5–35.7)
MCV RBC AUTO: 90.3 FL (ref 79–97)
MONOCYTES # BLD AUTO: 0.88 10*3/MM3 (ref 0.1–0.9)
MONOCYTES NFR BLD AUTO: 10.4 % (ref 5–12)
NEUTROPHILS NFR BLD AUTO: 5.03 10*3/MM3 (ref 1.7–7)
NEUTROPHILS NFR BLD AUTO: 59.7 % (ref 42.7–76)
NRBC BLD AUTO-RTO: 0 /100 WBC (ref 0–0.2)
PLATELET # BLD AUTO: 292 10*3/MM3 (ref 140–450)
PMV BLD AUTO: 11 FL (ref 6–12)
POTASSIUM SERPL-SCNC: 4.1 MMOL/L (ref 3.5–5.2)
PROT SERPL-MCNC: 6.5 G/DL (ref 6–8.5)
RBC # BLD AUTO: 4.24 10*6/MM3 (ref 4.14–5.8)
SODIUM SERPL-SCNC: 140 MMOL/L (ref 136–145)
WBC NRBC COR # BLD: 8.43 10*3/MM3 (ref 3.4–10.8)

## 2022-10-18 PROCEDURE — 99225 PR SBSQ OBSERVATION CARE/DAY 25 MINUTES: CPT | Performed by: STUDENT IN AN ORGANIZED HEALTH CARE EDUCATION/TRAINING PROGRAM

## 2022-10-18 PROCEDURE — 80053 COMPREHEN METABOLIC PANEL: CPT | Performed by: STUDENT IN AN ORGANIZED HEALTH CARE EDUCATION/TRAINING PROGRAM

## 2022-10-18 PROCEDURE — 85025 COMPLETE CBC W/AUTO DIFF WBC: CPT | Performed by: STUDENT IN AN ORGANIZED HEALTH CARE EDUCATION/TRAINING PROGRAM

## 2022-10-18 PROCEDURE — 97530 THERAPEUTIC ACTIVITIES: CPT

## 2022-10-18 PROCEDURE — G0378 HOSPITAL OBSERVATION PER HR: HCPCS

## 2022-10-18 PROCEDURE — 97116 GAIT TRAINING THERAPY: CPT

## 2022-10-18 RX ORDER — POLYETHYLENE GLYCOL 3350 17 G/17G
17 POWDER, FOR SOLUTION ORAL DAILY
Status: DISCONTINUED | OUTPATIENT
Start: 2022-10-18 | End: 2022-10-19

## 2022-10-18 RX ORDER — DOCUSATE SODIUM 100 MG/1
100 CAPSULE, LIQUID FILLED ORAL 2 TIMES DAILY
Status: DISCONTINUED | OUTPATIENT
Start: 2022-10-19 | End: 2022-10-19

## 2022-10-18 RX ORDER — DIAPER,BRIEF,INFANT-TODD,DISP
1 EACH MISCELLANEOUS EVERY 12 HOURS SCHEDULED
Status: COMPLETED | OUTPATIENT
Start: 2022-10-18 | End: 2022-10-21

## 2022-10-18 RX ADMIN — PANTOPRAZOLE SODIUM 40 MG: 40 TABLET, DELAYED RELEASE ORAL at 08:29

## 2022-10-18 RX ADMIN — HYDROCORTISONE 1 APPLICATION: 1 CREAM TOPICAL at 21:00

## 2022-10-18 RX ADMIN — ROSUVASTATIN CALCIUM 20 MG: 20 TABLET, FILM COATED ORAL at 20:11

## 2022-10-18 RX ADMIN — AMLODIPINE BESYLATE 10 MG: 10 TABLET ORAL at 08:29

## 2022-10-18 RX ADMIN — HYDROCORTISONE 2.5%: 25 CREAM TOPICAL at 08:30

## 2022-10-18 RX ADMIN — ACETAMINOPHEN 650 MG: 325 TABLET, FILM COATED ORAL at 12:38

## 2022-10-18 RX ADMIN — RIVASTIGMINE TRANSDERMAL SYSTEM 1 PATCH: 4.6 PATCH, EXTENDED RELEASE TRANSDERMAL at 08:31

## 2022-10-18 RX ADMIN — ESCITALOPRAM OXALATE 10 MG: 10 TABLET ORAL at 08:29

## 2022-10-18 RX ADMIN — POLYETHYLENE GLYCOL 3350 17 G: 17 POWDER, FOR SOLUTION ORAL at 17:23

## 2022-10-18 RX ADMIN — MELATONIN 6 MG: 3 TAB ORAL at 20:12

## 2022-10-18 RX ADMIN — PANTOPRAZOLE SODIUM 40 MG: 40 TABLET, DELAYED RELEASE ORAL at 20:11

## 2022-10-18 RX ADMIN — HYDROCORTISONE 2.5%: 25 CREAM TOPICAL at 20:13

## 2022-10-19 PROCEDURE — G0378 HOSPITAL OBSERVATION PER HR: HCPCS

## 2022-10-19 PROCEDURE — 99225 PR SBSQ OBSERVATION CARE/DAY 25 MINUTES: CPT | Performed by: STUDENT IN AN ORGANIZED HEALTH CARE EDUCATION/TRAINING PROGRAM

## 2022-10-19 RX ORDER — CALCIUM CARBONATE 200(500)MG
2 TABLET,CHEWABLE ORAL AS NEEDED
Status: DISCONTINUED | OUTPATIENT
Start: 2022-10-19 | End: 2022-10-19

## 2022-10-19 RX ORDER — POLYETHYLENE GLYCOL 3350 17 G/17G
17 POWDER, FOR SOLUTION ORAL DAILY
Status: COMPLETED | OUTPATIENT
Start: 2022-10-19 | End: 2022-10-20

## 2022-10-19 RX ORDER — DOCUSATE SODIUM 100 MG/1
100 CAPSULE, LIQUID FILLED ORAL NIGHTLY
Status: DISCONTINUED | OUTPATIENT
Start: 2022-10-19 | End: 2022-10-19

## 2022-10-19 RX ORDER — PANTOPRAZOLE SODIUM 40 MG/1
40 TABLET, DELAYED RELEASE ORAL
Status: DISCONTINUED | OUTPATIENT
Start: 2022-10-20 | End: 2022-11-08 | Stop reason: HOSPADM

## 2022-10-19 RX ORDER — CALCIUM CARBONATE 200(500)MG
1 TABLET,CHEWABLE ORAL 3 TIMES DAILY PRN
Status: DISCONTINUED | OUTPATIENT
Start: 2022-10-19 | End: 2022-11-08 | Stop reason: HOSPADM

## 2022-10-19 RX ADMIN — ACETAMINOPHEN 650 MG: 325 TABLET, FILM COATED ORAL at 13:03

## 2022-10-19 RX ADMIN — HYDROCORTISONE 2.5%: 25 CREAM TOPICAL at 20:31

## 2022-10-19 RX ADMIN — HYDROCORTISONE 1 APPLICATION: 1 CREAM TOPICAL at 08:36

## 2022-10-19 RX ADMIN — AMLODIPINE BESYLATE 10 MG: 10 TABLET ORAL at 08:34

## 2022-10-19 RX ADMIN — ESCITALOPRAM OXALATE 10 MG: 10 TABLET ORAL at 08:34

## 2022-10-19 RX ADMIN — HYDROCORTISONE 1 APPLICATION: 1 CREAM TOPICAL at 20:30

## 2022-10-19 RX ADMIN — ROSUVASTATIN CALCIUM 20 MG: 20 TABLET, FILM COATED ORAL at 20:30

## 2022-10-19 RX ADMIN — MELATONIN 6 MG: 3 TAB ORAL at 20:30

## 2022-10-19 RX ADMIN — RIVASTIGMINE TRANSDERMAL SYSTEM 1 PATCH: 4.6 PATCH, EXTENDED RELEASE TRANSDERMAL at 08:36

## 2022-10-19 RX ADMIN — POLYETHYLENE GLYCOL 3350 17 G: 17 POWDER, FOR SOLUTION ORAL at 08:34

## 2022-10-20 ENCOUNTER — APPOINTMENT (OUTPATIENT)
Dept: ULTRASOUND IMAGING | Facility: HOSPITAL | Age: 62
End: 2022-10-20

## 2022-10-20 PROCEDURE — 97110 THERAPEUTIC EXERCISES: CPT

## 2022-10-20 PROCEDURE — 97116 GAIT TRAINING THERAPY: CPT

## 2022-10-20 PROCEDURE — G0378 HOSPITAL OBSERVATION PER HR: HCPCS

## 2022-10-20 PROCEDURE — 76870 US EXAM SCROTUM: CPT

## 2022-10-20 PROCEDURE — 99224 PR SBSQ OBSERVATION CARE/DAY 15 MINUTES: CPT | Performed by: STUDENT IN AN ORGANIZED HEALTH CARE EDUCATION/TRAINING PROGRAM

## 2022-10-20 PROCEDURE — 93976 VASCULAR STUDY: CPT

## 2022-10-20 RX ADMIN — ESCITALOPRAM OXALATE 10 MG: 10 TABLET ORAL at 08:25

## 2022-10-20 RX ADMIN — ACETAMINOPHEN 650 MG: 325 TABLET, FILM COATED ORAL at 14:34

## 2022-10-20 RX ADMIN — HYDROCORTISONE 1 APPLICATION: 1 CREAM TOPICAL at 21:24

## 2022-10-20 RX ADMIN — PANTOPRAZOLE SODIUM 40 MG: 40 TABLET, DELAYED RELEASE ORAL at 05:30

## 2022-10-20 RX ADMIN — RIVASTIGMINE TRANSDERMAL SYSTEM 1 PATCH: 4.6 PATCH, EXTENDED RELEASE TRANSDERMAL at 08:26

## 2022-10-20 RX ADMIN — POLYETHYLENE GLYCOL 3350 17 G: 17 POWDER, FOR SOLUTION ORAL at 08:25

## 2022-10-20 RX ADMIN — ROSUVASTATIN CALCIUM 20 MG: 20 TABLET, FILM COATED ORAL at 21:23

## 2022-10-20 RX ADMIN — MELATONIN 6 MG: 3 TAB ORAL at 21:26

## 2022-10-20 RX ADMIN — HYDROCORTISONE 2.5%: 25 CREAM TOPICAL at 21:23

## 2022-10-20 RX ADMIN — AMLODIPINE BESYLATE 10 MG: 10 TABLET ORAL at 08:26

## 2022-10-20 RX ADMIN — HYDROCORTISONE 1 APPLICATION: 1 CREAM TOPICAL at 08:28

## 2022-10-21 PROCEDURE — G0378 HOSPITAL OBSERVATION PER HR: HCPCS

## 2022-10-21 PROCEDURE — 99225 PR SBSQ OBSERVATION CARE/DAY 25 MINUTES: CPT | Performed by: STUDENT IN AN ORGANIZED HEALTH CARE EDUCATION/TRAINING PROGRAM

## 2022-10-21 RX ORDER — AMOXICILLIN 250 MG
2 CAPSULE ORAL 2 TIMES DAILY
Status: DISCONTINUED | OUTPATIENT
Start: 2022-10-21 | End: 2022-11-08 | Stop reason: HOSPADM

## 2022-10-21 RX ADMIN — HYDROCORTISONE 1 APPLICATION: 1 CREAM TOPICAL at 10:19

## 2022-10-21 RX ADMIN — ESCITALOPRAM OXALATE 10 MG: 10 TABLET ORAL at 10:18

## 2022-10-21 RX ADMIN — DOCUSATE SODIUM 50 MG AND SENNOSIDES 8.6 MG 2 TABLET: 8.6; 5 TABLET, FILM COATED ORAL at 20:44

## 2022-10-21 RX ADMIN — DOCUSATE SODIUM 50 MG AND SENNOSIDES 8.6 MG 2 TABLET: 8.6; 5 TABLET, FILM COATED ORAL at 14:57

## 2022-10-21 RX ADMIN — RIVASTIGMINE TRANSDERMAL SYSTEM 1 PATCH: 4.6 PATCH, EXTENDED RELEASE TRANSDERMAL at 10:21

## 2022-10-21 RX ADMIN — HYDROCORTISONE 2.5%: 25 CREAM TOPICAL at 10:24

## 2022-10-21 RX ADMIN — ACETAMINOPHEN 650 MG: 325 TABLET, FILM COATED ORAL at 13:33

## 2022-10-21 RX ADMIN — PANTOPRAZOLE SODIUM 40 MG: 40 TABLET, DELAYED RELEASE ORAL at 06:25

## 2022-10-21 RX ADMIN — HYDROCORTISONE 2.5%: 25 CREAM TOPICAL at 20:44

## 2022-10-21 RX ADMIN — MELATONIN 6 MG: 3 TAB ORAL at 20:44

## 2022-10-21 RX ADMIN — ROSUVASTATIN CALCIUM 20 MG: 20 TABLET, FILM COATED ORAL at 20:44

## 2022-10-21 RX ADMIN — AMLODIPINE BESYLATE 10 MG: 10 TABLET ORAL at 10:18

## 2022-10-22 ENCOUNTER — APPOINTMENT (OUTPATIENT)
Dept: CT IMAGING | Facility: HOSPITAL | Age: 62
End: 2022-10-22

## 2022-10-22 PROCEDURE — G0378 HOSPITAL OBSERVATION PER HR: HCPCS

## 2022-10-22 PROCEDURE — 74176 CT ABD & PELVIS W/O CONTRAST: CPT

## 2022-10-22 PROCEDURE — 99225 PR SBSQ OBSERVATION CARE/DAY 25 MINUTES: CPT | Performed by: STUDENT IN AN ORGANIZED HEALTH CARE EDUCATION/TRAINING PROGRAM

## 2022-10-22 RX ADMIN — AMLODIPINE BESYLATE 10 MG: 10 TABLET ORAL at 09:33

## 2022-10-22 RX ADMIN — HYDROCORTISONE 2.5%: 25 CREAM TOPICAL at 09:35

## 2022-10-22 RX ADMIN — ACETAMINOPHEN 650 MG: 325 TABLET, FILM COATED ORAL at 15:09

## 2022-10-22 RX ADMIN — DOCUSATE SODIUM 50 MG AND SENNOSIDES 8.6 MG 2 TABLET: 8.6; 5 TABLET, FILM COATED ORAL at 09:33

## 2022-10-22 RX ADMIN — MELATONIN 6 MG: 3 TAB ORAL at 22:04

## 2022-10-22 RX ADMIN — ESCITALOPRAM OXALATE 10 MG: 10 TABLET ORAL at 09:33

## 2022-10-22 RX ADMIN — HYDROCORTISONE 2.5%: 25 CREAM TOPICAL at 22:06

## 2022-10-22 RX ADMIN — ROSUVASTATIN CALCIUM 20 MG: 20 TABLET, FILM COATED ORAL at 22:04

## 2022-10-22 RX ADMIN — ACETAMINOPHEN 650 MG: 325 TABLET, FILM COATED ORAL at 10:01

## 2022-10-22 RX ADMIN — PANTOPRAZOLE SODIUM 40 MG: 40 TABLET, DELAYED RELEASE ORAL at 05:30

## 2022-10-23 PROCEDURE — G0378 HOSPITAL OBSERVATION PER HR: HCPCS

## 2022-10-23 PROCEDURE — 99224 PR SBSQ OBSERVATION CARE/DAY 15 MINUTES: CPT | Performed by: STUDENT IN AN ORGANIZED HEALTH CARE EDUCATION/TRAINING PROGRAM

## 2022-10-23 RX ORDER — DIAPER,BRIEF,INFANT-TODD,DISP
1 EACH MISCELLANEOUS EVERY 12 HOURS SCHEDULED
Status: DISPENSED | OUTPATIENT
Start: 2022-10-23 | End: 2022-10-26

## 2022-10-23 RX ADMIN — MELATONIN 6 MG: 3 TAB ORAL at 21:39

## 2022-10-23 RX ADMIN — AMLODIPINE BESYLATE 10 MG: 10 TABLET ORAL at 09:14

## 2022-10-23 RX ADMIN — HYDROCORTISONE 2.5%: 25 CREAM TOPICAL at 21:40

## 2022-10-23 RX ADMIN — ESCITALOPRAM OXALATE 10 MG: 10 TABLET ORAL at 09:14

## 2022-10-23 RX ADMIN — ACETAMINOPHEN 650 MG: 325 TABLET, FILM COATED ORAL at 09:14

## 2022-10-23 RX ADMIN — HYDROCORTISONE 1 APPLICATION: 1 CREAM TOPICAL at 21:40

## 2022-10-23 RX ADMIN — ROSUVASTATIN CALCIUM 20 MG: 20 TABLET, FILM COATED ORAL at 21:39

## 2022-10-23 RX ADMIN — DOCUSATE SODIUM 50 MG AND SENNOSIDES 8.6 MG 2 TABLET: 8.6; 5 TABLET, FILM COATED ORAL at 09:14

## 2022-10-23 RX ADMIN — PANTOPRAZOLE SODIUM 40 MG: 40 TABLET, DELAYED RELEASE ORAL at 05:42

## 2022-10-23 RX ADMIN — ACETAMINOPHEN 650 MG: 325 TABLET, FILM COATED ORAL at 15:14

## 2022-10-24 PROCEDURE — G0378 HOSPITAL OBSERVATION PER HR: HCPCS

## 2022-10-24 PROCEDURE — 99224 PR SBSQ OBSERVATION CARE/DAY 15 MINUTES: CPT | Performed by: STUDENT IN AN ORGANIZED HEALTH CARE EDUCATION/TRAINING PROGRAM

## 2022-10-24 RX ADMIN — ACETAMINOPHEN 650 MG: 325 TABLET, FILM COATED ORAL at 12:05

## 2022-10-24 RX ADMIN — HYDROCORTISONE 1 APPLICATION: 1 CREAM TOPICAL at 09:00

## 2022-10-24 RX ADMIN — ESCITALOPRAM OXALATE 10 MG: 10 TABLET ORAL at 09:43

## 2022-10-24 RX ADMIN — DOCUSATE SODIUM 50 MG AND SENNOSIDES 8.6 MG 2 TABLET: 8.6; 5 TABLET, FILM COATED ORAL at 22:19

## 2022-10-24 RX ADMIN — HYDROCORTISONE 1 APPLICATION: 1 CREAM TOPICAL at 22:20

## 2022-10-24 RX ADMIN — HYDROCORTISONE 2.5%: 25 CREAM TOPICAL at 22:19

## 2022-10-24 RX ADMIN — AMLODIPINE BESYLATE 10 MG: 10 TABLET ORAL at 09:43

## 2022-10-24 RX ADMIN — PANTOPRAZOLE SODIUM 40 MG: 40 TABLET, DELAYED RELEASE ORAL at 09:43

## 2022-10-24 RX ADMIN — DOCUSATE SODIUM 50 MG AND SENNOSIDES 8.6 MG 2 TABLET: 8.6; 5 TABLET, FILM COATED ORAL at 09:43

## 2022-10-24 RX ADMIN — MELATONIN 6 MG: 3 TAB ORAL at 22:25

## 2022-10-24 RX ADMIN — ROSUVASTATIN CALCIUM 20 MG: 20 TABLET, FILM COATED ORAL at 22:19

## 2022-10-24 RX ADMIN — RIVASTIGMINE TRANSDERMAL SYSTEM 1 PATCH: 4.6 PATCH, EXTENDED RELEASE TRANSDERMAL at 15:03

## 2022-10-24 RX ADMIN — HYDROCORTISONE 2.5%: 25 CREAM TOPICAL at 09:00

## 2022-10-25 PROCEDURE — 99224 PR SBSQ OBSERVATION CARE/DAY 15 MINUTES: CPT | Performed by: STUDENT IN AN ORGANIZED HEALTH CARE EDUCATION/TRAINING PROGRAM

## 2022-10-25 PROCEDURE — G0378 HOSPITAL OBSERVATION PER HR: HCPCS

## 2022-10-25 PROCEDURE — 97530 THERAPEUTIC ACTIVITIES: CPT

## 2022-10-25 RX ADMIN — MELATONIN 6 MG: 3 TAB ORAL at 20:46

## 2022-10-25 RX ADMIN — CALCIUM CARBONATE (ANTACID) CHEW TAB 500 MG 1 TABLET: 500 CHEW TAB at 20:46

## 2022-10-25 RX ADMIN — RIVASTIGMINE TRANSDERMAL SYSTEM 1 PATCH: 4.6 PATCH, EXTENDED RELEASE TRANSDERMAL at 09:34

## 2022-10-25 RX ADMIN — AMLODIPINE BESYLATE 10 MG: 10 TABLET ORAL at 09:33

## 2022-10-25 RX ADMIN — ACETAMINOPHEN 650 MG: 325 TABLET, FILM COATED ORAL at 11:29

## 2022-10-25 RX ADMIN — DOCUSATE SODIUM 50 MG AND SENNOSIDES 8.6 MG 2 TABLET: 8.6; 5 TABLET, FILM COATED ORAL at 20:43

## 2022-10-25 RX ADMIN — ACETAMINOPHEN 650 MG: 325 TABLET, FILM COATED ORAL at 20:46

## 2022-10-25 RX ADMIN — ROSUVASTATIN CALCIUM 20 MG: 20 TABLET, FILM COATED ORAL at 20:43

## 2022-10-25 RX ADMIN — DOCUSATE SODIUM 50 MG AND SENNOSIDES 8.6 MG 2 TABLET: 8.6; 5 TABLET, FILM COATED ORAL at 09:33

## 2022-10-25 RX ADMIN — ESCITALOPRAM OXALATE 10 MG: 10 TABLET ORAL at 09:33

## 2022-10-25 RX ADMIN — HYDROCORTISONE 1 APPLICATION: 1 CREAM TOPICAL at 20:46

## 2022-10-25 RX ADMIN — PANTOPRAZOLE SODIUM 40 MG: 40 TABLET, DELAYED RELEASE ORAL at 05:41

## 2022-10-26 PROBLEM — K08.9 POOR DENTITION: Status: ACTIVE | Noted: 2022-10-26

## 2022-10-26 PROCEDURE — 99225 PR SBSQ OBSERVATION CARE/DAY 25 MINUTES: CPT | Performed by: STUDENT IN AN ORGANIZED HEALTH CARE EDUCATION/TRAINING PROGRAM

## 2022-10-26 PROCEDURE — G0378 HOSPITAL OBSERVATION PER HR: HCPCS

## 2022-10-26 PROCEDURE — 97110 THERAPEUTIC EXERCISES: CPT

## 2022-10-26 PROCEDURE — 97530 THERAPEUTIC ACTIVITIES: CPT

## 2022-10-26 PROCEDURE — 97116 GAIT TRAINING THERAPY: CPT

## 2022-10-26 RX ORDER — HYDROCORTISONE 25 MG/G
CREAM TOPICAL 2 TIMES DAILY
Status: DISCONTINUED | OUTPATIENT
Start: 2022-10-26 | End: 2022-11-08 | Stop reason: HOSPADM

## 2022-10-26 RX ADMIN — HYDROCORTISONE 2.5%: 25 CREAM TOPICAL at 13:00

## 2022-10-26 RX ADMIN — ACETAMINOPHEN 650 MG: 325 TABLET, FILM COATED ORAL at 10:40

## 2022-10-26 RX ADMIN — RIVASTIGMINE TRANSDERMAL SYSTEM 1 PATCH: 4.6 PATCH, EXTENDED RELEASE TRANSDERMAL at 09:28

## 2022-10-26 RX ADMIN — HYDROCORTISONE 1 APPLICATION: 1 CREAM TOPICAL at 09:00

## 2022-10-26 RX ADMIN — DOCUSATE SODIUM 50 MG AND SENNOSIDES 8.6 MG 2 TABLET: 8.6; 5 TABLET, FILM COATED ORAL at 09:30

## 2022-10-26 RX ADMIN — HYDROCORTISONE 2.5%: 25 CREAM TOPICAL at 21:25

## 2022-10-26 RX ADMIN — ACETAMINOPHEN 650 MG: 325 TABLET, FILM COATED ORAL at 21:24

## 2022-10-26 RX ADMIN — DOCUSATE SODIUM 50 MG AND SENNOSIDES 8.6 MG 2 TABLET: 8.6; 5 TABLET, FILM COATED ORAL at 21:26

## 2022-10-26 RX ADMIN — AMLODIPINE BESYLATE 10 MG: 10 TABLET ORAL at 09:30

## 2022-10-26 RX ADMIN — ESCITALOPRAM OXALATE 10 MG: 10 TABLET ORAL at 09:30

## 2022-10-26 RX ADMIN — ROSUVASTATIN CALCIUM 20 MG: 20 TABLET, FILM COATED ORAL at 21:24

## 2022-10-26 RX ADMIN — MELATONIN 6 MG: 3 TAB ORAL at 21:24

## 2022-10-27 PROCEDURE — G0378 HOSPITAL OBSERVATION PER HR: HCPCS

## 2022-10-27 PROCEDURE — 97530 THERAPEUTIC ACTIVITIES: CPT

## 2022-10-27 PROCEDURE — 99225 PR SBSQ OBSERVATION CARE/DAY 25 MINUTES: CPT | Performed by: STUDENT IN AN ORGANIZED HEALTH CARE EDUCATION/TRAINING PROGRAM

## 2022-10-27 PROCEDURE — 97110 THERAPEUTIC EXERCISES: CPT

## 2022-10-27 RX ADMIN — ACETAMINOPHEN 650 MG: 325 TABLET, FILM COATED ORAL at 14:06

## 2022-10-27 RX ADMIN — MELATONIN 6 MG: 3 TAB ORAL at 20:29

## 2022-10-27 RX ADMIN — AMLODIPINE BESYLATE 10 MG: 10 TABLET ORAL at 09:16

## 2022-10-27 RX ADMIN — DOCUSATE SODIUM 50 MG AND SENNOSIDES 8.6 MG 2 TABLET: 8.6; 5 TABLET, FILM COATED ORAL at 09:16

## 2022-10-27 RX ADMIN — PANTOPRAZOLE SODIUM 40 MG: 40 TABLET, DELAYED RELEASE ORAL at 05:50

## 2022-10-27 RX ADMIN — DOCUSATE SODIUM 50 MG AND SENNOSIDES 8.6 MG 2 TABLET: 8.6; 5 TABLET, FILM COATED ORAL at 20:28

## 2022-10-27 RX ADMIN — ACETAMINOPHEN 650 MG: 325 TABLET, FILM COATED ORAL at 20:28

## 2022-10-27 RX ADMIN — ESCITALOPRAM OXALATE 10 MG: 10 TABLET ORAL at 09:16

## 2022-10-27 RX ADMIN — RIVASTIGMINE TRANSDERMAL SYSTEM 1 PATCH: 4.6 PATCH, EXTENDED RELEASE TRANSDERMAL at 09:18

## 2022-10-27 RX ADMIN — HYDROCORTISONE 2.5%: 25 CREAM TOPICAL at 20:30

## 2022-10-27 RX ADMIN — ROSUVASTATIN CALCIUM 20 MG: 20 TABLET, FILM COATED ORAL at 20:29

## 2022-10-28 PROCEDURE — 99225 PR SBSQ OBSERVATION CARE/DAY 25 MINUTES: CPT | Performed by: STUDENT IN AN ORGANIZED HEALTH CARE EDUCATION/TRAINING PROGRAM

## 2022-10-28 PROCEDURE — G0378 HOSPITAL OBSERVATION PER HR: HCPCS

## 2022-10-28 RX ADMIN — RIVASTIGMINE TRANSDERMAL SYSTEM 1 PATCH: 4.6 PATCH, EXTENDED RELEASE TRANSDERMAL at 09:19

## 2022-10-28 RX ADMIN — DOCUSATE SODIUM 50 MG AND SENNOSIDES 8.6 MG 2 TABLET: 8.6; 5 TABLET, FILM COATED ORAL at 09:18

## 2022-10-28 RX ADMIN — PANTOPRAZOLE SODIUM 40 MG: 40 TABLET, DELAYED RELEASE ORAL at 09:18

## 2022-10-28 RX ADMIN — ACETAMINOPHEN 650 MG: 325 TABLET, FILM COATED ORAL at 14:45

## 2022-10-28 RX ADMIN — AMLODIPINE BESYLATE 10 MG: 10 TABLET ORAL at 09:18

## 2022-10-28 RX ADMIN — HYDROCORTISONE 2.5%: 25 CREAM TOPICAL at 09:19

## 2022-10-28 RX ADMIN — HYDROCORTISONE 2.5%: 25 CREAM TOPICAL at 20:45

## 2022-10-28 RX ADMIN — ESCITALOPRAM OXALATE 10 MG: 10 TABLET ORAL at 09:18

## 2022-10-28 RX ADMIN — ROSUVASTATIN CALCIUM 20 MG: 20 TABLET, FILM COATED ORAL at 20:45

## 2022-10-28 RX ADMIN — MELATONIN 6 MG: 3 TAB ORAL at 20:47

## 2022-10-29 LAB
ALBUMIN SERPL-MCNC: 3.8 G/DL (ref 3.5–5.2)
ALBUMIN/GLOB SERPL: 1.5 G/DL
ALP SERPL-CCNC: 66 U/L (ref 39–117)
ALT SERPL W P-5'-P-CCNC: 17 U/L (ref 1–41)
ANION GAP SERPL CALCULATED.3IONS-SCNC: 8 MMOL/L (ref 5–15)
AST SERPL-CCNC: 15 U/L (ref 1–40)
BASOPHILS # BLD AUTO: 0.06 10*3/MM3 (ref 0–0.2)
BASOPHILS NFR BLD AUTO: 0.7 % (ref 0–1.5)
BILIRUB SERPL-MCNC: 0.2 MG/DL (ref 0–1.2)
BUN SERPL-MCNC: 21 MG/DL (ref 8–23)
BUN/CREAT SERPL: 19.3 (ref 7–25)
CALCIUM SPEC-SCNC: 9.1 MG/DL (ref 8.6–10.5)
CHLORIDE SERPL-SCNC: 106 MMOL/L (ref 98–107)
CO2 SERPL-SCNC: 26 MMOL/L (ref 22–29)
CREAT SERPL-MCNC: 1.09 MG/DL (ref 0.76–1.27)
DEPRECATED RDW RBC AUTO: 41.2 FL (ref 37–54)
EGFRCR SERPLBLD CKD-EPI 2021: 77.2 ML/MIN/1.73
EOSINOPHIL # BLD AUTO: 0.24 10*3/MM3 (ref 0–0.4)
EOSINOPHIL NFR BLD AUTO: 2.7 % (ref 0.3–6.2)
ERYTHROCYTE [DISTWIDTH] IN BLOOD BY AUTOMATED COUNT: 12.4 % (ref 12.3–15.4)
GLOBULIN UR ELPH-MCNC: 2.6 GM/DL
GLUCOSE SERPL-MCNC: 92 MG/DL (ref 65–99)
HCT VFR BLD AUTO: 38.9 % (ref 37.5–51)
HGB BLD-MCNC: 12.7 G/DL (ref 13–17.7)
IMM GRANULOCYTES # BLD AUTO: 0.03 10*3/MM3 (ref 0–0.05)
IMM GRANULOCYTES NFR BLD AUTO: 0.3 % (ref 0–0.5)
LYMPHOCYTES # BLD AUTO: 2.09 10*3/MM3 (ref 0.7–3.1)
LYMPHOCYTES NFR BLD AUTO: 23.8 % (ref 19.6–45.3)
MCH RBC QN AUTO: 29.9 PG (ref 26.6–33)
MCHC RBC AUTO-ENTMCNC: 32.6 G/DL (ref 31.5–35.7)
MCV RBC AUTO: 91.5 FL (ref 79–97)
MONOCYTES # BLD AUTO: 0.98 10*3/MM3 (ref 0.1–0.9)
MONOCYTES NFR BLD AUTO: 11.2 % (ref 5–12)
NEUTROPHILS NFR BLD AUTO: 5.37 10*3/MM3 (ref 1.7–7)
NEUTROPHILS NFR BLD AUTO: 61.3 % (ref 42.7–76)
NRBC BLD AUTO-RTO: 0 /100 WBC (ref 0–0.2)
PLATELET # BLD AUTO: 221 10*3/MM3 (ref 140–450)
PMV BLD AUTO: 9.8 FL (ref 6–12)
POTASSIUM SERPL-SCNC: 4.1 MMOL/L (ref 3.5–5.2)
PROT SERPL-MCNC: 6.4 G/DL (ref 6–8.5)
RBC # BLD AUTO: 4.25 10*6/MM3 (ref 4.14–5.8)
SODIUM SERPL-SCNC: 140 MMOL/L (ref 136–145)
WBC NRBC COR # BLD: 8.77 10*3/MM3 (ref 3.4–10.8)

## 2022-10-29 PROCEDURE — 85025 COMPLETE CBC W/AUTO DIFF WBC: CPT | Performed by: STUDENT IN AN ORGANIZED HEALTH CARE EDUCATION/TRAINING PROGRAM

## 2022-10-29 PROCEDURE — 99225 PR SBSQ OBSERVATION CARE/DAY 25 MINUTES: CPT | Performed by: STUDENT IN AN ORGANIZED HEALTH CARE EDUCATION/TRAINING PROGRAM

## 2022-10-29 PROCEDURE — 80053 COMPREHEN METABOLIC PANEL: CPT | Performed by: STUDENT IN AN ORGANIZED HEALTH CARE EDUCATION/TRAINING PROGRAM

## 2022-10-29 PROCEDURE — G0378 HOSPITAL OBSERVATION PER HR: HCPCS

## 2022-10-29 RX ADMIN — MELATONIN 6 MG: 3 TAB ORAL at 21:15

## 2022-10-29 RX ADMIN — HYDROCORTISONE 2.5%: 25 CREAM TOPICAL at 07:26

## 2022-10-29 RX ADMIN — HYDROCORTISONE 2.5%: 25 CREAM TOPICAL at 21:16

## 2022-10-29 RX ADMIN — ESCITALOPRAM OXALATE 10 MG: 10 TABLET ORAL at 07:27

## 2022-10-29 RX ADMIN — DOCUSATE SODIUM 50 MG AND SENNOSIDES 8.6 MG 2 TABLET: 8.6; 5 TABLET, FILM COATED ORAL at 07:27

## 2022-10-29 RX ADMIN — RIVASTIGMINE TRANSDERMAL SYSTEM 1 PATCH: 4.6 PATCH, EXTENDED RELEASE TRANSDERMAL at 07:26

## 2022-10-29 RX ADMIN — ACETAMINOPHEN 650 MG: 325 TABLET, FILM COATED ORAL at 14:27

## 2022-10-29 RX ADMIN — ROSUVASTATIN CALCIUM 20 MG: 20 TABLET, FILM COATED ORAL at 21:15

## 2022-10-29 RX ADMIN — PANTOPRAZOLE SODIUM 40 MG: 40 TABLET, DELAYED RELEASE ORAL at 05:42

## 2022-10-30 PROCEDURE — 99225 PR SBSQ OBSERVATION CARE/DAY 25 MINUTES: CPT | Performed by: STUDENT IN AN ORGANIZED HEALTH CARE EDUCATION/TRAINING PROGRAM

## 2022-10-30 PROCEDURE — G0378 HOSPITAL OBSERVATION PER HR: HCPCS

## 2022-10-30 RX ORDER — DIPHENOXYLATE HYDROCHLORIDE AND ATROPINE SULFATE 2.5; .025 MG/1; MG/1
1 TABLET ORAL DAILY
Status: DISCONTINUED | OUTPATIENT
Start: 2022-10-30 | End: 2022-11-08 | Stop reason: HOSPADM

## 2022-10-30 RX ADMIN — AMLODIPINE BESYLATE 10 MG: 10 TABLET ORAL at 09:01

## 2022-10-30 RX ADMIN — ACETAMINOPHEN 650 MG: 325 TABLET, FILM COATED ORAL at 17:48

## 2022-10-30 RX ADMIN — HYDROCORTISONE 2.5%: 25 CREAM TOPICAL at 20:11

## 2022-10-30 RX ADMIN — PANTOPRAZOLE SODIUM 40 MG: 40 TABLET, DELAYED RELEASE ORAL at 06:37

## 2022-10-30 RX ADMIN — MELATONIN 6 MG: 3 TAB ORAL at 20:11

## 2022-10-30 RX ADMIN — RIVASTIGMINE TRANSDERMAL SYSTEM 1 PATCH: 4.6 PATCH, EXTENDED RELEASE TRANSDERMAL at 09:05

## 2022-10-30 RX ADMIN — HYDROCORTISONE 2.5%: 25 CREAM TOPICAL at 09:03

## 2022-10-30 RX ADMIN — ESCITALOPRAM OXALATE 10 MG: 10 TABLET ORAL at 09:01

## 2022-10-30 RX ADMIN — THERA TABS 1 TABLET: TAB at 09:01

## 2022-10-30 RX ADMIN — DOCUSATE SODIUM 50 MG AND SENNOSIDES 8.6 MG 2 TABLET: 8.6; 5 TABLET, FILM COATED ORAL at 09:01

## 2022-10-30 RX ADMIN — ROSUVASTATIN CALCIUM 20 MG: 20 TABLET, FILM COATED ORAL at 20:11

## 2022-10-31 PROCEDURE — 99225 PR SBSQ OBSERVATION CARE/DAY 25 MINUTES: CPT | Performed by: STUDENT IN AN ORGANIZED HEALTH CARE EDUCATION/TRAINING PROGRAM

## 2022-10-31 PROCEDURE — G0378 HOSPITAL OBSERVATION PER HR: HCPCS

## 2022-10-31 PROCEDURE — 97116 GAIT TRAINING THERAPY: CPT

## 2022-10-31 RX ADMIN — DOCUSATE SODIUM 50 MG AND SENNOSIDES 8.6 MG 2 TABLET: 8.6; 5 TABLET, FILM COATED ORAL at 10:57

## 2022-10-31 RX ADMIN — ROSUVASTATIN CALCIUM 20 MG: 20 TABLET, FILM COATED ORAL at 20:24

## 2022-10-31 RX ADMIN — ESCITALOPRAM OXALATE 10 MG: 10 TABLET ORAL at 10:57

## 2022-10-31 RX ADMIN — HYDROCORTISONE 2.5%: 25 CREAM TOPICAL at 09:00

## 2022-10-31 RX ADMIN — RIVASTIGMINE TRANSDERMAL SYSTEM 1 PATCH: 4.6 PATCH, EXTENDED RELEASE TRANSDERMAL at 10:58

## 2022-10-31 RX ADMIN — ACETAMINOPHEN 650 MG: 325 TABLET, FILM COATED ORAL at 10:57

## 2022-10-31 RX ADMIN — PANTOPRAZOLE SODIUM 40 MG: 40 TABLET, DELAYED RELEASE ORAL at 05:50

## 2022-10-31 RX ADMIN — MELATONIN 6 MG: 3 TAB ORAL at 20:24

## 2022-10-31 RX ADMIN — THERA TABS 1 TABLET: TAB at 10:57

## 2022-10-31 RX ADMIN — DOCUSATE SODIUM 50 MG AND SENNOSIDES 8.6 MG 2 TABLET: 8.6; 5 TABLET, FILM COATED ORAL at 20:24

## 2022-10-31 RX ADMIN — AMLODIPINE BESYLATE 10 MG: 10 TABLET ORAL at 10:57

## 2022-10-31 RX ADMIN — HYDROCORTISONE 2.5%: 25 CREAM TOPICAL at 20:25

## 2022-11-01 PROCEDURE — 99224 PR SBSQ OBSERVATION CARE/DAY 15 MINUTES: CPT | Performed by: STUDENT IN AN ORGANIZED HEALTH CARE EDUCATION/TRAINING PROGRAM

## 2022-11-01 PROCEDURE — G0378 HOSPITAL OBSERVATION PER HR: HCPCS

## 2022-11-01 PROCEDURE — 99214 OFFICE O/P EST MOD 30 MIN: CPT | Performed by: PSYCHIATRY & NEUROLOGY

## 2022-11-01 RX ADMIN — DOCUSATE SODIUM 50 MG AND SENNOSIDES 8.6 MG 2 TABLET: 8.6; 5 TABLET, FILM COATED ORAL at 20:54

## 2022-11-01 RX ADMIN — DOCUSATE SODIUM 50 MG AND SENNOSIDES 8.6 MG 2 TABLET: 8.6; 5 TABLET, FILM COATED ORAL at 09:11

## 2022-11-01 RX ADMIN — RIVASTIGMINE TRANSDERMAL SYSTEM 1 PATCH: 4.6 PATCH, EXTENDED RELEASE TRANSDERMAL at 09:14

## 2022-11-01 RX ADMIN — MELATONIN 6 MG: 3 TAB ORAL at 20:54

## 2022-11-01 RX ADMIN — HYDROCORTISONE 2.5%: 25 CREAM TOPICAL at 09:11

## 2022-11-01 RX ADMIN — ESCITALOPRAM OXALATE 10 MG: 10 TABLET ORAL at 09:11

## 2022-11-01 RX ADMIN — AMLODIPINE BESYLATE 10 MG: 10 TABLET ORAL at 09:11

## 2022-11-01 RX ADMIN — PANTOPRAZOLE SODIUM 40 MG: 40 TABLET, DELAYED RELEASE ORAL at 06:32

## 2022-11-01 RX ADMIN — ROSUVASTATIN CALCIUM 20 MG: 20 TABLET, FILM COATED ORAL at 20:54

## 2022-11-01 RX ADMIN — THERA TABS 1 TABLET: TAB at 09:11

## 2022-11-01 RX ADMIN — HYDROCORTISONE 2.5%: 25 CREAM TOPICAL at 20:56
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
## 2022-11-01 NOTE — PLAN OF CARE
Goal Outcome Evaluation: VSS stable this shift. Pt voices no specific complaints or concerns.

## 2022-11-02 PROCEDURE — G0378 HOSPITAL OBSERVATION PER HR: HCPCS

## 2022-11-02 PROCEDURE — 99224 PR SBSQ OBSERVATION CARE/DAY 15 MINUTES: CPT | Performed by: STUDENT IN AN ORGANIZED HEALTH CARE EDUCATION/TRAINING PROGRAM

## 2022-11-02 RX ADMIN — THERA TABS 1 TABLET: TAB at 10:31

## 2022-11-02 RX ADMIN — ROSUVASTATIN CALCIUM 20 MG: 20 TABLET, FILM COATED ORAL at 20:42

## 2022-11-02 RX ADMIN — HYDROCORTISONE 2.5%: 25 CREAM TOPICAL at 20:43

## 2022-11-02 RX ADMIN — DOCUSATE SODIUM 50 MG AND SENNOSIDES 8.6 MG 2 TABLET: 8.6; 5 TABLET, FILM COATED ORAL at 10:31

## 2022-11-02 RX ADMIN — PANTOPRAZOLE SODIUM 40 MG: 40 TABLET, DELAYED RELEASE ORAL at 05:44

## 2022-11-02 RX ADMIN — MELATONIN 6 MG: 3 TAB ORAL at 20:42

## 2022-11-02 RX ADMIN — ACETAMINOPHEN 650 MG: 325 TABLET, FILM COATED ORAL at 20:42

## 2022-11-02 RX ADMIN — HYDROCORTISONE 2.5%: 25 CREAM TOPICAL at 13:05

## 2022-11-02 RX ADMIN — ESCITALOPRAM OXALATE 10 MG: 10 TABLET ORAL at 10:31

## 2022-11-02 RX ADMIN — RIVASTIGMINE TRANSDERMAL SYSTEM 1 PATCH: 4.6 PATCH, EXTENDED RELEASE TRANSDERMAL at 09:00

## 2022-11-02 RX ADMIN — AMLODIPINE BESYLATE 10 MG: 10 TABLET ORAL at 10:31

## 2022-11-02 NOTE — PROGRESS NOTES
FAMILY MEDICINE RESIDENCY SERVICE  DAILY PROGRESS NOTE    NAME: Germain Masterson  : 1960  MRN: [id number]      LOS: 31 days     PROVIDER OF SERVICE: Ирина Stock MD    Chief Complaint: Discharge planning issues    Subjective:     Interval History:  History taken from: patient  Patient is alert and oriented to place situation and self.  Patient has no acute complaints today.    Review of Systems:   Review of Systems   Constitutional: Negative.    HENT: Negative for congestion, dental problem, ear discharge, facial swelling, rhinorrhea and sore throat.    Eyes: Negative for pain and visual disturbance.   Respiratory: Negative for apnea, cough, chest tightness, shortness of breath and wheezing.    Cardiovascular: Negative for chest pain, palpitations and leg swelling.   Gastrointestinal: Negative for abdominal pain, blood in stool, constipation, diarrhea, nausea and vomiting.   Endocrine: Negative for cold intolerance, heat intolerance, polydipsia and polyuria.   Genitourinary: Negative for dysuria, flank pain and hematuria.   Musculoskeletal: Negative for back pain and neck pain.   Skin: Negative for rash.   Neurological: Negative for dizziness, syncope, weakness and headaches.       Objective:     Vital Signs  Temp:  [96.8 °F (36 °C)-98 °F (36.7 °C)] 96.8 °F (36 °C)  Heart Rate:  [67-93] 93  Resp:  [16-18] 18  BP: (108-126)/(64-79) 116/76   Body mass index is 27.52 kg/m².    Physical Exam  Physical Exam  Vitals reviewed.   HENT:      Head: Normocephalic and atraumatic.      Mouth/Throat:      Pharynx: Oropharynx is clear.   Eyes:      Pupils: Pupils are equal, round, and reactive to light.   Cardiovascular:      Rate and Rhythm: Normal rate and regular rhythm.      Pulses: Normal pulses.      Heart sounds: Normal heart sounds.   Pulmonary:      Effort: Pulmonary effort is normal.      Breath sounds: Normal breath sounds.   Abdominal:      General: Abdomen is flat. Bowel sounds are normal.       Palpations: Abdomen is soft.   Musculoskeletal:         General: Normal range of motion.      Cervical back: Normal range of motion and neck supple.   Skin:     General: Skin is warm and dry.      Capillary Refill: Capillary refill takes less than 2 seconds.   Neurological:      General: No focal deficit present.      Mental Status: He is alert and oriented to person, place, and time. Mental status is at baseline.   Psychiatric:         Mood and Affect: Mood normal.         Behavior: Behavior normal.         Scheduled Meds   amLODIPine, 10 mg, Oral, Q24H  escitalopram, 10 mg, Oral, Daily  Hydrocortisone (Perianal), , Rectal, BID  multivitamin, 1 tablet, Oral, Daily  pantoprazole, 40 mg, Oral, Q AM  rivastigmine, 1 patch, Transdermal, Daily  rosuvastatin, 20 mg, Oral, Nightly  senna-docusate sodium, 2 tablet, Oral, BID       PRN Meds   •  acetaminophen  •  calcium carbonate  •  melatonin  •  sodium chloride      Diagnostic Data    Results from last 7 days   Lab Units 10/29/22  0558   WBC 10*3/mm3 8.77   HEMOGLOBIN g/dL 12.7*   HEMATOCRIT % 38.9   PLATELETS 10*3/mm3 221   GLUCOSE mg/dL 92   CREATININE mg/dL 1.09   BUN mg/dL 21   SODIUM mmol/L 140   POTASSIUM mmol/L 4.1   AST (SGOT) U/L 15   ALT (SGPT) U/L 17   ALK PHOS U/L 66   BILIRUBIN mg/dL 0.2   ANION GAP mmol/L 8.0       No radiology results for the last day      I reviewed the patient's new clinical results.    Assessment/Plan:     Active Hospital Problems    Diagnosis  POA   • **Discharge planning issues [Z02.9]  Not Applicable   • Poor dentition [K08.9]  Yes   • Depression [F32.A]  Yes   • Neurocognitive deficits [R29.818, R41.89]  Yes   • Amnesia memory loss [R41.3]  Yes       1. Retrograde amnesia  CT head negative, UDS negative, urinalysis negative, ammonia negative, ethanol negative, CMP within normal limits. B12 normal, thyroid normal.    MRI brain small periventricular white matter foci. RPR and HIV unremarkable.   -As per psychiatry's recommendations  the patient has been deemed competent at this time.  -Placement management initiated by psychiatry; case management involved   -EEG nondiagnostic; neurology follow-up at discharge     2. Vascular dementia  - Exelon 4.6 mg patch daily    3.  Depressed and anxious mood  -Paxil discontinued. Lexapro 10 mg started as per psychiatry recommendation.      4. GERD  - Continued home protonix     5. HTN  - Amlodipine 10 mg  - PRN hydralazine 20 mg if SBP above 160     6. Placement issue/POA  -Case management working to place this patient in a personal care home.  If the patient gets denied from a personal care home. Will try to reach family to seek assistance.  If the patient has no further options we will try to get the patient to a shelter.       7. Hemorrhoids  -Hydrocortisone (perianal) Anusol 2.5% rectal     8. LEFT ankle possible OA (Resolved)  -Capsaicin 0.1 % cream application BID  -PT/OT  -XRAY 10/12/2022 No fracture or dislocation. Very small calcaneal spurs.     9.  Intermittent bouts of constipation  -Bowel regiment in place     10.  Blood work/electrolytes stable for over 7 days:  CBC CMP all stopped 10/19/2022  Stopped due to risk of iatrogenic infection  Once weekly labs     11.  Reported abdominal pain 10/22/2022 (Resolved)  -CT abdomen showed right inguinal hernia repair and no recurrence of hernia.     12. Skin irritation LEFT medical gastronemius  - hydrocortisone cream      13.  Poor dentition  -Intermittent toothache resolved with Tylenol       DVT prophylaxis: SCDs/TEDs  Code status is   Code Status and Medical Interventions:   Ordered at: 10/02/22 1611     Level Of Support Discussed With:    Patient     Code Status (Patient has no pulse and is not breathing):    CPR (Attempt to Resuscitate)     Medical Interventions (Patient has pulse or is breathing):    Full Support     Release to patient:    Routine Release       Plan for disposition:Where: personal-care home or shelter      Time: 15 min         This  document has been electronically signed by Ирина Stock MD on November 2, 2022 13:20 CDT

## 2022-11-02 NOTE — DISCHARGE PLACEMENT REQUEST
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 capacity.  He is aware of his situation though there is some limitation from his baseline cognitive deficit.    Flip Beard MD  11/01/22 at 14:51 CDT    Electronically signed by Flip Beard MD at 11/01/22 1515       Physical Therapy Notes (last 24 hours)  Notes from 11/01/22 1001 through 11/02/22 1001   No notes exist for this encounter.         Occupational Therapy Notes (last 24 hours)  Notes from 11/01/22 1001 through 11/02/22 1001   No notes exist for this encounter.

## 2022-11-02 NOTE — PLAN OF CARE
Goal Outcome Evaluation:      Pt slept through most of shift. No complaints or issues overnight. VSS throughout shift.

## 2022-11-02 NOTE — CONSULTS
"Adult Nutrition  Assessment/PES    Patient Name:  Germain Masterson  YOB: 1960  MRN: [id number]  Admit Date:  10/2/2022    Assessment Date:  11/2/2022    Comments:  RD f/u. Pt continues w/excellent po intake at meals. Wt is stable. Labsl wnl. No nutrition concerns to address.  Awaiting guardianship. Will monitor.      Reason for Assessment     Row Name 11/02/22 1116          Reason for Assessment    Reason For Assessment follow-up protocol                Nutrition/Diet History     Row Name 11/02/22 1116          Nutrition/Diet History    Typical Intake (Food/Fluid/EN/PN) Pt reports \"ok\" appetite intake. He does not voice any prefs/requests.                    Estimated/Assessed Needs - Anthropometrics     Row Name 11/02/22 0500          Anthropometrics    Weight 87 kg (191 lb 12.8 oz)                Nutrition Prescription Ordered     Row Name 11/02/22 1117          Nutrition Prescription PO    Current PO Diet Regular     Fluid Consistency Thin                Evaluation of Received Nutrient/Fluid Intake     Row Name 11/02/22 1117          PO Evaluation    Number of Meals 6     % PO Intake 75 x 3, 100 x 3                   Problem/Interventions:     Problem 2     Row Name 11/02/22 1118          Nutrition Diagnoses Problem 2    Problem 2 Nutrition Appropriate for Condition at this Time     Signs/Symptoms (evidenced by) PO Intake     Percent (%) intake recorded --       Over number of meals 6                    Intervention Goal     Row Name 11/02/22 1118          Intervention Goal    General Maintain nutrition;Meet nutritional needs for age/condition     PO Meet estimated needs;Continue positive trend;Maintain intake     Weight Maintain weight                Nutrition Intervention     Row Name 11/02/22 1119          Nutrition Intervention    RD/Tech Action Follow Tx progress;Care plan reviewd;Encourage intake                  Education/Evaluation     Row Name 11/02/22 1119          Education "    Education Will Instruct as appropriate        Monitor/Evaluation    Monitor Per protocol;PO intake;Pertinent labs;Symptoms;Skin status;Weight     Education Follow-up Reinforce PRN                 Electronically signed by:  Leta Harper RD  11/02/22 11:20 CDT

## 2022-11-03 PROCEDURE — 99224 PR SBSQ OBSERVATION CARE/DAY 15 MINUTES: CPT | Performed by: STUDENT IN AN ORGANIZED HEALTH CARE EDUCATION/TRAINING PROGRAM

## 2022-11-03 PROCEDURE — G0378 HOSPITAL OBSERVATION PER HR: HCPCS

## 2022-11-03 RX ADMIN — ESCITALOPRAM OXALATE 10 MG: 10 TABLET ORAL at 08:52

## 2022-11-03 RX ADMIN — ROSUVASTATIN CALCIUM 20 MG: 20 TABLET, FILM COATED ORAL at 20:10

## 2022-11-03 RX ADMIN — RIVASTIGMINE TRANSDERMAL SYSTEM 1 PATCH: 4.6 PATCH, EXTENDED RELEASE TRANSDERMAL at 08:53

## 2022-11-03 RX ADMIN — AMLODIPINE BESYLATE 10 MG: 10 TABLET ORAL at 08:52

## 2022-11-03 RX ADMIN — MELATONIN 6 MG: 3 TAB ORAL at 21:22

## 2022-11-03 RX ADMIN — THERA TABS 1 TABLET: TAB at 08:52

## 2022-11-03 RX ADMIN — DOCUSATE SODIUM 50 MG AND SENNOSIDES 8.6 MG 2 TABLET: 8.6; 5 TABLET, FILM COATED ORAL at 08:52

## 2022-11-03 NOTE — PROGRESS NOTES
FAMILY MEDICINE RESIDENCY SERVICE  DAILY PROGRESS NOTE    NAME: Germain Masterson  : 1960  MRN: [id number]      LOS: 32 days     PROVIDER OF SERVICE: Ирина Stock MD    Chief Complaint: Discharge planning issues    Subjective:     Interval History:  History taken from: patient      Patient has no acute complaints today.  No acute changes overnight.      Review of Systems:   Review of Systems   Constitutional: Negative.    HENT: Negative for congestion, dental problem, ear discharge, facial swelling, rhinorrhea and sore throat.    Eyes: Negative for pain and visual disturbance.   Respiratory: Negative for apnea, cough, chest tightness, shortness of breath and wheezing.    Cardiovascular: Negative for chest pain, palpitations and leg swelling.   Gastrointestinal: Negative for abdominal pain, blood in stool, constipation, diarrhea, nausea and vomiting.   Endocrine: Negative for cold intolerance, heat intolerance, polydipsia and polyuria.   Genitourinary: Negative for dysuria, flank pain and hematuria.   Musculoskeletal: Negative for back pain and neck pain.   Skin: Negative for rash.   Neurological: Negative for dizziness, syncope, weakness and headaches.       Objective:     Vital Signs  Temp:  [96.8 °F (36 °C)-97.6 °F (36.4 °C)] 97.6 °F (36.4 °C)  Heart Rate:  [66-93] 74  Resp:  [16-19] 16  BP: ()/(52-79) 122/75   Body mass index is 27.41 kg/m².    Physical Exam  Physical Exam  Vitals reviewed.   HENT:      Head: Normocephalic and atraumatic.      Mouth/Throat:      Pharynx: Oropharynx is clear.   Eyes:      Pupils: Pupils are equal, round, and reactive to light.   Cardiovascular:      Rate and Rhythm: Normal rate and regular rhythm.      Pulses: Normal pulses.      Heart sounds: Normal heart sounds.   Pulmonary:      Effort: Pulmonary effort is normal.      Breath sounds: Normal breath sounds.   Abdominal:      General: Abdomen is flat. Bowel sounds are normal.      Palpations: Abdomen is  soft.   Musculoskeletal:         General: Normal range of motion.      Cervical back: Normal range of motion and neck supple.   Skin:     General: Skin is warm and dry.      Capillary Refill: Capillary refill takes less than 2 seconds.   Neurological:      General: No focal deficit present.      Mental Status: He is alert and oriented to person, place, and time. Mental status is at baseline.   Psychiatric:         Mood and Affect: Mood normal.         Behavior: Behavior normal.         Scheduled Meds   amLODIPine, 10 mg, Oral, Q24H  escitalopram, 10 mg, Oral, Daily  Hydrocortisone (Perianal), , Rectal, BID  multivitamin, 1 tablet, Oral, Daily  pantoprazole, 40 mg, Oral, Q AM  rivastigmine, 1 patch, Transdermal, Daily  rosuvastatin, 20 mg, Oral, Nightly  senna-docusate sodium, 2 tablet, Oral, BID       PRN Meds   •  acetaminophen  •  calcium carbonate  •  melatonin  •  sodium chloride      Diagnostic Data    Results from last 7 days   Lab Units 10/29/22  0558   WBC 10*3/mm3 8.77   HEMOGLOBIN g/dL 12.7*   HEMATOCRIT % 38.9   PLATELETS 10*3/mm3 221   GLUCOSE mg/dL 92   CREATININE mg/dL 1.09   BUN mg/dL 21   SODIUM mmol/L 140   POTASSIUM mmol/L 4.1   AST (SGOT) U/L 15   ALT (SGPT) U/L 17   ALK PHOS U/L 66   BILIRUBIN mg/dL 0.2   ANION GAP mmol/L 8.0       No radiology results for the last day      I reviewed the patient's new clinical results.    Assessment/Plan:     Active Hospital Problems    Diagnosis  POA   • **Discharge planning issues [Z02.9]  Not Applicable   • Poor dentition [K08.9]  Yes   • Depression [F32.A]  Yes   • Neurocognitive deficits [R29.818, R41.89]  Yes   • Amnesia memory loss [R41.3]  Yes     1. Retrograde amnesia  CT head negative, UDS negative, urinalysis negative, ammonia negative, ethanol negative, CMP within normal limits. B12 normal, thyroid normal.    MRI brain small periventricular white matter foci. RPR and HIV unremarkable.   -As per psychiatry's recommendations the patient has been deemed  competent at this time.  -Placement management initiated by psychiatry; case management involved   -EEG nondiagnostic; neurology follow-up at discharge     2. Vascular dementia  - Exelon 4.6 mg patch daily     3.  Depressed and anxious mood  -Paxil discontinued. Lexapro 10 mg started as per psychiatry recommendation.      4. GERD  - Continued home protonix     5. HTN  - Amlodipine 10 mg  - PRN hydralazine 20 mg if SBP above 160     6. Placement issue/POA  -The patient is potentially going to [facility].  However [facility] would like to conduct an interview on Monday in order to finalize accepting this patient.          7. Hemorrhoids  -Hydrocortisone (perianal) Anusol 2.5% rectal     8. LEFT ankle possible OA (Resolved)  -Capsaicin 0.1 % cream application BID  -PT/OT  -XRAY 10/12/2022 No fracture or dislocation. Very small calcaneal spurs.     9.  Intermittent bouts of constipation  -Bowel regiment in place     10.  Blood work/electrolytes stable for over 7 days:  CBC CMP all stopped 10/19/2022  Stopped due to risk of iatrogenic infection  Once weekly labs     11.  Reported abdominal pain 10/22/2022 (Resolved)  -CT abdomen showed right inguinal hernia repair and no recurrence of hernia.     12. Skin irritation LEFT medical gastronemius  - hydrocortisone cream      13.  Poor dentition  -Intermittent toothache resolved with Tylenol                        DVT prophylaxis: SCDs/TEDs  Code status is   Code Status and Medical Interventions:   Ordered at: 10/02/22 1611     Level Of Support Discussed With:    Patient     Code Status (Patient has no pulse and is not breathing):    CPR (Attempt to Resuscitate)     Medical Interventions (Patient has pulse or is breathing):    Full Support     Release to patient:    Routine Release       Plan for disposition:Where: [facility]       Time: 15 min         This document has been electronically signed by Ирина Stock MD on November 3, 2022 08:28 CDT

## 2022-11-03 NOTE — PLAN OF CARE
Goal Outcome Evaluation:      Pt VSS overnight. Minor complaints of toenail pain. No other issues overnight. Slept most of shift.

## 2022-11-03 NOTE — PLAN OF CARE
Goal Outcome Evaluation: Pt pleasant and cooperative this shift. Pt voices no specific complaints or concerns. No changes noted.

## 2022-11-03 NOTE — PLAN OF CARE
Goal Outcome Evaluation:  Plan of Care Reviewed With: patient        Progress: no change  Outcome Evaluation: Pt resting in bed no skin issues, no complaints of pain., Alert and oriented

## 2022-11-04 PROCEDURE — G0378 HOSPITAL OBSERVATION PER HR: HCPCS

## 2022-11-04 PROCEDURE — 99224 PR SBSQ OBSERVATION CARE/DAY 15 MINUTES: CPT | Performed by: STUDENT IN AN ORGANIZED HEALTH CARE EDUCATION/TRAINING PROGRAM

## 2022-11-04 RX ADMIN — RIVASTIGMINE TRANSDERMAL SYSTEM 1 PATCH: 4.6 PATCH, EXTENDED RELEASE TRANSDERMAL at 10:25

## 2022-11-04 RX ADMIN — HYDROCORTISONE 2.5%: 25 CREAM TOPICAL at 10:20

## 2022-11-04 RX ADMIN — AMLODIPINE BESYLATE 10 MG: 10 TABLET ORAL at 10:19

## 2022-11-04 RX ADMIN — PANTOPRAZOLE SODIUM 40 MG: 40 TABLET, DELAYED RELEASE ORAL at 06:17

## 2022-11-04 RX ADMIN — ROSUVASTATIN CALCIUM 20 MG: 20 TABLET, FILM COATED ORAL at 20:24

## 2022-11-04 RX ADMIN — HYDROCORTISONE 2.5%: 25 CREAM TOPICAL at 20:26

## 2022-11-04 RX ADMIN — MELATONIN 6 MG: 3 TAB ORAL at 20:25

## 2022-11-04 RX ADMIN — DOCUSATE SODIUM 50 MG AND SENNOSIDES 8.6 MG 2 TABLET: 8.6; 5 TABLET, FILM COATED ORAL at 10:19

## 2022-11-04 RX ADMIN — ESCITALOPRAM OXALATE 10 MG: 10 TABLET ORAL at 10:19

## 2022-11-04 RX ADMIN — THERA TABS 1 TABLET: TAB at 10:19

## 2022-11-04 NOTE — PLAN OF CARE
Problem: Adult Inpatient Plan of Care  Goal: Plan of Care Review  Flowsheets (Taken 11/4/2022 0518)  Progress: improving  Plan of Care Reviewed With: patient  Outcome Evaluation: vss, resting between care, no acute changes   Goal Outcome Evaluation:  Plan of Care Reviewed With: patient        Progress: improving  Outcome Evaluation: vss, resting between care, no acute changes

## 2022-11-04 NOTE — PLAN OF CARE
Goal Outcome Evaluation:  Plan of Care Reviewed With: patient        Progress: improving  Outcome Evaluation: VSS. No acute changes this shift. Awaiting discharge disposition.

## 2022-11-04 NOTE — PROGRESS NOTES
FAMILY MEDICINE RESIDENCY SERVICE  DAILY PROGRESS NOTE    NAME: Germain Masterson  : 1960  MRN: [id number]      LOS: 33 days     PROVIDER OF SERVICE: Ирина Stock MD    Chief Complaint: Discharge planning issues    Subjective:     Interval History:  History taken from: patient    Patient has no acute complaints today.     Review of Systems:   Review of Systems   Constitutional: Negative.    HENT: Negative for congestion, dental problem, ear discharge, facial swelling, rhinorrhea and sore throat.    Eyes: Negative for pain and visual disturbance.   Respiratory: Negative for apnea, cough, chest tightness, shortness of breath and wheezing.    Cardiovascular: Negative for chest pain, palpitations and leg swelling.   Gastrointestinal: Negative for abdominal pain, blood in stool, constipation, diarrhea, nausea and vomiting.   Endocrine: Negative for cold intolerance, heat intolerance, polydipsia and polyuria.   Genitourinary: Negative for dysuria, flank pain and hematuria.   Musculoskeletal: Negative for back pain and neck pain.   Skin: Negative for rash.   Neurological: Negative for dizziness, syncope, weakness and headaches.       Objective:     Vital Signs  Temp:  [97.2 °F (36.2 °C)-98.4 °F (36.9 °C)] 98.4 °F (36.9 °C)  Heart Rate:  [67-96] 67  Resp:  [16-18] 18  BP: (113-139)/(53-85) 113/53   Body mass index is 27.43 kg/m².    Physical Exam  Physical Exam  Vitals reviewed.   HENT:      Head: Normocephalic and atraumatic.      Mouth/Throat:      Pharynx: Oropharynx is clear.   Eyes:      Pupils: Pupils are equal, round, and reactive to light.   Cardiovascular:      Rate and Rhythm: Normal rate and regular rhythm.      Pulses: Normal pulses.      Heart sounds: Normal heart sounds.   Pulmonary:      Effort: Pulmonary effort is normal.      Breath sounds: Normal breath sounds.   Abdominal:      General: Abdomen is flat. Bowel sounds are normal.      Palpations: Abdomen is soft.   Musculoskeletal:          General: Normal range of motion.      Cervical back: Normal range of motion and neck supple.   Skin:     General: Skin is warm and dry.      Capillary Refill: Capillary refill takes less than 2 seconds.   Neurological:      General: No focal deficit present.      Mental Status: He is alert and oriented to person, place, and time. Mental status is at baseline.   Psychiatric:         Mood and Affect: Mood normal.         Behavior: Behavior normal.         Scheduled Meds   amLODIPine, 10 mg, Oral, Q24H  escitalopram, 10 mg, Oral, Daily  Hydrocortisone (Perianal), , Rectal, BID  multivitamin, 1 tablet, Oral, Daily  pantoprazole, 40 mg, Oral, Q AM  rivastigmine, 1 patch, Transdermal, Daily  rosuvastatin, 20 mg, Oral, Nightly  senna-docusate sodium, 2 tablet, Oral, BID       PRN Meds   •  acetaminophen  •  calcium carbonate  •  melatonin  •  sodium chloride      Diagnostic Data    Results from last 7 days   Lab Units 10/29/22  0558   WBC 10*3/mm3 8.77   HEMOGLOBIN g/dL 12.7*   HEMATOCRIT % 38.9   PLATELETS 10*3/mm3 221   GLUCOSE mg/dL 92   CREATININE mg/dL 1.09   BUN mg/dL 21   SODIUM mmol/L 140   POTASSIUM mmol/L 4.1   AST (SGOT) U/L 15   ALT (SGPT) U/L 17   ALK PHOS U/L 66   BILIRUBIN mg/dL 0.2   ANION GAP mmol/L 8.0       No radiology results for the last day      I reviewed the patient's new clinical results.    Assessment/Plan:     Active Hospital Problems    Diagnosis  POA   • **Discharge planning issues [Z02.9]  Not Applicable   • Poor dentition [K08.9]  Yes   • Depression [F32.A]  Yes   • Neurocognitive deficits [R29.818, R41.89]  Yes   • Amnesia memory loss [R41.3]  Yes       DVT prophylaxis: SCDs/TEDs  Code status is   Code Status and Medical Interventions:   Ordered at: 10/02/22 1611     Level Of Support Discussed With:    Patient     Code Status (Patient has no pulse and is not breathing):    CPR (Attempt to Resuscitate)     Medical Interventions (Patient has pulse or is breathing):    Full Support      Release to patient:    Routine Release     1. Retrograde amnesia  CT head negative, UDS negative, urinalysis negative, ammonia negative, ethanol negative, CMP within normal limits. B12 normal, thyroid normal.    MRI brain small periventricular white matter foci. RPR and HIV unremarkable.   -As per psychiatry's recommendations the patient has been deemed competent at this time.  -Placement management initiated by psychiatry; case management involved   -EEG nondiagnostic; neurology follow-up at discharge     2. Vascular dementia  - Exelon 4.6 mg patch daily     3.  Depressed and anxious mood  -Paxil discontinued. Lexapro 10 mg started as per psychiatry recommendation.      4. GERD  - Continued home protonix     5. HTN  - Amlodipine 10 mg  - PRN hydralazine 20 mg if SBP above 160     6. Placement issue/POA  -The patient is potentially going to [facility].  However [facility] would like to conduct an interview on Monday in order to finalize accepting this patient.          7. Hemorrhoids  -Hydrocortisone (perianal) Anusol 2.5% rectal     8. LEFT ankle possible OA (Resolved)  -Capsaicin 0.1 % cream application BID  -PT/OT  -XRAY 10/12/2022 No fracture or dislocation. Very small calcaneal spurs.     9.  Intermittent bouts of constipation  -Bowel regiment in place     10.  Blood work/electrolytes stable for over 7 days:  CBC CMP all stopped 10/19/2022  Stopped due to risk of iatrogenic infection  Once weekly labs     11.  Reported abdominal pain 10/22/2022 (Resolved)  -CT abdomen showed right inguinal hernia repair and no recurrence of hernia.     12. Skin irritation LEFT medical gastronemius  - hydrocortisone cream      13.  Poor dentition  -Intermittent toothache resolved with Tylenol                 Plan for disposition:Where: [facility]       Time: 15 minutes            This document has been electronically signed by Ирина Stock MD on November 4, 2022 06:36 CDT

## 2022-11-05 LAB
ALBUMIN SERPL-MCNC: 3.8 G/DL (ref 3.5–5.2)
ALBUMIN/GLOB SERPL: 1.5 G/DL
ALP SERPL-CCNC: 68 U/L (ref 39–117)
ALT SERPL W P-5'-P-CCNC: 12 U/L (ref 1–41)
ANION GAP SERPL CALCULATED.3IONS-SCNC: 6 MMOL/L (ref 5–15)
AST SERPL-CCNC: 13 U/L (ref 1–40)
BASOPHILS # BLD AUTO: 0.06 10*3/MM3 (ref 0–0.2)
BASOPHILS NFR BLD AUTO: 0.7 % (ref 0–1.5)
BILIRUB SERPL-MCNC: 0.3 MG/DL (ref 0–1.2)
BUN SERPL-MCNC: 19 MG/DL (ref 8–23)
BUN/CREAT SERPL: 20 (ref 7–25)
CALCIUM SPEC-SCNC: 9 MG/DL (ref 8.6–10.5)
CHLORIDE SERPL-SCNC: 107 MMOL/L (ref 98–107)
CO2 SERPL-SCNC: 27 MMOL/L (ref 22–29)
CREAT SERPL-MCNC: 0.95 MG/DL (ref 0.76–1.27)
DEPRECATED RDW RBC AUTO: 41.6 FL (ref 37–54)
EGFRCR SERPLBLD CKD-EPI 2021: 91.1 ML/MIN/1.73
EOSINOPHIL # BLD AUTO: 0.24 10*3/MM3 (ref 0–0.4)
EOSINOPHIL NFR BLD AUTO: 2.8 % (ref 0.3–6.2)
ERYTHROCYTE [DISTWIDTH] IN BLOOD BY AUTOMATED COUNT: 12.5 % (ref 12.3–15.4)
GLOBULIN UR ELPH-MCNC: 2.5 GM/DL
GLUCOSE SERPL-MCNC: 98 MG/DL (ref 65–99)
HCT VFR BLD AUTO: 38.8 % (ref 37.5–51)
HGB BLD-MCNC: 12.7 G/DL (ref 13–17.7)
IMM GRANULOCYTES # BLD AUTO: 0.02 10*3/MM3 (ref 0–0.05)
IMM GRANULOCYTES NFR BLD AUTO: 0.2 % (ref 0–0.5)
LYMPHOCYTES # BLD AUTO: 1.84 10*3/MM3 (ref 0.7–3.1)
LYMPHOCYTES NFR BLD AUTO: 21.8 % (ref 19.6–45.3)
MCH RBC QN AUTO: 30 PG (ref 26.6–33)
MCHC RBC AUTO-ENTMCNC: 32.7 G/DL (ref 31.5–35.7)
MCV RBC AUTO: 91.7 FL (ref 79–97)
MONOCYTES # BLD AUTO: 0.94 10*3/MM3 (ref 0.1–0.9)
MONOCYTES NFR BLD AUTO: 11.1 % (ref 5–12)
NEUTROPHILS NFR BLD AUTO: 5.34 10*3/MM3 (ref 1.7–7)
NEUTROPHILS NFR BLD AUTO: 63.4 % (ref 42.7–76)
NRBC BLD AUTO-RTO: 0 /100 WBC (ref 0–0.2)
PLATELET # BLD AUTO: 216 10*3/MM3 (ref 140–450)
PMV BLD AUTO: 9.6 FL (ref 6–12)
POTASSIUM SERPL-SCNC: 4.3 MMOL/L (ref 3.5–5.2)
PROT SERPL-MCNC: 6.3 G/DL (ref 6–8.5)
RBC # BLD AUTO: 4.23 10*6/MM3 (ref 4.14–5.8)
SODIUM SERPL-SCNC: 140 MMOL/L (ref 136–145)
WBC NRBC COR # BLD: 8.44 10*3/MM3 (ref 3.4–10.8)

## 2022-11-05 PROCEDURE — 80053 COMPREHEN METABOLIC PANEL: CPT | Performed by: STUDENT IN AN ORGANIZED HEALTH CARE EDUCATION/TRAINING PROGRAM

## 2022-11-05 PROCEDURE — 99224 PR SBSQ OBSERVATION CARE/DAY 15 MINUTES: CPT | Performed by: CHIROPRACTOR

## 2022-11-05 PROCEDURE — G0378 HOSPITAL OBSERVATION PER HR: HCPCS

## 2022-11-05 PROCEDURE — 85025 COMPLETE CBC W/AUTO DIFF WBC: CPT | Performed by: STUDENT IN AN ORGANIZED HEALTH CARE EDUCATION/TRAINING PROGRAM

## 2022-11-05 RX ADMIN — DOCUSATE SODIUM 50 MG AND SENNOSIDES 8.6 MG 2 TABLET: 8.6; 5 TABLET, FILM COATED ORAL at 20:26

## 2022-11-05 RX ADMIN — HYDROCORTISONE 2.5%: 25 CREAM TOPICAL at 20:27

## 2022-11-05 RX ADMIN — ESCITALOPRAM OXALATE 10 MG: 10 TABLET ORAL at 08:32

## 2022-11-05 RX ADMIN — MELATONIN 6 MG: 3 TAB ORAL at 20:26

## 2022-11-05 RX ADMIN — HYDROCORTISONE 2.5%: 25 CREAM TOPICAL at 08:32

## 2022-11-05 RX ADMIN — RIVASTIGMINE TRANSDERMAL SYSTEM 1 PATCH: 4.6 PATCH, EXTENDED RELEASE TRANSDERMAL at 08:32

## 2022-11-05 RX ADMIN — THERA TABS 1 TABLET: TAB at 08:32

## 2022-11-05 RX ADMIN — PANTOPRAZOLE SODIUM 40 MG: 40 TABLET, DELAYED RELEASE ORAL at 05:26

## 2022-11-05 RX ADMIN — ROSUVASTATIN CALCIUM 20 MG: 20 TABLET, FILM COATED ORAL at 20:26

## 2022-11-05 RX ADMIN — AMLODIPINE BESYLATE 10 MG: 10 TABLET ORAL at 08:32

## 2022-11-05 RX ADMIN — DOCUSATE SODIUM 50 MG AND SENNOSIDES 8.6 MG 2 TABLET: 8.6; 5 TABLET, FILM COATED ORAL at 08:32

## 2022-11-05 NOTE — PLAN OF CARE
Problem: Adult Inpatient Plan of Care  Goal: Plan of Care Review  Flowsheets (Taken 11/5/2022 5696)  Progress: improving  Plan of Care Reviewed With: patient  Outcome Evaluation: vss, resting between care, ambulating in myrick and room, frequently seeks out staff, frequently attempts to remove toenail, wrapped in kerlex, diet tolerated, no acute changes, waiting for placement   Goal Outcome Evaluation:  Plan of Care Reviewed With: patient        Progress: improving  Outcome Evaluation: vss, resting between care, ambulating in myrick and room, frequently seeks out staff, frequently attempts to remove toenail, wrapped in kerlex, diet tolerated, no acute changes, waiting for placement

## 2022-11-05 NOTE — PROGRESS NOTES
FAMILY MEDICINE RESIDENCY SERVICE  DAILY PROGRESS NOTE    NAME: Germain Masterson  : 1960  MRN: [id number]      LOS: 0 days     PROVIDER OF SERVICE: Durga Trevizo MD    Chief Complaint: Discharge planning issues    Subjective:     Interval History:  History taken from: patient chart  This morning found the patient resting comfortably in his bed.  He slept well overnight.  He has no complaints of pain or discomfort.  His appetite is good.  He is urinating and defecating on his own.  Patient is ambulatory.  They are currently having some discharge planning issues and are awaiting for a discharge facility to interview the patient on Monday for placement at their location.    Review of Systems:   Review of Systems   Constitutional: Negative for fatigue and fever.   HENT: Negative for congestion, rhinorrhea and trouble swallowing.    Eyes: Negative for visual disturbance.   Respiratory: Negative for cough and shortness of breath.    Cardiovascular: Negative for chest pain.   Gastrointestinal: Negative for blood in stool and diarrhea.   Genitourinary: Negative for dysuria and hematuria.   Musculoskeletal: Negative for gait problem.   Skin: Negative for rash.   Neurological: Negative for syncope and light-headedness.   Psychiatric/Behavioral: Negative for confusion, hallucinations and sleep disturbance.       Objective:     Vital Signs  Temp:  [97.1 °F (36.2 °C)-97.3 °F (36.3 °C)] 97.3 °F (36.3 °C)  Heart Rate:  [68-90] 70  Resp:  [16-19] 16  BP: (116-129)/(64-76) 117/64   Body mass index is 27.38 kg/m².    Physical Exam  Physical Exam  Vitals reviewed.   Constitutional:       Appearance: He is normal weight. He is not ill-appearing or diaphoretic.   HENT:      Head: Atraumatic.      Mouth/Throat:      Mouth: Mucous membranes are moist.   Eyes:      General: No scleral icterus.        Right eye: No discharge.         Left eye: No discharge.   Neck:      Vascular: No carotid bruit.   Cardiovascular:       Rate and Rhythm: Normal rate and regular rhythm.      Pulses: Normal pulses.      Heart sounds: No murmur heard.    No gallop.   Pulmonary:      Effort: No respiratory distress.      Breath sounds: No wheezing.   Abdominal:      General: Bowel sounds are normal.      Tenderness: There is no abdominal tenderness.   Musculoskeletal:      Right lower leg: No edema.      Left lower leg: No edema.   Skin:     Capillary Refill: Capillary refill takes less than 2 seconds.      Findings: No lesion or rash.   Neurological:      General: No focal deficit present.      Mental Status: He is alert.   Psychiatric:         Mood and Affect: Mood normal.         Scheduled Meds   amLODIPine, 10 mg, Oral, Q24H  escitalopram, 10 mg, Oral, Daily  Hydrocortisone (Perianal), , Rectal, BID  multivitamin, 1 tablet, Oral, Daily  pantoprazole, 40 mg, Oral, Q AM  rivastigmine, 1 patch, Transdermal, Daily  rosuvastatin, 20 mg, Oral, Nightly  senna-docusate sodium, 2 tablet, Oral, BID       PRN Meds   •  acetaminophen  •  calcium carbonate  •  melatonin  •  sodium chloride      Diagnostic Data          No radiology results for the last day      I reviewed the patient's new clinical results.    Assessment/Plan:     @Active and Resolved Problems  Active Hospital Problems    Diagnosis  POA   • **Discharge planning issues [Z02.9]  Not Applicable   • Poor dentition [K08.9]  Yes   • Depression [F32.A]  Yes   • Neurocognitive deficits [R29.818, R41.89]  Yes   • Amnesia memory loss [R41.3]  Yes      Resolved Hospital Problems   No resolved problems to display.      61-year-old male initially admitted to hospital for trauma induced retrograde amnesia.  Brain imaging negative.  Retrograde amnesia at this point has resolved however we are having continuing problems getting post discharge placement issues resolved.  From a medical standpoint patient appears to be fully recovered.    #1 Retrograde amnesia  - Retrograde amnesia appears to have been resolved  at this point.  - Psychiatry is evaluated patient and deemed him to be competent at this time.  - Repeat lab work done today at 5-day interval within normal limits.    #2 Placement issue/POA  The patient is potentially going to [facility].  However [facility] would like to conduct an interview on Monday in order to finalize accepting this patient.        DVT prophylaxis: SCDs/TEDs  Code status is   Code Status and Medical Interventions:   Ordered at: 10/02/22 1611     Level Of Support Discussed With:    Patient     Code Status (Patient has no pulse and is not breathing):    CPR (Attempt to Resuscitate)     Medical Interventions (Patient has pulse or is breathing):    Full Support     Release to patient:    Routine Release       Plan for disposition:Monday post discharge placement facility will interview patient for possible acceptance into their program.      Time: 25 minutes        This document has been electronically signed by Durga Trevizo MD on November 5, 2022 14:05 CDT

## 2022-11-06 PROCEDURE — G0378 HOSPITAL OBSERVATION PER HR: HCPCS

## 2022-11-06 PROCEDURE — 99224 PR SBSQ OBSERVATION CARE/DAY 15 MINUTES: CPT | Performed by: STUDENT IN AN ORGANIZED HEALTH CARE EDUCATION/TRAINING PROGRAM

## 2022-11-06 RX ADMIN — HYDROCORTISONE 2.5%: 25 CREAM TOPICAL at 09:29

## 2022-11-06 RX ADMIN — PANTOPRAZOLE SODIUM 40 MG: 40 TABLET, DELAYED RELEASE ORAL at 04:27

## 2022-11-06 RX ADMIN — ACETAMINOPHEN 650 MG: 325 TABLET, FILM COATED ORAL at 19:56

## 2022-11-06 RX ADMIN — AMLODIPINE BESYLATE 10 MG: 10 TABLET ORAL at 09:27

## 2022-11-06 RX ADMIN — HYDROCORTISONE 2.5%: 25 CREAM TOPICAL at 19:57

## 2022-11-06 RX ADMIN — ESCITALOPRAM OXALATE 10 MG: 10 TABLET ORAL at 09:27

## 2022-11-06 RX ADMIN — DOCUSATE SODIUM 50 MG AND SENNOSIDES 8.6 MG 2 TABLET: 8.6; 5 TABLET, FILM COATED ORAL at 09:27

## 2022-11-06 RX ADMIN — ROSUVASTATIN CALCIUM 20 MG: 20 TABLET, FILM COATED ORAL at 19:56

## 2022-11-06 RX ADMIN — ACETAMINOPHEN 650 MG: 325 TABLET, FILM COATED ORAL at 12:05

## 2022-11-06 RX ADMIN — RIVASTIGMINE TRANSDERMAL SYSTEM 1 PATCH: 4.6 PATCH, EXTENDED RELEASE TRANSDERMAL at 09:28

## 2022-11-06 RX ADMIN — MELATONIN 6 MG: 3 TAB ORAL at 20:01

## 2022-11-06 RX ADMIN — THERA TABS 1 TABLET: TAB at 09:27

## 2022-11-06 NOTE — PLAN OF CARE
Goal Outcome Evaluation:  Plan of Care Reviewed With: patient        Progress: improving  Outcome Evaluation: pt removed toe nail, pt stated smacked tooth on wall, tylenol for toothache, vss, ambulating in myrick, diet tolerated, waiting for placement

## 2022-11-06 NOTE — PROGRESS NOTES
FAMILY MEDICINE RESIDENCY SERVICE  DAILY PROGRESS NOTE    NAME: Germain Masterson  : 1960  MRN: [id number]      LOS: 34 days     PROVIDER OF SERVICE: Ирина Stock MD    Chief Complaint: Discharge planning issues    Subjective:     Interval History:  History taken from: patient    Patient has no acute concerns today.     Review of Systems:   Review of Systems   Constitutional: Negative.    HENT: Negative for congestion, dental problem, ear discharge, facial swelling, rhinorrhea and sore throat.    Eyes: Negative for pain and visual disturbance.   Respiratory: Negative for apnea, cough, chest tightness, shortness of breath and wheezing.    Cardiovascular: Negative for chest pain, palpitations and leg swelling.   Gastrointestinal: Negative for abdominal pain, blood in stool, constipation, diarrhea, nausea and vomiting.   Endocrine: Negative for cold intolerance, heat intolerance, polydipsia and polyuria.   Genitourinary: Negative for dysuria, flank pain and hematuria.   Musculoskeletal: Negative for back pain and neck pain.        Toenail of great toe of right foot was trying to come off   Skin: Negative for rash.   Neurological: Negative for dizziness, syncope, weakness and headaches.       Objective:     Vital Signs  Temp:  [97.6 °F (36.4 °C)-98.1 °F (36.7 °C)] 98.1 °F (36.7 °C)  Heart Rate:  [67-88] 85  Resp:  [16-19] 19  BP: (104-131)/(60-75) 121/75   Body mass index is 28.24 kg/m².    Physical Exam  Physical Exam  Vitals reviewed.   HENT:      Head: Normocephalic and atraumatic.      Mouth/Throat:      Pharynx: Oropharynx is clear.   Eyes:      Pupils: Pupils are equal, round, and reactive to light.   Cardiovascular:      Rate and Rhythm: Normal rate and regular rhythm.      Pulses: Normal pulses.      Heart sounds: Normal heart sounds.   Pulmonary:      Effort: Pulmonary effort is normal.      Breath sounds: Normal breath sounds.   Abdominal:      General: Abdomen is flat. Bowel sounds are  normal.      Palpations: Abdomen is soft.   Musculoskeletal:         General: Normal range of motion.      Cervical back: Normal range of motion and neck supple.   Skin:     General: Skin is warm and dry.      Capillary Refill: Capillary refill takes less than 2 seconds.   Neurological:      General: No focal deficit present.      Mental Status: He is alert and oriented to person, place, and time. Mental status is at baseline.   Psychiatric:         Mood and Affect: Mood normal.         Behavior: Behavior normal.         Scheduled Meds   amLODIPine, 10 mg, Oral, Q24H  escitalopram, 10 mg, Oral, Daily  Hydrocortisone (Perianal), , Rectal, BID  multivitamin, 1 tablet, Oral, Daily  pantoprazole, 40 mg, Oral, Q AM  rivastigmine, 1 patch, Transdermal, Daily  rosuvastatin, 20 mg, Oral, Nightly  senna-docusate sodium, 2 tablet, Oral, BID       PRN Meds   •  acetaminophen  •  calcium carbonate  •  melatonin  •  sodium chloride      Diagnostic Data    Results from last 7 days   Lab Units 11/05/22  0653   WBC 10*3/mm3 8.44   HEMOGLOBIN g/dL 12.7*   HEMATOCRIT % 38.8   PLATELETS 10*3/mm3 216   GLUCOSE mg/dL 98   CREATININE mg/dL 0.95   BUN mg/dL 19   SODIUM mmol/L 140   POTASSIUM mmol/L 4.3   AST (SGOT) U/L 13   ALT (SGPT) U/L 12   ALK PHOS U/L 68   BILIRUBIN mg/dL 0.3   ANION GAP mmol/L 6.0       No radiology results for the last day      I reviewed the patient's new clinical results.    Assessment/Plan:     Active Hospital Problems    Diagnosis  POA   • **Discharge planning issues [Z02.9]  Not Applicable   • Poor dentition [K08.9]  Yes   • Depression [F32.A]  Yes   • Neurocognitive deficits [R29.818, R41.89]  Yes   • Amnesia memory loss [R41.3]  Yes     61-year-old male initially admitted to hospital for trauma induced retrograde amnesia.  Brain imaging negative.  Retrograde amnesia at this point has resolved however we are having continuing problems getting post discharge placement issues resolved.  From a medical  standpoint patient appears to be fully recovered.     #1 Retrograde amnesia  - Retrograde amnesia appears to have been resolved at this point.  - Psychiatry is evaluated patient and deemed him to be competent at this time.  - Repeat lab work done today at 5-day interval within normal limits.     #2 Placement issue/POA  The patient is potentially going to [facility].  However [facility] would like to conduct an interview on Monday in order to finalize accepting this patient.           DVT prophylaxis: SCDs/TEDs  Code status is   Code Status and Medical Interventions:   Ordered at: 10/02/22 1611     Level Of Support Discussed With:    Patient     Code Status (Patient has no pulse and is not breathing):    CPR (Attempt to Resuscitate)     Medical Interventions (Patient has pulse or is breathing):    Full Support     Release to patient:    Routine Release       Plan for disposition:Personal care home       Time: 15 min         This document has been electronically signed by Ирина Stock MD on November 6, 2022 11:06 CST

## 2022-11-07 PROCEDURE — G0378 HOSPITAL OBSERVATION PER HR: HCPCS

## 2022-11-07 PROCEDURE — 99224 PR SBSQ OBSERVATION CARE/DAY 15 MINUTES: CPT | Performed by: STUDENT IN AN ORGANIZED HEALTH CARE EDUCATION/TRAINING PROGRAM

## 2022-11-07 RX ADMIN — AMLODIPINE BESYLATE 10 MG: 10 TABLET ORAL at 08:27

## 2022-11-07 RX ADMIN — ACETAMINOPHEN 650 MG: 325 TABLET, FILM COATED ORAL at 11:02

## 2022-11-07 RX ADMIN — RIVASTIGMINE TRANSDERMAL SYSTEM 1 PATCH: 4.6 PATCH, EXTENDED RELEASE TRANSDERMAL at 08:29

## 2022-11-07 RX ADMIN — THERA TABS 1 TABLET: TAB at 08:27

## 2022-11-07 RX ADMIN — ROSUVASTATIN CALCIUM 20 MG: 20 TABLET, FILM COATED ORAL at 20:32

## 2022-11-07 RX ADMIN — ESCITALOPRAM OXALATE 10 MG: 10 TABLET ORAL at 08:27

## 2022-11-07 RX ADMIN — PANTOPRAZOLE SODIUM 40 MG: 40 TABLET, DELAYED RELEASE ORAL at 05:03

## 2022-11-07 RX ADMIN — MELATONIN 6 MG: 3 TAB ORAL at 20:36

## 2022-11-07 RX ADMIN — HYDROCORTISONE 2.5%: 25 CREAM TOPICAL at 20:34

## 2022-11-07 RX ADMIN — DOCUSATE SODIUM 50 MG AND SENNOSIDES 8.6 MG 2 TABLET: 8.6; 5 TABLET, FILM COATED ORAL at 08:27

## 2022-11-07 NOTE — PROGRESS NOTES
FAMILY MEDICINE RESIDENCY SERVICE  DAILY PROGRESS NOTE    NAME: Germain Masterson  : 1960  MRN: [id number]      LOS: 33 days     PROVIDER OF SERVICE: Ирина Stock MD    Chief Complaint: Discharge planning issues    Subjective:     Interval History:  History taken from: patient  Patient has no complaints overnight.  We discussed with the patient today that he will be having interview with the personal-care home.     Review of Systems:   Review of Systems   Constitutional: Negative.    HENT: Negative for congestion, dental problem, ear discharge, facial swelling, rhinorrhea and sore throat.    Eyes: Negative for pain and visual disturbance.   Respiratory: Negative for apnea, cough, chest tightness, shortness of breath and wheezing.    Cardiovascular: Negative for chest pain, palpitations and leg swelling.   Gastrointestinal: Negative for abdominal pain, blood in stool, constipation, diarrhea, nausea and vomiting.   Endocrine: Negative for cold intolerance, heat intolerance, polydipsia and polyuria.   Genitourinary: Negative for dysuria, flank pain and hematuria.   Musculoskeletal: Negative for back pain and neck pain.   Skin: Negative for rash.   Neurological: Negative for dizziness, syncope, weakness and headaches.       Objective:     Vital Signs  Temp:  [97.1 °F (36.2 °C)-98.2 °F (36.8 °C)] 97.1 °F (36.2 °C)  Heart Rate:  [68-85] 73  Resp:  [16-19] 18  BP: (121-134)/(71-87) 126/74   Body mass index is 28.31 kg/m².    Physical Exam  Physical Exam  Vitals reviewed.   HENT:      Head: Normocephalic and atraumatic.      Mouth/Throat:      Pharynx: Oropharynx is clear.   Eyes:      Pupils: Pupils are equal, round, and reactive to light.   Cardiovascular:      Rate and Rhythm: Normal rate and regular rhythm.      Pulses: Normal pulses.      Heart sounds: Normal heart sounds.   Pulmonary:      Effort: Pulmonary effort is normal.      Breath sounds: Normal breath sounds.   Abdominal:      General:  Abdomen is flat. Bowel sounds are normal.      Palpations: Abdomen is soft.   Musculoskeletal:         General: Normal range of motion.      Cervical back: Normal range of motion and neck supple.   Skin:     General: Skin is warm and dry.      Capillary Refill: Capillary refill takes less than 2 seconds.   Neurological:      General: No focal deficit present.      Mental Status: He is alert and oriented to person, place, and time. Mental status is at baseline.   Psychiatric:         Mood and Affect: Mood normal.         Behavior: Behavior normal.         Scheduled Meds   amLODIPine, 10 mg, Oral, Q24H  escitalopram, 10 mg, Oral, Daily  Hydrocortisone (Perianal), , Rectal, BID  multivitamin, 1 tablet, Oral, Daily  pantoprazole, 40 mg, Oral, Q AM  rivastigmine, 1 patch, Transdermal, Daily  rosuvastatin, 20 mg, Oral, Nightly  senna-docusate sodium, 2 tablet, Oral, BID       PRN Meds   •  acetaminophen  •  calcium carbonate  •  melatonin  •  sodium chloride      Diagnostic Data    Results from last 7 days   Lab Units 11/05/22  0653   WBC 10*3/mm3 8.44   HEMOGLOBIN g/dL 12.7*   HEMATOCRIT % 38.8   PLATELETS 10*3/mm3 216   GLUCOSE mg/dL 98   CREATININE mg/dL 0.95   BUN mg/dL 19   SODIUM mmol/L 140   POTASSIUM mmol/L 4.3   AST (SGOT) U/L 13   ALT (SGPT) U/L 12   ALK PHOS U/L 68   BILIRUBIN mg/dL 0.3   ANION GAP mmol/L 6.0       No radiology results for the last day      I reviewed the patient's new clinical results.    Assessment/Plan:     Active Hospital Problems    Diagnosis  POA   • **Discharge planning issues [Z02.9]  Not Applicable   • Poor dentition [K08.9]  Yes   • Depression [F32.A]  Yes   • Neurocognitive deficits [R29.818, R41.89]  Yes   • Amnesia memory loss [R41.3]  Yes     61-year-old male initially admitted to hospital for trauma induced retrograde amnesia.  Brain imaging negative.  Retrograde amnesia at this point has resolved however we are having continuing problems getting post discharge placement issues  resolved.  From a medical standpoint patient appears to be fully recovered.     #1 Retrograde amnesia  - Retrograde amnesia appears to have been resolved at this point.  - Psychiatry is evaluated patient and deemed him to be competent at this time.  - Repeat lab work done today at 5-day interval within normal limits.     #2 Placement issue/POA  The patient is potentially going to [facility].  [facility] will interview this patient today and decide if they will accept this patient.                                                  DVT prophylaxis: SCDs/TEDs  Code status is   Code Status and Medical Interventions:   Ordered at: 10/02/22 1611     Level Of Support Discussed With:    Patient     Code Status (Patient has no pulse and is not breathing):    CPR (Attempt to Resuscitate)     Medical Interventions (Patient has pulse or is breathing):    Full Support     Release to patient:    Routine Release       Plan for disposition:Where: personal care home      Time: 15 min         This document has been electronically signed by Ирина Stock MD on November 7, 2022 06:51 CST

## 2022-11-07 NOTE — PLAN OF CARE
Goal Outcome Evaluation:         Patient rested well overnight, vss, no acute events overnight, meeting with placement facility today for possible acceptance.

## 2022-11-08 VITALS
HEART RATE: 94 BPM | OXYGEN SATURATION: 95 % | DIASTOLIC BLOOD PRESSURE: 75 MMHG | RESPIRATION RATE: 19 BRPM | TEMPERATURE: 96.9 F | SYSTOLIC BLOOD PRESSURE: 139 MMHG | BODY MASS INDEX: 28.35 KG/M2 | HEIGHT: 70 IN | WEIGHT: 198 LBS

## 2022-11-08 PROBLEM — Z02.9 DISCHARGE PLANNING ISSUES: Status: RESOLVED | Noted: 2022-10-14 | Resolved: 2022-11-08

## 2022-11-08 PROCEDURE — 99217 PR OBSERVATION CARE DISCHARGE MANAGEMENT: CPT | Performed by: STUDENT IN AN ORGANIZED HEALTH CARE EDUCATION/TRAINING PROGRAM

## 2022-11-08 PROCEDURE — G0378 HOSPITAL OBSERVATION PER HR: HCPCS

## 2022-11-08 RX ORDER — HYDROXYZINE PAMOATE 25 MG/1
25 CAPSULE ORAL ONCE
Status: COMPLETED | OUTPATIENT
Start: 2022-11-08 | End: 2022-11-08

## 2022-11-08 RX ORDER — HYDROXYZINE PAMOATE 25 MG/1
25 CAPSULE ORAL 3 TIMES DAILY PRN
Qty: 90 CAPSULE | Refills: 0 | Status: SHIPPED | OUTPATIENT
Start: 2022-11-08

## 2022-11-08 RX ORDER — ACETAMINOPHEN 325 MG/1
650 TABLET ORAL EVERY 6 HOURS PRN
Qty: 60 TABLET | Refills: 0 | Status: SHIPPED | OUTPATIENT
Start: 2022-11-08

## 2022-11-08 RX ORDER — AMLODIPINE BESYLATE 10 MG/1
10 TABLET ORAL
Qty: 30 TABLET | Refills: 1 | Status: SHIPPED | OUTPATIENT
Start: 2022-11-09

## 2022-11-08 RX ORDER — ESCITALOPRAM OXALATE 10 MG/1
10 TABLET ORAL DAILY
Qty: 30 TABLET | Refills: 1 | Status: SHIPPED | OUTPATIENT
Start: 2022-11-09

## 2022-11-08 RX ORDER — ROSUVASTATIN CALCIUM 20 MG/1
20 TABLET, COATED ORAL NIGHTLY
Qty: 30 TABLET | Refills: 3 | Status: SHIPPED | OUTPATIENT
Start: 2022-11-08

## 2022-11-08 RX ORDER — HYDROCORTISONE 25 MG/G
CREAM TOPICAL 2 TIMES DAILY
Qty: 28 G | Refills: 1 | Status: SHIPPED | OUTPATIENT
Start: 2022-11-08

## 2022-11-08 RX ORDER — AMOXICILLIN 250 MG
2 CAPSULE ORAL 2 TIMES DAILY
Qty: 60 TABLET | Refills: 1 | Status: SHIPPED | OUTPATIENT
Start: 2022-11-08

## 2022-11-08 RX ORDER — RIVASTIGMINE 4.6 MG/24H
1 PATCH, EXTENDED RELEASE TRANSDERMAL DAILY
Qty: 30 EACH | Refills: 1 | Status: SHIPPED | OUTPATIENT
Start: 2022-11-09

## 2022-11-08 RX ORDER — LANOLIN ALCOHOL/MO/W.PET/CERES
6 CREAM (GRAM) TOPICAL NIGHTLY PRN
Qty: 30 TABLET | Refills: 1 | Status: SHIPPED | OUTPATIENT
Start: 2022-11-08

## 2022-11-08 RX ADMIN — HYDROXYZINE PAMOATE 25 MG: 25 CAPSULE ORAL at 13:25

## 2022-11-08 RX ADMIN — THERA TABS 1 TABLET: TAB at 08:08

## 2022-11-08 RX ADMIN — AMLODIPINE BESYLATE 10 MG: 10 TABLET ORAL at 08:08

## 2022-11-08 RX ADMIN — DOCUSATE SODIUM 50 MG AND SENNOSIDES 8.6 MG 2 TABLET: 8.6; 5 TABLET, FILM COATED ORAL at 08:08

## 2022-11-08 RX ADMIN — ACETAMINOPHEN 650 MG: 325 TABLET, FILM COATED ORAL at 12:05

## 2022-11-08 RX ADMIN — ESCITALOPRAM OXALATE 10 MG: 10 TABLET ORAL at 08:08

## 2022-11-08 RX ADMIN — PANTOPRAZOLE SODIUM 40 MG: 40 TABLET, DELAYED RELEASE ORAL at 05:47

## 2022-11-08 RX ADMIN — HYDROCORTISONE 2.5%: 25 CREAM TOPICAL at 08:10

## 2022-11-08 RX ADMIN — RIVASTIGMINE TRANSDERMAL SYSTEM 1 PATCH: 4.6 PATCH, EXTENDED RELEASE TRANSDERMAL at 08:09

## 2022-11-08 NOTE — DISCHARGE SUMMARY
DISCHARGE SUMMARY    PATIENT NAME: Germain Masterson       PHYSICIAN: Ирина Stock MD  : 1960  MRN: [id number]    ADMITTED: 10/2/2022     DISCHARGED: 22    ADMISSION DIAGNOSES:  Active Hospital Problems    Diagnosis  POA   • Poor dentition [K08.9]  Yes   • Depression [F32.A]  Yes   • Neurocognitive deficits [R29.818, R41.89]  Yes   • Amnesia memory loss [R41.3]  Yes      Resolved Hospital Problems    Diagnosis Date Resolved POA   • **Discharge planning issues [Z02.9] 2022 Not Applicable       DISCHARGE DIAGNOSES:   Active Hospital Problems    Diagnosis  POA   • Poor dentition [K08.9]  Yes   • Depression [F32.A]  Yes   • Neurocognitive deficits [R29.818, R41.89]  Yes   • Amnesia memory loss [R41.3]  Yes      Resolved Hospital Problems    Diagnosis Date Resolved POA   • **Discharge planning issues [Z02.9] 2022 Not Applicable       SERVICE: Family Medicine Residency  Attending: Geoff South MD  Resident: Ирина Stock MD    CONSULTS:   Consult Orders (all) (From admission, onward)     Start     Ordered    22 1140  Inpatient Psychiatrist Consult  Once        Specialty:  Psychiatry  Provider:  Flip Beard MD    22 1140    10/28/22 0659  Inpatient Spiritual Care Consult  Once        Comments: Pt inside now in hospital for 3 weeks   Amnesia  Awaits court guardianship  Needs someone to listen and talk to him   Wonderful gentleman sad story  Family will not help him   Provider:  (Not yet assigned)    10/28/22 0700    10/04/22 1522  Inpatient Case Management  Consult  Once        Provider:  (Not yet assigned)    10/04/22 1521    10/03/22 2154  Inpatient Case Management  Consult  Once        Provider:  (Not yet assigned)    10/03/22 2154    10/02/22 1807  Inpatient Case Management  Consult  Once        Provider:  (Not yet assigned)    10/02/22 1806    10/02/22 1531  Psychiatry (on-call MD unless specified)  Once         Specialty:  Psychiatry  Provider:  Flip Beard MD    10/02/22 1531                PROCEDURES:   n/a    HISTORY OF PRESENT ILLNESS:  Taken from Dr. Trimble's   H&P 10/2/22        Germain Masterson is a 61 y.o. male with PMH of fatty liver disease, HTN, inguinal hernia repair, arthritis, and depression brought by EMS for altered mental status.  Patient is oriented to place but last remembers awakening on someone's yard. EMS discovered his name as he was holding a bottle of protonix with his name on it. He became teary-eyed when acknowledging his exposures to several strangers and not knowing who he is or what was going on. He stated that his father had dementia, a tumor was found in his lung, and he passed away. He is scared this will happen to him. Denies any known use of alcohol, tobacco or illicit drugs that he can remember. He does not know what country he was born in, who the president is or anyone with him. Verified patient's appearance and name with his , Junito, who had last seen him 3 months ago. Baseline no confusion            DIAGNOSTIC DATA:   Lab Results (last 24 hours)     ** No results found for the last 24 hours. **        Imaging Results (Last 72 Hours)     ** No results found for the last 72 hours. **      Vital Signs  Temp:  [97.1 °F (36.2 °C)-98.2 °F (36.8 °C)] 97.1 °F (36.2 °C)  Heart Rate:  [68-85] 73  Resp:  [16-19] 18  BP: (121-134)/(71-87) 126/74   Body mass index is 28.31 kg/m².     Physical Exam  Physical Exam  Vitals reviewed.   HENT:      Head: Normocephalic and atraumatic.      Mouth/Throat:      Pharynx: Oropharynx is clear.   Eyes:      Pupils: Pupils are equal, round, and reactive to light.   Cardiovascular:      Rate and Rhythm: Normal rate and regular rhythm.      Pulses: Normal pulses.      Heart sounds: Normal heart sounds.   Pulmonary:      Effort: Pulmonary effort is normal.      Breath sounds: Normal breath sounds.   Abdominal:      General: Abdomen is flat.  Bowel sounds are normal.      Palpations: Abdomen is soft.   Musculoskeletal:         General: Normal range of motion.      Cervical back: Normal range of motion and neck supple.   Skin:     General: Skin is warm and dry.      Capillary Refill: Capillary refill takes less than 2 seconds.   Neurological:      General: No focal deficit present.      Mental Status: He is alert and oriented to person, place, and time. Mental status is at baseline.   Psychiatric:         Mood and Affect: Mood normal.         Behavior: Behavior normal.           HOSPITAL COURSE:    Germain Masterson is a 61-year-old male who initially presented to the hospital with retrograde amnesia.   Patient had Medical work up to look for other causes of memory deficits which included the following:   Patient had chest x-ray which showed discoid atelectasis left lung base.   - Head CT, CTA neck, CT Angiogram,  showed no acute process.   - Brain MRI showed Right frontal lobe periventricular deep white matter multiple very small sub-2 mm circular foci of increased signal on FLAIR sequence. The differential for this finding would include probable small foci of chronic ischemic gliosis secondary to microvasculature disease versus sequela from migraines versus less likely vasculitic disorder or demyelinating process.  - CBC, BNP, Troponin, UDS, UA, Vitamin B12, Folate, TSH+T4, RPR, HIV which did not have any acute findings.     Patient was evaluated by psychiatry 2 times who had deemed him to be incompetent.  They had also recommended to switch the patient's Paxil and start the patient on Lexapro for depression. Patient was awaiting guardianship with state as a surrogate decision maker could not be identified. Over his hospital stay, his mentation had improved.  Psychiatry had reevaluated him on 10/3/2022 and deemed him to be competent.  As the patient does not have any financial income or home to go to case management worked on finding him a personal care  home that would accept the patient.  Patient was accepted on 11/8/2022 and is going to [facility].      Patient was being treated for his chronic conditions GERD, HTN, Hemorrhoids, Vascular Dementia.                   DISCHARGE CONDITION:   Stable     DISPOSITION:  Another Health Care Institution Not Defined    DISCHARGE MEDICATIONS     Discharge Medications      New Medications      Instructions Start Date   amLODIPine 10 MG tablet  Commonly known as: NORVASC   10 mg, Oral, Every 24 Hours Scheduled   Start Date: November 9, 2022     escitalopram 10 MG tablet  Commonly known as: LEXAPRO   10 mg, Oral, Daily   Start Date: November 9, 2022     Hydrocortisone (Perianal) 2.5 % rectal cream  Commonly known as: ANUSOL-HC   Rectal, 2 Times Daily      hydrOXYzine pamoate 25 MG capsule  Commonly known as: VISTARIL   25 mg, Oral, 3 Times Daily PRN      melatonin 3 MG tablet   6 mg, Oral, Nightly PRN      rivastigmine 4.6 MG/24HR patch  Commonly known as: EXELON   1 patch, Transdermal, Daily   Start Date: November 9, 2022     rosuvastatin 20 MG tablet  Commonly known as: CRESTOR   20 mg, Oral, Nightly      sennosides-docusate 8.6-50 MG per tablet  Commonly known as: PERICOLACE   2 tablets, Oral, 2 Times Daily         Changes to Medications      Instructions Start Date   TYLENOL ARTHRITIS PAIN PO  What changed: Another medication with the same name was added. Make sure you understand how and when to take each.   1 tablet, Oral, Daily      acetaminophen 325 MG tablet  Commonly known as: TYLENOL  What changed: You were already taking a medication with the same name, and this prescription was added. Make sure you understand how and when to take each.   650 mg, Oral, Every 6 Hours PRN         Continue These Medications      Instructions Start Date   docusate sodium 100 MG capsule  Commonly known as: COLACE   100 mg, Oral, 2 Times Daily      ondansetron ODT 4 MG disintegrating tablet  Commonly known as: ZOFRAN-ODT   4 mg,  Translingual, Every 8 Hours PRN      pantoprazole 40 MG EC tablet  Commonly known as: PROTONIX   40 mg, Oral, 2 Times Daily         Stop These Medications    HYDROcodone-acetaminophen 7.5-325 MG per tablet  Commonly known as: NORCO            INSTRUCTIONS:  Activity:   Activity Instructions     Activity as Tolerated          Diet:   Diet Instructions     Diet: Regular      Discharge Diet: Regular          FOLLOW UP:   Additional Instructions for the Follow-ups that You Need to Schedule     Discharge Follow-up with PCP   As directed       Currently Documented PCP:    Rene Yee MD    PCP Phone Number:    [phone]     Follow Up Details: Follow up in 1 week            Follow-up Information     Rene Yee MD Follow up.    Specialties: Neurology, Emergency Medicine, Internal Medicine, Family Medicine  Contact information:  [address] [place] [address] [phone]             Rene Yee MD .    Specialties: Neurology, Emergency Medicine, Internal Medicine, Family Medicine  Why: Follow up in 1 week  Contact information:  [address] [place] [address] [phone]                         PENDING TEST RESULTS AT DISCHARGE      Time: 35 minutes was spent in discharge planning, medication reconciliation and coordination of care for this patient.    Geoff South MD is the attending at time of discharge, He is aware of the patient's status and agrees with the above discharge summary.        This document has been electronically signed by Ирина Stock MD on November 8, 2022 11:09 CST  This document has been electronically signed by Ирина Stock MD on November 8, 2022 11:09 CST    Part of this note may be an electronic transcription or translation of spoken language to printed text using the Dragon Dictation System

## 2022-11-08 NOTE — PLAN OF CARE
Goal Outcome Evaluation:  Plan of Care Reviewed With: patient        Progress: improving  Outcome Evaluation: VSS, sleeping well

## 2022-11-08 NOTE — DISCHARGE PLACEMENT REQUEST
"Devyn Masterson (61 y.o. Male)     Date of Birth   1960    Social Security Number       Address   [address] [id number]    Home Phone   [phone]    MRN   [id number]       Latter day   None    Marital Status   Single                            Admission Date   10/2/22    Admission Type   Emergency    Admitting Provider   Geoff South MD    Attending Provider       Department, Room/Bed   [address], 412/1       Discharge Date   11/8/2022    Discharge Disposition   Another Health Care Institution Not Defined    Discharge Destination                               Attending Provider: (none)   Allergies: No Known Allergies    Isolation: None   Infection: None   Code Status: CPR    Ht: 177.8 cm (70\")   Wt: 89.8 kg (198 lb)    Admission Cmt: None   Principal Problem: Discharge planning issues [Z02.9]                 Active Insurance as of 10/2/2022     Primary Coverage     Payor Plan Insurance Group Employer/Plan Group    [place] [id number]     Payor Plan Address Payor Plan Phone Number Payor Plan Fax Number Effective Dates    [employer] [address] [address] [phone]  1/1/2021 - None Entered    [facility] [id number]       Subscriber Name Subscriber Birth Date Member ID       DEVYN MASTERSON 1960 [id number]                 Emergency Contacts      (Rel.) Home Phone Work Phone Mobile Phone    SHOAIB PICHARDO (Significant Other) [phone] -- [phone]    Angel Masterson (Brother) [phone] -- --               History & Physical      Valeriy Trimble MD at 10/02/22 3710     Attestation signed by Enio Motta MD at 10/03/22 9443    I have reviewed this documentation and agree.  I saw and evaluated the patient with the resident.  I discussed the case with the resident and agree with the findings and plan as documented in the residents note.        This document has been electronically " signed by Enio Motta MD on October 3, 2022 09:27 CDT                           HISTORY AND PHYSICAL  NAME: Germain Masterson  : 1960  MRN: [id number]    DATE OF ADMISSION: 10/02/22    DATE & TIME SEEN: 10/02/22 16:14 CDT    PCP: Rene Yee MD    CODE STATUS:   Code Status and Medical Interventions:   Ordered at: 10/02/22 1611     Level Of Support Discussed With:    Patient     Code Status (Patient has no pulse and is not breathing):    CPR (Attempt to Resuscitate)     Medical Interventions (Patient has pulse or is breathing):    Full Support     Release to patient:    Routine Release       CHIEF COMPLAINT:Amnesia    HPI:  Germain Masterson is a 61 y.o. male with PMH of fatty liver disease, HTN, inguinal hernia repair, arthritis, and depression brought by EMS for altered mental status.  Patient is oriented to place but last remembers awakening on someone's yard. EMS discovered his name as he was holding a bottle of protonix with his name on it. He became teary-eyed when acknowledging his exposures to several strangers and not knowing who he is or what was going on. He stated that his father had dementia, a tumor was found in his lung, and he passed away. He is scared this will happen to him. Denies any known use of alcohol, tobacco or illicit drugs that he can remember. He does not know what country he was born in, who the president is or anyone with him. Verified patient's appearance and name with his , Junito, who had last seen him 3 months ago. Baseline no confusion.     CONCURRENT MEDICAL HISTORY:  Past Medical History:   Diagnosis Date   • Arthritis    • Depression    • Difficulty voiding    • Fatty liver    • Hypertension    • Inguinal hernia     right   • Loose, teeth        PAST SURGICAL HISTORY:  Past Surgical History:   Procedure Laterality Date   • CYSTECTOMY      back   • ENDOSCOPY N/A 10/2/2020    Procedure: ESOPHAGOGASTRODUODENOSCOPY;  Surgeon: Jovan Powell MD;   Location: [facility] ENDOSCOPY;  Service: Gastroenterology;  Laterality: N/A;   • INGUINAL HERNIA REPAIR Right 6/2/2022    Procedure: OPEN RIGHT INGUINAL HERNIA  REPAIR  WITH USE OF MESH;  Surgeon: Jones Parra MD;  Location: [facility] OR;  Service: General;  Laterality: Right;     FAMILY HISTORY:  Family History   Problem Relation Age of Onset   • Hypertension Mother    • Cancer Mother    • Cancer Father       SOCIAL HISTORY:  Social History     Socioeconomic History   • Marital status: Single   Tobacco Use   • Smoking status: Current Every Day Smoker     Packs/day: 0.25     Years: 45.00     Pack years: 11.25     Types: Cigars   • Smokeless tobacco: Never Used   • Tobacco comment: Used to smoke cigarettes for 15 years 1.5ppd   Vaping Use   • Vaping Use: Never used   Substance and Sexual Activity   • Alcohol use: No   • Drug use: No   • Sexual activity: Yes     Partners: Female     Birth control/protection: None     HOME MEDICATIONS:  Prior to Admission medications    Medication Sig Start Date End Date Taking? Authorizing Provider   pantoprazole (PROTONIX) 40 MG EC tablet Take 1 tablet by mouth 2 (Two) Times a Day. 10/15/21  Yes Andry Aquino MD   Acetaminophen (TYLENOL ARTHRITIS PAIN PO) Take 1 tablet by mouth Daily.    Provider, MD Rosemarie   docusate sodium (COLACE) 100 MG capsule Take 1 capsule by mouth 2 (Two) Times a Day. 5/8/22   Sabas Felder MD   HYDROcodone-acetaminophen (NORCO) 7.5-325 MG per tablet Take 1 tablet by mouth Every 6 (Six) Hours As Needed for Moderate Pain. 6/2/22   Jones Parra MD   ondansetron ODT (ZOFRAN-ODT) 4 MG disintegrating tablet Place 1 tablet on the tongue Every 8 (Eight) Hours As Needed for Nausea or Vomiting. 5/8/22   Sabas Felder MD       ALLERGIES:  Patient has no known allergies.    REVIEW OF SYSTEMS  Review of Systems   Constitutional: Negative for chills and fever.   HENT: Positive for ear pain (Left ear pain with tugging). Negative for congestion,  rhinorrhea and sore throat.    Eyes: Negative for visual disturbance.   Respiratory: Negative for cough, chest tightness and shortness of breath.    Cardiovascular: Negative for chest pain and leg swelling.   Gastrointestinal: Negative for abdominal pain, constipation, diarrhea and vomiting.   Genitourinary: Negative for difficulty urinating and dysuria.   Musculoskeletal: Negative for back pain and gait problem.   Skin: Positive for rash.   Allergic/Immunologic: Negative for immunocompromised state.   Neurological: Positive for syncope. Negative for tremors, facial asymmetry, weakness and headaches.   Hematological: Does not bruise/bleed easily.   Psychiatric/Behavioral: Positive for decreased concentration (Memory loss). Negative for confusion and sleep disturbance.       PHYSICAL EXAM:  Temp:  [98.7 °F (37.1 °C)] 98.7 °F (37.1 °C)  Heart Rate:  [68-80] 80  Resp:  [18] 18  BP: (149-171)/(82-90) 149/90  Body mass index is 25.94 kg/m².  Physical Exam  Vitals and nursing note reviewed.   Constitutional:       General: He is not in acute distress.     Appearance: Normal appearance. He is not ill-appearing.      Comments: Dandruff in hair and on shirt with lint in umbilicus. Otherwise well-kempt and cooperative   HENT:      Head: Normocephalic and atraumatic.      Right Ear: Tympanic membrane, ear canal and external ear normal.      Left Ear: Tympanic membrane normal. There is no impacted cerumen.      Ears:      Comments: Tenderness to left pinna tug. Erythematous discoloration of external ear canal. Blood versus dark cerumen. Hearing intact     Nose: No congestion or rhinorrhea.      Mouth/Throat:      Mouth: Mucous membranes are moist.      Pharynx: Oropharynx is clear.      Comments: Poor dentition. Missing multiple teeth.   Eyes:      General: No scleral icterus.     Extraocular Movements: Extraocular movements intact.      Conjunctiva/sclera: Conjunctivae normal.      Pupils: Pupils are equal, round, and reactive  to light.   Neck:      Vascular: No carotid bruit.   Cardiovascular:      Rate and Rhythm: Normal rate and regular rhythm.      Pulses:           Radial pulses are 2+ on the right side and 2+ on the left side.        Dorsalis pedis pulses are 2+ on the right side and 2+ on the left side.        Posterior tibial pulses are 2+ on the right side and 2+ on the left side.   Musculoskeletal:      Cervical back: Normal range of motion and neck supple. No tenderness.      Right lower leg: No edema.      Left lower leg: No edema.   Skin:     General: Skin is warm and dry.          Neurological:      Mental Status: He is alert.      GCS: GCS eye subscore is 4. GCS verbal subscore is 5. GCS motor subscore is 6.      Motor: Motor function is intact. No weakness or tremor.      Coordination: Coordination is intact.         DIAGNOSTIC DATA:   Lab Results (last 24 hours)     Procedure Component Value Units Date/Time    Extra Tubes [[id number]] Collected: 10/02/22 1425    Specimen: Blood, Venous Line Updated: 10/02/22 1534    Narrative:      The following orders were created for panel order Extra Tubes.  Procedure                               Abnormality         Status                     ---------                               -----------         ------                     Gold Top - SST[[id number]]                                   Final result               Gray Top[[id number]]                                         In process                   Please view results for these tests on the individual orders.    Gold Top - SST [[id number]] Collected: 10/02/22 1425    Specimen: Blood Updated: 10/02/22 1534     Extra Tube Hold for add-ons.     Comment: Auto resulted.       D-dimer, Quantitative [[id number]]  (Normal) Collected: 10/02/22 1425    Specimen: Blood Updated: 10/02/22 1501     D-Dimer, Quantitative <270 ng/mL (FEU)     Narrative:      Dimer values <500 ng/ml FEU are FDA approved as aid in diagnosis of deep venous thrombosis and  pulmonary embolism.  This test should not be used in an exclusion strategy with pretest probability alone.    A recent guideline regarding diagnosis for pulmonary thromboembolism recommends an adjusted exclusion criterion of age x 10 ng/ml FEU for patients >50 years of age (Judy Intern Med 2015; 163: 701-711).      BNP [[id number]]  (Normal) Collected: 10/02/22 1425    Specimen: Blood Updated: 10/02/22 1500     proBNP 51.5 pg/mL     Narrative:      Among patients with dyspnea, NT-proBNP is highly sensitive for the detection of acute congestive heart failure. In addition NT-proBNP of <300 pg/ml effectively rules out acute congestive heart failure with 99% negative predictive value.    Results may be falsely decreased if patient taking Biotin.      Troponin [[id number]]  (Normal) Collected: 10/02/22 1425    Specimen: Blood Updated: 10/02/22 1500     Troponin T <0.010 ng/mL     Narrative:      Troponin T Reference Range:  <= 0.03 ng/mL-   Negative for AMI  >0.03 ng/mL-     Abnormal for myocardial necrosis.  Clinicians would have to utilize clinical acumen, EKG, Troponin and serial changes to determine if it is an Acute Myocardial Infarction or myocardial injury due to an underlying chronic condition.       Results may be falsely decreased if patient taking Biotin.      TSH [[id number]]  (Normal) Collected: 10/02/22 1425    Specimen: Blood Updated: 10/02/22 1500     TSH 1.560 uIU/mL     Ammonia [[id number]]  (Abnormal) Collected: 10/02/22 1425    Specimen: Blood Updated: 10/02/22 1456     Ammonia 12 umol/L     Comprehensive Metabolic Panel [[id number]] Collected: 10/02/22 1425    Specimen: Blood Updated: 10/02/22 1455     Glucose 93 mg/dL      BUN 14 mg/dL      Creatinine 1.00 mg/dL      Sodium 141 mmol/L      Potassium 4.4 mmol/L      Chloride 105 mmol/L      CO2 27.0 mmol/L      Calcium 9.4 mg/dL      Total Protein 7.1 g/dL      Albumin 4.00 g/dL      ALT (SGPT) 11 U/L      AST (SGOT) 10 U/L      Alkaline Phosphatase 90 U/L       Total Bilirubin 0.3 mg/dL      Globulin 3.1 gm/dL      A/G Ratio 1.3 g/dL      BUN/Creatinine Ratio 14.0     Anion Gap 9.0 mmol/L      eGFR 85.6 mL/min/1.73      Comment: National Kidney Foundation and American Society of Nephrology (ASN) Task Force recommended calculation based on the Chronic Kidney Disease Epidemiology Collaboration (CKD-EPI) equation refit without adjustment for race.       Narrative:      GFR Normal >60  Chronic Kidney Disease <60  Kidney Failure <15      Lipase [[id number]]  (Normal) Collected: 10/02/22 1425    Specimen: Blood Updated: 10/02/22 1455     Lipase 24 U/L     CK [[id number]]  (Normal) Collected: 10/02/22 1425    Specimen: Blood Updated: 10/02/22 1455     Creatine Kinase 51 U/L     Acetaminophen Level [[id number]]  (Normal) Collected: 10/02/22 1425    Specimen: Blood Updated: 10/02/22 1455     Acetaminophen <5.0 mcg/mL     Ethanol [[id number]] Collected: 10/02/22 1425    Specimen: Blood Updated: 10/02/22 1455     Ethanol <10 mg/dL      Ethanol % <0.010 %     Salicylate Level [[id number]]  (Normal) Collected: 10/02/22 1425    Specimen: Blood Updated: 10/02/22 1455     Salicylate <0.3 mg/dL     Urinalysis With Microscopic If Indicated (No Culture) - Urine, Clean Catch [[id number]]  (Normal) Collected: 10/02/22 1441    Specimen: Urine, Clean Catch Updated: 10/02/22 1453     Color, UA Yellow     Appearance, UA Clear     pH, UA 8.0     Specific Gravity, UA 1.014     Glucose, UA Negative     Ketones, UA Negative     Bilirubin, UA Negative     Blood, UA Negative     Protein, UA Negative     Leuk Esterase, UA Negative     Nitrite, UA Negative     Urobilinogen, UA 0.2 E.U./dL    Narrative:      Urine microscopic not indicated.    COVID-19 and FLU A/B PCR - Swab, Nasopharynx [[id number]]  (Normal) Collected: 10/02/22 1426    Specimen: Swab from Nasopharynx Updated: 10/02/22 1453     COVID19 Not Detected     Influenza A PCR Not Detected     Influenza B PCR Not Detected    Narrative:      Fact  sheet for providers: https://www.fda.gov/media/145991/download    Fact sheet for patients: https://www.fda.gov/media/577065/download    Test performed by PCR.    CBC & Differential [[id number]]  (Abnormal) Collected: 10/02/22 1425    Specimen: Blood Updated: 10/02/22 1434    Narrative:      The following orders were created for panel order CBC & Differential.  Procedure                               Abnormality         Status                     ---------                               -----------         ------                     CBC Auto Differential[[id number]]        Abnormal            Final result                 Please view results for these tests on the individual orders.    CBC Auto Differential [[id number]]  (Abnormal) Collected: 10/02/22 1425    Specimen: Blood Updated: 10/02/22 1434     WBC 9.14 10*3/mm3      RBC 4.83 10*6/mm3      Hemoglobin 14.6 g/dL      Hematocrit 45.4 %      MCV 94.0 fL      MCH 30.2 pg      MCHC 32.2 g/dL      RDW 12.8 %      RDW-SD 43.9 fl      MPV 9.3 fL      Platelets 247 10*3/mm3      Neutrophil % 71.5 %      Lymphocyte % 17.5 %      Monocyte % 9.1 %      Eosinophil % 1.1 %      Basophil % 0.5 %      Immature Grans % 0.3 %      Neutrophils, Absolute 6.53 10*3/mm3      Lymphocytes, Absolute 1.60 10*3/mm3      Monocytes, Absolute 0.83 10*3/mm3      Eosinophils, Absolute 0.10 10*3/mm3      Basophils, Absolute 0.05 10*3/mm3      Immature Grans, Absolute 0.03 10*3/mm3      nRBC 0.0 /100 WBC     Brewer Top [[id number]] Collected: 10/02/22 1425    Specimen: Blood Updated: 10/02/22 1432           Imaging Results (Last 24 Hours)     Procedure Component Value Units Date/Time    CT Head Without Contrast [[id number]] Collected: 10/02/22 1444     Updated: 10/02/22 1501    Narrative:        CT Head Without Contrast    History: Confusion    Axial scans of the brain were obtained without intravenous  contrast.  Coronal and sagital reconstructions were preformed.    This exam was performed according  to our departmental  dose-optimization program, which includes automated exposure  control, adjustment of the mA and/or kV according to patient size  and/or use of iterative reconstruction technique.    DLP: 813.90    Comparison: None    Findings:  Bone windows are unremarkable.  Mucosal thickening ethmoid sinuses.    No acute process.  Cerebral and cerebellar atrophy.  Mild small vessel disease.  No hemorrhage.  No mass.  No abnormal areas of increased attenuation.  No midline shift.  No abnormal extra-axial fluid collections.      Impression:      CONCLUSION:  No acute process.  Cerebral and cerebellar atrophy.  Mild small vessel disease.    [id number]    Electronically signed by:  Kumar Crawford MD  10/2/2022 2:58 PM CDT  Workstation: [id number]    XR Chest 1 View [[id number]] Collected: 10/02/22 1417     Updated: 10/02/22 1443    Narrative:        PORTABLE CHEST    HISTORY: Confusion    Portable AP upright film of the chest was obtained at 1:45 PM.  COMPARISON: September 29, 2020    FINDINGS:   EKG leads.  Discoid atelectasis left lung base.  The heart is not enlarged.  The pulmonary vasculature is not increased.  No pleural effusion.  No pneumothorax.  No acute osseous abnormality.      Impression:      CONCLUSION:  Discoid atelectasis left lung base.    [id number]    Electronically signed by:  Kumar Crawford MD  10/2/2022 2:41 PM CDT  Workstation: [id number]        I reviewed the patient's new clinical results.    ASSESSMENT AND PLAN: This is a 61 y.o. male with:      Amnesia memory loss    1. Retrograde amnesia  -CT head showed no acute processes  - CBC and CMP unremarkable  -UDS negative, UA unremarkable  - Ammonia, TSH and CXR all within normal  - Ethanol level and acetaminophen negative.   - Psychiatry consulted will see once patient is on the murray [Dr. Flip Beard]  - Regular diet  - One-on-one sitter  - MRI brain ordered, EEG ordered.   - Pending folate, vitamin B 12, TSH, and HIV.     2. GERD  - Continued home  protonix    3. HTN  - Started on Amlodipine 5 mg  - PRN hydralazine 20 mg if SBP above 160    DVT prophylaxis:   Mechanical Order History:      Ordered        Signed and Held  Place Sequential Compression Device  Once            Signed and Held  Maintain Sequential Compression Device  Continuous                    Pharmalogical Order History:     None         Code status is   Code Status and Medical Interventions:   Ordered at: 10/02/22 1611     Level Of Support Discussed With:    Patient     Code Status (Patient has no pulse and is not breathing):    CPR (Attempt to Resuscitate)     Medical Interventions (Patient has pulse or is breathing):    Full Support     Release to patient:    Routine Release        Germain Masterson and I have discussed pain goals for this hospitalization after reviewing his current clinical condition, medical history and prior pain experiences.  The goal is to keep his  pain level controlled.  To help achieve this, I plan to give him PRN pain medication if he develops pain.    I discussed the patient's findings and my recommendations with patient.     Enio Lamar MD is the attending on record at time of admission, he is aware of the patient's status and agrees with the above history and physical.          This document has been electronically signed by Valeriy Trimble MD on October 2, 2022 16:14 CDT     Electronically signed by Enio Motta MD at 10/03/22 0927          Physician Progress Notes (last 48 hours)      Ирина Stock MD at 11/07/22 0651     Attestation signed by Geoff South MD at 11/07/22 1513    I have seen and evaluated the patient.  I have discussed the case with the resident. I have reviewed the notes, assessment and plan, and/or procedures performed by the resident. I concur with the resident’s documentation.       No overnight events. Patient walking around his room. No acute complaints.     Physical Exam:  General: NAD.  CV: S1 and S2 normal.  RRR.  Pulmonary: Lungs clear to auscultation bilaterally.  Abdomen: Bowel sounds present and normal.  Abdomen is soft, and nontender.  Extremities: No lower extremity edema.     Plan: Placement to personal care home in progress. Continue with current medical management.       This document has been electronically signed by Geoff South MD on 2022 15:13 [facility]                        FAMILY MEDICINE RESIDENCY SERVICE  DAILY PROGRESS NOTE    NAME: Germain Masterson  : 1960  MRN: [id number]      LOS: 33 days     PROVIDER OF SERVICE: Ирина Stock MD    Chief Complaint: Discharge planning issues    Subjective:     Interval History:  History taken from: patient  Patient has no complaints overnight.  We discussed with the patient today that he will be having interview with the personal-care home.     Review of Systems:   Review of Systems   Constitutional: Negative.    HENT: Negative for congestion, dental problem, ear discharge, facial swelling, rhinorrhea and sore throat.    Eyes: Negative for pain and visual disturbance.   Respiratory: Negative for apnea, cough, chest tightness, shortness of breath and wheezing.    Cardiovascular: Negative for chest pain, palpitations and leg swelling.   Gastrointestinal: Negative for abdominal pain, blood in stool, constipation, diarrhea, nausea and vomiting.   Endocrine: Negative for cold intolerance, heat intolerance, polydipsia and polyuria.   Genitourinary: Negative for dysuria, flank pain and hematuria.   Musculoskeletal: Negative for back pain and neck pain.   Skin: Negative for rash.   Neurological: Negative for dizziness, syncope, weakness and headaches.       Objective:     Vital Signs  Temp:  [97.1 °F (36.2 °C)-98.2 °F (36.8 °C)] 97.1 °F (36.2 °C)  Heart Rate:  [68-85] 73  Resp:  [16-19] 18  BP: (121-134)/(71-87) 126/74   Body mass index is 28.31 kg/m².    Physical Exam  Physical Exam  Vitals reviewed.   HENT:      Head: Normocephalic and  atraumatic.      Mouth/Throat:      Pharynx: Oropharynx is clear.   Eyes:      Pupils: Pupils are equal, round, and reactive to light.   Cardiovascular:      Rate and Rhythm: Normal rate and regular rhythm.      Pulses: Normal pulses.      Heart sounds: Normal heart sounds.   Pulmonary:      Effort: Pulmonary effort is normal.      Breath sounds: Normal breath sounds.   Abdominal:      General: Abdomen is flat. Bowel sounds are normal.      Palpations: Abdomen is soft.   Musculoskeletal:         General: Normal range of motion.      Cervical back: Normal range of motion and neck supple.   Skin:     General: Skin is warm and dry.      Capillary Refill: Capillary refill takes less than 2 seconds.   Neurological:      General: No focal deficit present.      Mental Status: He is alert and oriented to person, place, and time. Mental status is at baseline.   Psychiatric:         Mood and Affect: Mood normal.         Behavior: Behavior normal.         Scheduled Meds   amLODIPine, 10 mg, Oral, Q24H  escitalopram, 10 mg, Oral, Daily  Hydrocortisone (Perianal), , Rectal, BID  multivitamin, 1 tablet, Oral, Daily  pantoprazole, 40 mg, Oral, Q AM  rivastigmine, 1 patch, Transdermal, Daily  rosuvastatin, 20 mg, Oral, Nightly  senna-docusate sodium, 2 tablet, Oral, BID       PRN Meds   •  acetaminophen  •  calcium carbonate  •  melatonin  •  sodium chloride      Diagnostic Data    Results from last 7 days   Lab Units 11/05/22  0653   WBC 10*3/mm3 8.44   HEMOGLOBIN g/dL 12.7*   HEMATOCRIT % 38.8   PLATELETS 10*3/mm3 216   GLUCOSE mg/dL 98   CREATININE mg/dL 0.95   BUN mg/dL 19   SODIUM mmol/L 140   POTASSIUM mmol/L 4.3   AST (SGOT) U/L 13   ALT (SGPT) U/L 12   ALK PHOS U/L 68   BILIRUBIN mg/dL 0.3   ANION GAP mmol/L 6.0       No radiology results for the last day      I reviewed the patient's new clinical results.    Assessment/Plan:     Active Hospital Problems    Diagnosis  POA   • **Discharge planning issues [Z02.9]  Not  Applicable   • Poor dentition [K08.9]  Yes   • Depression [F32.A]  Yes   • Neurocognitive deficits [R29.818, R41.89]  Yes   • Amnesia memory loss [R41.3]  Yes     61-year-old male initially admitted to hospital for trauma induced retrograde amnesia.  Brain imaging negative.  Retrograde amnesia at this point has resolved however we are having continuing problems getting post discharge placement issues resolved.  From a medical standpoint patient appears to be fully recovered.     #1 Retrograde amnesia  - Retrograde amnesia appears to have been resolved at this point.  - Psychiatry is evaluated patient and deemed him to be competent at this time.  - Repeat lab work done today at 5-day interval within normal limits.     #2 Placement issue/POA  The patient is potentially going to [facility].  [facility] will interview this patient today and decide if they will accept this patient.                                                  DVT prophylaxis: SCDs/TEDs  Code status is   Code Status and Medical Interventions:   Ordered at: 10/02/22 1611     Level Of Support Discussed With:    Patient     Code Status (Patient has no pulse and is not breathing):    CPR (Attempt to Resuscitate)     Medical Interventions (Patient has pulse or is breathing):    Full Support     Release to patient:    Routine Release       Plan for disposition:Where: personal care home      Time: 15 min         This document has been electronically signed by Ирина Stock MD on November 7, 2022 06:51 CST    Electronically signed by Geoff South MD at 11/07/22 8540

## 2025-05-14 NOTE — PROGRESS NOTES
FAMILY MEDICINE RESIDENCY SERVICE  DAILY PROGRESS NOTE    NAME: Germain Masterson  : 1960  MRN: [id number]      LOS: 30 days     PROVIDER OF SERVICE: Ирина Stock MD    Chief Complaint: Discharge planning issues    Subjective:     Interval History:  History taken from: patient  Patient is oriented to name and place but not year today.  Patient states that the year is .  After talking to the nursing staff they have noticed as patient's mentation has significantly improved over the past month they also noticed that the patient has been making phone calls to many people but no one has come to visit him at this time.   Patient denies abdominal pain, chest pain, nausea, vomiting, changes in urinary and bowel habitsPatient has no acute complaints today.     Review of Systems:   Review of Systems   Constitutional: Negative.    HENT: Negative for congestion, dental problem, ear discharge, facial swelling, rhinorrhea and sore throat.    Eyes: Negative for pain and visual disturbance.   Respiratory: Negative for apnea, cough, chest tightness, shortness of breath and wheezing.    Cardiovascular: Negative for chest pain, palpitations and leg swelling.   Gastrointestinal: Negative for abdominal pain, blood in stool, constipation, diarrhea, nausea and vomiting.   Endocrine: Negative for cold intolerance, heat intolerance, polydipsia and polyuria.   Genitourinary: Negative for dysuria, flank pain and hematuria.   Musculoskeletal: Negative for back pain and neck pain.   Skin: Negative for rash.   Neurological: Negative for dizziness, syncope, weakness and headaches.   Psychiatric/Behavioral: Positive for confusion.       Objective:     Vital Signs  Temp:  [96.8 °F (36 °C)-97.5 °F (36.4 °C)] 97.5 °F (36.4 °C)  Heart Rate:  [68-89] 72  Resp:  [18] 18  BP: (106-133)/(63-88) 106/63   Body mass index is 28.07 kg/m².    Physical Exam  Physical Exam  Vitals reviewed.   Constitutional:       Appearance: He is obese.  Patient contacted clinic leaving voicemail at 1:39 pm for scheduling emg appointment from new referral. Writer called patient back for scheduling but received patients voicemail. Left message advising to call clinic back for scheduling.      HENT:      Head: Normocephalic and atraumatic.      Mouth/Throat:      Pharynx: Oropharynx is clear.   Eyes:      Pupils: Pupils are equal, round, and reactive to light.   Cardiovascular:      Rate and Rhythm: Normal rate and regular rhythm.      Pulses: Normal pulses.      Heart sounds: Normal heart sounds.   Pulmonary:      Effort: Pulmonary effort is normal.      Breath sounds: Normal breath sounds.   Abdominal:      General: Abdomen is flat. Bowel sounds are normal.      Palpations: Abdomen is soft.   Musculoskeletal:         General: Normal range of motion.      Cervical back: Normal range of motion and neck supple.   Skin:     General: Skin is warm and dry.      Capillary Refill: Capillary refill takes less than 2 seconds.   Neurological:      General: No focal deficit present.      Mental Status: He is alert and oriented to person, place, and time. Mental status is at baseline.   Psychiatric:         Mood and Affect: Mood normal.         Behavior: Behavior normal.         Scheduled Meds   amLODIPine, 10 mg, Oral, Q24H  escitalopram, 10 mg, Oral, Daily  Hydrocortisone (Perianal), , Rectal, BID  multivitamin, 1 tablet, Oral, Daily  pantoprazole, 40 mg, Oral, Q AM  rivastigmine, 1 patch, Transdermal, Daily  rosuvastatin, 20 mg, Oral, Nightly  senna-docusate sodium, 2 tablet, Oral, BID       PRN Meds   •  acetaminophen  •  calcium carbonate  •  melatonin  •  sodium chloride      Diagnostic Data    Results from last 7 days   Lab Units 10/29/22  0558   WBC 10*3/mm3 8.77   HEMOGLOBIN g/dL 12.7*   HEMATOCRIT % 38.9   PLATELETS 10*3/mm3 221   GLUCOSE mg/dL 92   CREATININE mg/dL 1.09   BUN mg/dL 21   SODIUM mmol/L 140   POTASSIUM mmol/L 4.1   AST (SGOT) U/L 15   ALT (SGPT) U/L 17   ALK PHOS U/L 66   BILIRUBIN mg/dL 0.2   ANION GAP mmol/L 8.0       No radiology results for the last day      I reviewed the patient's new clinical results.    Assessment/Plan:     Active Hospital Problems    Diagnosis  POA   • **Discharge  planning issues [Z02.9]  Not Applicable   • Poor dentition [K08.9]  Yes   • Depression [F32.A]  Yes   • Neurocognitive deficits [R29.818, R41.89]  Yes   • Amnesia memory loss [R41.3]  Yes     1. Retrograde amnesia  CT head negative, UDS negative, urinalysis negative, ammonia negative, ethanol negative, CMP within normal limits. B12 normal, thyroid normal.    MRI brain small periventricular white matter foci. RPR and HIV unremarkable.   -We reconsulted psychiatry today as patient's mentation has improved over the last month.  We will follow recommendations at this time.  -Placement management initiated by psychiatry; case management involved   -EEG nondiagnostic; neurology follow-up at discharge     2. Vascular dementia  - Exelon 4.6 mg patch daily     3.  Depressed and anxious mood  -Paxil discontinued. Lexapro 10 mg started as per psychiatry recommendation.     4. GERD  - Continued home protonix     5. HTN  - Amlodipine 10 mg  - PRN hydralazine 20 mg if SBP above 160     6. Placement issue/POA  -Case management working to place patient into facility  -Case management working on POA or guardianship for patient     7. Hemorrhoids  -Hydrocortisone (perianal) Anusol 2.5% rectal     8. LEFT ankle possible OA (Resolved)  -Capsaicin 0.1 % cream application BID  -PT/OT  -XRAY 10/12/2022 No fracture or dislocation. Very small calcaneal spurs.     9.  Intermittent bouts of constipation  -Bowel regiment in place     10.  Blood work/electrolytes stable for over 7 days:  CBC CMP all stopped 10/19/2022  Stopped due to risk of iatrogenic infection  Once weekly labs     11.  Reported abdominal pain 10/22/2022 (Resolved)  -CT abdomen showed right inguinal hernia repair and no recurrence of hernia.     12. Skin irritation LEFT medical gastronemius  - hydrocortisone cream      13.  Poor dentition  -Intermittent toothache resolved with Tylenol                    DVT prophylaxis: SCDs/TEDs  Code status is   Code Status and Medical  Interventions:   Ordered at: 10/02/22 1611     Level Of Support Discussed With:    Patient     Code Status (Patient has no pulse and is not breathing):    CPR (Attempt to Resuscitate)     Medical Interventions (Patient has pulse or is breathing):    Full Support     Release to patient:    Routine Release       Plan for disposition:Where: assisted living      Time: 15 min         This document has been electronically signed by Ирина Stock MD on November 1, 2022 08:56 CDT

## (undated) DEVICE — SOL IRR NACL 0.9PCT BT 1000ML

## (undated) DEVICE — GLV SURG SIGNATURE ESSENTIAL PF LTX SZ6.5

## (undated) DEVICE — SUT VIC 2/0 TIES 18IN J111T

## (undated) DEVICE — CLTH CLENS READYCLEANSE PERI CARE PK/5

## (undated) DEVICE — SUT VIC 2/0 SH 27IN

## (undated) DEVICE — SINGLE-USE BIOPSY FORCEPS: Brand: RADIAL JAW 4

## (undated) DEVICE — GLV SURG SIGNATURE ESSENTIAL PF LTX SZ7

## (undated) DEVICE — TBG PENCL TELESCP MEGADYNE SMOKE EVAC 10FT

## (undated) DEVICE — STERILE POLYISOPRENE POWDER-FREE SURGICAL GLOVES WITH EMOLLIENT COATING: Brand: PROTEXIS

## (undated) DEVICE — SUT NUROLON 2 0 CT1 18IN CR8 C522D

## (undated) DEVICE — GOWN,PREVENTION PLUS,XLNG/XXLARGE,STRL: Brand: MEDLINE

## (undated) DEVICE — PK MAJ PROC LF 60

## (undated) DEVICE — TRAP FLD MINIVAC MEGADYNE 100ML

## (undated) DEVICE — TOTAL TRAY, 16FR 10ML SIL FOLEY, URN: Brand: MEDLINE

## (undated) DEVICE — PATIENT RETURN ELECTRODE, SINGLE-USE, CONTACT QUALITY MONITORING, ADULT, WITH 9FT CORD, FOR PATIENTS WEIGING OVER 33LBS. (15KG): Brand: MEGADYNE

## (undated) DEVICE — DRN PENRS SIL 1/4X18IN LF STRL

## (undated) DEVICE — SUT MONOCRYL 4/0 PS2 27IN Y426H ETY426H

## (undated) DEVICE — BITEBLOCK ENDO W/STRAP 60F A/ LF DISP

## (undated) DEVICE — SUT VIC 2/0 CT1 CR8 18IN J839D